# Patient Record
Sex: MALE | Race: WHITE | NOT HISPANIC OR LATINO | Employment: OTHER | URBAN - METROPOLITAN AREA
[De-identification: names, ages, dates, MRNs, and addresses within clinical notes are randomized per-mention and may not be internally consistent; named-entity substitution may affect disease eponyms.]

---

## 2023-10-11 ENCOUNTER — HOSPITAL ENCOUNTER (INPATIENT)
Facility: HOSPITAL | Age: 73
LOS: 4 days | Discharge: HOME | DRG: 640 | End: 2023-10-15
Attending: EMERGENCY MEDICINE | Admitting: STUDENT IN AN ORGANIZED HEALTH CARE EDUCATION/TRAINING PROGRAM
Payer: MEDICARE

## 2023-10-11 ENCOUNTER — APPOINTMENT (OUTPATIENT)
Dept: RADIOLOGY | Facility: HOSPITAL | Age: 73
DRG: 640 | End: 2023-10-11
Payer: MEDICARE

## 2023-10-11 DIAGNOSIS — Z59.9 FINANCIAL DIFFICULTIES: ICD-10-CM

## 2023-10-11 DIAGNOSIS — Z91.89 AT INCREASED RISK FOR SOCIAL ISOLATION: ICD-10-CM

## 2023-10-11 DIAGNOSIS — N18.6 ESRD (END STAGE RENAL DISEASE) (MULTI): ICD-10-CM

## 2023-10-11 DIAGNOSIS — E63.9 UNDERNUTRITION: ICD-10-CM

## 2023-10-11 DIAGNOSIS — F33.1 MODERATE EPISODE OF RECURRENT MAJOR DEPRESSIVE DISORDER (MULTI): ICD-10-CM

## 2023-10-11 DIAGNOSIS — R79.89 ELEVATED TROPONIN: ICD-10-CM

## 2023-10-11 DIAGNOSIS — R53.1 WEAKNESS: Primary | ICD-10-CM

## 2023-10-11 PROBLEM — Z99.2 ESRD ON DIALYSIS (MULTI): Chronic | Status: ACTIVE | Noted: 2023-10-11

## 2023-10-11 PROBLEM — D53.9 MACROCYTIC ANEMIA: Status: ACTIVE | Noted: 2023-10-11

## 2023-10-11 LAB
ALBUMIN SERPL BCP-MCNC: 4.2 G/DL (ref 3.4–5)
ALP SERPL-CCNC: 100 U/L (ref 33–136)
ALT SERPL W P-5'-P-CCNC: 21 U/L (ref 10–52)
ANION GAP SERPL CALC-SCNC: 16 MMOL/L (ref 10–20)
APPEARANCE UR: CLEAR
AST SERPL W P-5'-P-CCNC: 22 U/L (ref 9–39)
BASOPHILS # BLD AUTO: 0.02 X10*3/UL (ref 0–0.1)
BASOPHILS NFR BLD AUTO: 0.2 %
BILIRUB SERPL-MCNC: 1 MG/DL (ref 0–1.2)
BILIRUB UR STRIP.AUTO-MCNC: NEGATIVE MG/DL
BUN SERPL-MCNC: 29 MG/DL (ref 6–23)
CALCIUM SERPL-MCNC: 10.4 MG/DL (ref 8.6–10.3)
CARDIAC TROPONIN I PNL SERPL HS: 120 NG/L (ref 0–20)
CARDIAC TROPONIN I PNL SERPL HS: 123 NG/L (ref 0–20)
CHLORIDE SERPL-SCNC: 95 MMOL/L (ref 98–107)
CO2 SERPL-SCNC: 33 MMOL/L (ref 21–32)
COLOR UR: YELLOW
CREAT SERPL-MCNC: 2.8 MG/DL (ref 0.5–1.3)
EOSINOPHIL # BLD AUTO: 0.14 X10*3/UL (ref 0–0.4)
EOSINOPHIL NFR BLD AUTO: 1.6 %
ERYTHROCYTE [DISTWIDTH] IN BLOOD BY AUTOMATED COUNT: 14 % (ref 11.5–14.5)
FLUAV RNA RESP QL NAA+PROBE: NOT DETECTED
FLUBV RNA RESP QL NAA+PROBE: NOT DETECTED
GFR SERPL CREATININE-BSD FRML MDRD: 23 ML/MIN/1.73M*2
GLUCOSE SERPL-MCNC: 102 MG/DL (ref 74–99)
GLUCOSE UR STRIP.AUTO-MCNC: ABNORMAL MG/DL
HCT VFR BLD AUTO: 33.7 % (ref 41–52)
HGB BLD-MCNC: 11 G/DL (ref 13.5–17.5)
HOLD SPECIMEN: NORMAL
IMM GRANULOCYTES # BLD AUTO: 0.02 X10*3/UL (ref 0–0.5)
IMM GRANULOCYTES NFR BLD AUTO: 0.2 % (ref 0–0.9)
KETONES UR STRIP.AUTO-MCNC: NEGATIVE MG/DL
LEUKOCYTE ESTERASE UR QL STRIP.AUTO: NEGATIVE
LYMPHOCYTES # BLD AUTO: 3.95 X10*3/UL (ref 0.8–3)
LYMPHOCYTES NFR BLD AUTO: 46 %
MAGNESIUM SERPL-MCNC: 2.17 MG/DL (ref 1.6–2.4)
MCH RBC QN AUTO: 33.3 PG (ref 26–34)
MCHC RBC AUTO-ENTMCNC: 32.6 G/DL (ref 32–36)
MCV RBC AUTO: 102 FL (ref 80–100)
MONOCYTES # BLD AUTO: 0.81 X10*3/UL (ref 0.05–0.8)
MONOCYTES NFR BLD AUTO: 9.4 %
NEUTROPHILS # BLD AUTO: 3.65 X10*3/UL (ref 1.6–5.5)
NEUTROPHILS NFR BLD AUTO: 42.6 %
NITRITE UR QL STRIP.AUTO: NEGATIVE
NRBC BLD-RTO: 0 /100 WBCS (ref 0–0)
PH UR STRIP.AUTO: 9 [PH]
PLATELET # BLD AUTO: 113 X10*3/UL (ref 150–450)
PMV BLD AUTO: 10.5 FL (ref 7.5–11.5)
POTASSIUM SERPL-SCNC: 3.3 MMOL/L (ref 3.5–5.3)
PROT SERPL-MCNC: 6.6 G/DL (ref 6.4–8.2)
PROT UR STRIP.AUTO-MCNC: ABNORMAL MG/DL
RBC # BLD AUTO: 3.3 X10*6/UL (ref 4.5–5.9)
RBC # UR STRIP.AUTO: NEGATIVE /UL
RBC #/AREA URNS AUTO: NORMAL /HPF
SARS-COV-2 RNA RESP QL NAA+PROBE: NOT DETECTED
SODIUM SERPL-SCNC: 141 MMOL/L (ref 136–145)
SP GR UR STRIP.AUTO: 1
UROBILINOGEN UR STRIP.AUTO-MCNC: <2 MG/DL
WBC # BLD AUTO: 8.6 X10*3/UL (ref 4.4–11.3)
WBC #/AREA URNS AUTO: NORMAL /HPF

## 2023-10-11 PROCEDURE — 2500000001 HC RX 250 WO HCPCS SELF ADMINISTERED DRUGS (ALT 637 FOR MEDICARE OP): Performed by: EMERGENCY MEDICINE

## 2023-10-11 PROCEDURE — 71046 X-RAY EXAM CHEST 2 VIEWS: CPT | Performed by: RADIOLOGY

## 2023-10-11 PROCEDURE — 82746 ASSAY OF FOLIC ACID SERUM: CPT | Mod: CMCLAB

## 2023-10-11 PROCEDURE — 1100000001 HC PRIVATE ROOM DAILY

## 2023-10-11 PROCEDURE — 99285 EMERGENCY DEPT VISIT HI MDM: CPT | Performed by: EMERGENCY MEDICINE

## 2023-10-11 PROCEDURE — 2500000004 HC RX 250 GENERAL PHARMACY W/ HCPCS (ALT 636 FOR OP/ED)

## 2023-10-11 PROCEDURE — 71046 X-RAY EXAM CHEST 2 VIEWS: CPT

## 2023-10-11 PROCEDURE — 81001 URINALYSIS AUTO W/SCOPE: CPT

## 2023-10-11 PROCEDURE — 87636 SARSCOV2 & INF A&B AMP PRB: CPT

## 2023-10-11 PROCEDURE — 84484 ASSAY OF TROPONIN QUANT: CPT

## 2023-10-11 PROCEDURE — 2500000001 HC RX 250 WO HCPCS SELF ADMINISTERED DRUGS (ALT 637 FOR MEDICARE OP)

## 2023-10-11 PROCEDURE — 85025 COMPLETE CBC W/AUTO DIFF WBC: CPT

## 2023-10-11 PROCEDURE — 36415 COLL VENOUS BLD VENIPUNCTURE: CPT

## 2023-10-11 PROCEDURE — 84443 ASSAY THYROID STIM HORMONE: CPT

## 2023-10-11 PROCEDURE — 87340 HEPATITIS B SURFACE AG IA: CPT | Mod: CMCLAB | Performed by: INTERNAL MEDICINE

## 2023-10-11 PROCEDURE — 99285 EMERGENCY DEPT VISIT HI MDM: CPT | Mod: 25 | Performed by: EMERGENCY MEDICINE

## 2023-10-11 PROCEDURE — 99223 1ST HOSP IP/OBS HIGH 75: CPT

## 2023-10-11 PROCEDURE — 83735 ASSAY OF MAGNESIUM: CPT

## 2023-10-11 PROCEDURE — 80053 COMPREHEN METABOLIC PANEL: CPT

## 2023-10-11 RX ORDER — VENLAFAXINE 37.5 MG/1
37.5 TABLET ORAL DAILY
COMMUNITY
End: 2023-10-15 | Stop reason: HOSPADM

## 2023-10-11 RX ORDER — DIVALPROEX SODIUM 250 MG/1
250 TABLET, FILM COATED, EXTENDED RELEASE ORAL NIGHTLY
COMMUNITY

## 2023-10-11 RX ORDER — POTASSIUM CHLORIDE 1.5 G/1.58G
20 POWDER, FOR SOLUTION ORAL ONCE
Status: COMPLETED | OUTPATIENT
Start: 2023-10-11 | End: 2023-10-11

## 2023-10-11 RX ORDER — MIDODRINE HYDROCHLORIDE 10 MG/1
10 TABLET ORAL DAILY PRN
COMMUNITY

## 2023-10-11 RX ORDER — CALCIUM ACETATE 667 MG/1
667 CAPSULE ORAL
COMMUNITY

## 2023-10-11 RX ORDER — BUPROPION HYDROCHLORIDE 100 MG/1
200 TABLET, EXTENDED RELEASE ORAL 2 TIMES DAILY
Status: DISCONTINUED | OUTPATIENT
Start: 2023-10-11 | End: 2023-10-12

## 2023-10-11 RX ORDER — VENLAFAXINE HYDROCHLORIDE 75 MG/1
75 CAPSULE, EXTENDED RELEASE ORAL DAILY
COMMUNITY
End: 2023-10-15 | Stop reason: HOSPADM

## 2023-10-11 RX ORDER — SENNOSIDES 8.6 MG/1
2 TABLET ORAL 2 TIMES DAILY
Status: DISCONTINUED | OUTPATIENT
Start: 2023-10-11 | End: 2023-10-15 | Stop reason: HOSPADM

## 2023-10-11 RX ORDER — VENLAFAXINE HYDROCHLORIDE 75 MG/1
75 CAPSULE, EXTENDED RELEASE ORAL
Status: DISCONTINUED | OUTPATIENT
Start: 2023-10-12 | End: 2023-10-12

## 2023-10-11 RX ORDER — POTASSIUM CHLORIDE 20 MEQ/1
20 TABLET, EXTENDED RELEASE ORAL ONCE
Status: COMPLETED | OUTPATIENT
Start: 2023-10-11 | End: 2023-10-11

## 2023-10-11 RX ORDER — VENLAFAXINE HYDROCHLORIDE 37.5 MG/1
37.5 CAPSULE, EXTENDED RELEASE ORAL
Status: DISCONTINUED | OUTPATIENT
Start: 2023-10-12 | End: 2023-10-12

## 2023-10-11 RX ORDER — POLYETHYLENE GLYCOL 3350 17 G/17G
17 POWDER, FOR SOLUTION ORAL DAILY
Status: DISCONTINUED | OUTPATIENT
Start: 2023-10-12 | End: 2023-10-15 | Stop reason: HOSPADM

## 2023-10-11 RX ORDER — BUPROPION HYDROCHLORIDE 200 MG/1
200 TABLET, EXTENDED RELEASE ORAL DAILY
COMMUNITY

## 2023-10-11 RX ORDER — DIVALPROEX SODIUM 250 MG/1
250 TABLET, FILM COATED, EXTENDED RELEASE ORAL DAILY
Status: DISCONTINUED | OUTPATIENT
Start: 2023-10-11 | End: 2023-10-15 | Stop reason: HOSPADM

## 2023-10-11 RX ADMIN — DIVALPROEX SODIUM 250 MG: 250 TABLET, EXTENDED RELEASE ORAL at 22:22

## 2023-10-11 RX ADMIN — POTASSIUM CHLORIDE 20 MEQ: 1500 TABLET, EXTENDED RELEASE ORAL at 23:49

## 2023-10-11 RX ADMIN — BUPROPION HYDROCHLORIDE 200 MG: 100 TABLET, FILM COATED, EXTENDED RELEASE ORAL at 22:22

## 2023-10-11 RX ADMIN — SENNOSIDES 17.2 MG: 8.6 TABLET, FILM COATED ORAL at 23:49

## 2023-10-11 RX ADMIN — POTASSIUM CHLORIDE 20 MEQ: 1.5 POWDER, FOR SOLUTION ORAL at 23:49

## 2023-10-11 SDOH — SOCIAL STABILITY: SOCIAL INSECURITY: DO YOU FEEL ANYONE HAS EXPLOITED OR TAKEN ADVANTAGE OF YOU FINANCIALLY OR OF YOUR PERSONAL PROPERTY?: NO

## 2023-10-11 SDOH — SOCIAL STABILITY: SOCIAL INSECURITY: HAS ANYONE EVER THREATENED TO HURT YOUR FAMILY OR YOUR PETS?: NO

## 2023-10-11 SDOH — HEALTH STABILITY: MENTAL HEALTH: SUICIDE ASSESSMENT: ADULT (C-SSRS)

## 2023-10-11 SDOH — HEALTH STABILITY: MENTAL HEALTH

## 2023-10-11 SDOH — HEALTH STABILITY: MENTAL HEALTH: HAVE YOU EVER DONE ANYTHING, STARTED TO DO ANYTHING, OR PREPARED TO DO ANYTHING TO END YOUR LIFE?: NO

## 2023-10-11 SDOH — SOCIAL STABILITY: SOCIAL INSECURITY: WERE YOU ABLE TO COMPLETE ALL THE BEHAVIORAL HEALTH SCREENINGS?: YES

## 2023-10-11 SDOH — SOCIAL STABILITY: SOCIAL INSECURITY: HAVE YOU HAD THOUGHTS OF HARMING ANYONE ELSE?: NO

## 2023-10-11 SDOH — HEALTH STABILITY: MENTAL HEALTH: HAVE YOU WISHED YOU WERE DEAD OR WISHED YOU COULD GO TO SLEEP AND NOT WAKE UP?: NO

## 2023-10-11 SDOH — SOCIAL STABILITY: SOCIAL INSECURITY: ARE THERE ANY APPARENT SIGNS OF INJURIES/BEHAVIORS THAT COULD BE RELATED TO ABUSE/NEGLECT?: NO

## 2023-10-11 SDOH — SOCIAL STABILITY: SOCIAL INSECURITY: DO YOU FEEL UNSAFE GOING BACK TO THE PLACE WHERE YOU ARE LIVING?: NO

## 2023-10-11 SDOH — HEALTH STABILITY: MENTAL HEALTH: CONTENT: UNREMARKABLE

## 2023-10-11 SDOH — HEALTH STABILITY: MENTAL HEALTH: HAVE YOU ACTUALLY HAD ANY THOUGHTS OF KILLING YOURSELF?: NO

## 2023-10-11 SDOH — ECONOMIC STABILITY - INCOME SECURITY: PROBLEM RELATED TO HOUSING AND ECONOMIC CIRCUMSTANCES, UNSPECIFIED: Z59.9

## 2023-10-11 SDOH — HEALTH STABILITY: MENTAL HEALTH: BEHAVIORS/MOOD: TEARFUL

## 2023-10-11 SDOH — SOCIAL STABILITY: SOCIAL INSECURITY: ARE YOU OR HAVE YOU BEEN THREATENED OR ABUSED PHYSICALLY, EMOTIONALLY, OR SEXUALLY BY ANYONE?: NO

## 2023-10-11 SDOH — SOCIAL STABILITY: SOCIAL INSECURITY: ABUSE: ADULT

## 2023-10-11 SDOH — HEALTH STABILITY: MENTAL HEALTH: SLEEP PATTERN: DIFFICULTY FALLING ASLEEP

## 2023-10-11 SDOH — SOCIAL STABILITY: SOCIAL INSECURITY: DOES ANYONE TRY TO KEEP YOU FROM HAVING/CONTACTING OTHER FRIENDS OR DOING THINGS OUTSIDE YOUR HOME?: NO

## 2023-10-11 SDOH — SOCIAL STABILITY: SOCIAL NETWORK: PARENT/GUARDIAN/SIGNIFICANT OTHER INVOLVEMENT: NO INVOLVEMENT

## 2023-10-11 SDOH — SOCIAL STABILITY: SOCIAL NETWORK: EMOTIONAL SUPPORT GIVEN: REASSURE

## 2023-10-11 ASSESSMENT — COGNITIVE AND FUNCTIONAL STATUS - GENERAL
STANDING UP FROM CHAIR USING ARMS: A LITTLE
MOBILITY SCORE: 19
PERSONAL GROOMING: A LITTLE
WALKING IN HOSPITAL ROOM: A LITTLE
DRESSING REGULAR UPPER BODY CLOTHING: A LITTLE
DAILY ACTIVITIY SCORE: 19
PATIENT BASELINE BEDBOUND: NO
DRESSING REGULAR LOWER BODY CLOTHING: A LITTLE
HELP NEEDED FOR BATHING: A LITTLE
CLIMB 3 TO 5 STEPS WITH RAILING: A LITTLE
MOVING TO AND FROM BED TO CHAIR: A LITTLE
TOILETING: A LITTLE
TURNING FROM BACK TO SIDE WHILE IN FLAT BAD: A LITTLE

## 2023-10-11 ASSESSMENT — ACTIVITIES OF DAILY LIVING (ADL)
TOILETING: NEEDS ASSISTANCE
GROOMING: INDEPENDENT
PATIENT'S MEMORY ADEQUATE TO SAFELY COMPLETE DAILY ACTIVITIES?: YES
FEEDING YOURSELF: INDEPENDENT
HEARING - LEFT EAR: FUNCTIONAL
DRESSING YOURSELF: INDEPENDENT
JUDGMENT_ADEQUATE_SAFELY_COMPLETE_DAILY_ACTIVITIES: YES
HEARING - RIGHT EAR: FUNCTIONAL
BATHING: INDEPENDENT
LACK_OF_TRANSPORTATION: NO
ADEQUATE_TO_COMPLETE_ADL: YES
WALKS IN HOME: NEEDS ASSISTANCE

## 2023-10-11 ASSESSMENT — PATIENT HEALTH QUESTIONNAIRE - PHQ9
1. LITTLE INTEREST OR PLEASURE IN DOING THINGS: SEVERAL DAYS
SUM OF ALL RESPONSES TO PHQ9 QUESTIONS 1 & 2: 2
2. FEELING DOWN, DEPRESSED OR HOPELESS: SEVERAL DAYS

## 2023-10-11 ASSESSMENT — LIFESTYLE VARIABLES
EVER HAD A DRINK FIRST THING IN THE MORNING TO STEADY YOUR NERVES TO GET RID OF A HANGOVER: NO
HOW MANY STANDARD DRINKS CONTAINING ALCOHOL DO YOU HAVE ON A TYPICAL DAY: PATIENT DOES NOT DRINK
HOW OFTEN DO YOU HAVE A DRINK CONTAINING ALCOHOL: NEVER
HAVE YOU EVER FELT YOU SHOULD CUT DOWN ON YOUR DRINKING: NO
AUDIT-C TOTAL SCORE: 0
EVER FELT BAD OR GUILTY ABOUT YOUR DRINKING: NO
AUDIT-C TOTAL SCORE: 0
HAVE PEOPLE ANNOYED YOU BY CRITICIZING YOUR DRINKING: NO
SKIP TO QUESTIONS 9-10: 1
HOW OFTEN DO YOU HAVE 6 OR MORE DRINKS ON ONE OCCASION: NEVER

## 2023-10-11 ASSESSMENT — COLUMBIA-SUICIDE SEVERITY RATING SCALE - C-SSRS
6. HAVE YOU EVER DONE ANYTHING, STARTED TO DO ANYTHING, OR PREPARED TO DO ANYTHING TO END YOUR LIFE?: NO
1. IN THE PAST MONTH, HAVE YOU WISHED YOU WERE DEAD OR WISHED YOU COULD GO TO SLEEP AND NOT WAKE UP?: NO
2. HAVE YOU ACTUALLY HAD ANY THOUGHTS OF KILLING YOURSELF?: NO

## 2023-10-11 ASSESSMENT — PAIN SCALES - GENERAL
PAINLEVEL_OUTOF10: 0 - NO PAIN
PAINLEVEL_OUTOF10: 0 - NO PAIN

## 2023-10-11 ASSESSMENT — PAIN - FUNCTIONAL ASSESSMENT
PAIN_FUNCTIONAL_ASSESSMENT: 0-10
PAIN_FUNCTIONAL_ASSESSMENT: 0-10

## 2023-10-11 NOTE — ED TRIAGE NOTES
Pt to ED after his dialysis session today feeling down and depressed. Pt states he is unable to care for himself at home because he is weak. States he was in a facility for 5 months but did not care for it. Pt tearful during discussion, denies SI/HI/AH/VH.

## 2023-10-11 NOTE — ED PROVIDER NOTES
HPI   Chief Complaint   Patient presents with    Depression       HPI     73 year old male with past history of bipolar, constipation, esrd on hd  Arriving at [facility] er for weakness, undernutrition and poor mood sent by his nephrologist for possible placement for acute rehab. He was discharged from [facility] recently and prefers not to return. He states that he has been eating less than usual and cooking less although he made a turkey sandwich yesterday night but did not eat anything except a protein bar today. He denies recent weight loss. He enjoys his activities such as driving through a park but has not gone for several weeks due to fatigue. He has minimal family support has brothers in [place] and a sister in [place] but no siblings in [place], he has no children, lives by himself, He sleeps well. He has financial constraints and therefore does not use [employer] or other food delivery services. He has a psychiatrist and states that he has not seen him recently.              No data recorded                Patient History   Past Medical History:   Diagnosis Date    Bipolar disorder, unspecified (CMS/[facility])     Bipolar disorder (manic depression)    End stage renal disease (CMS/[facility]) 05/27/2015    ESRD (end stage renal disease) on dialysis     Past Surgical History:   Procedure Laterality Date    OTHER SURGICAL HISTORY  05/27/2015    Creation Of AV Fistula - Nonautogenous Graft    TONSILLECTOMY  05/27/2015    Tonsillectomy With Adenoidectomy     No family history on file.  Social History     Tobacco Use    Smoking status: Not on file    Smokeless tobacco: Not on file   Substance Use Topics    Alcohol use: Not on file    Drug use: Not on file       Physical Exam   ED Triage Vitals [10/11/23 1606]   Temp Heart Rate Resp BP   36.6 °C (97.9 °F) 87 18 136/90      SpO2 Temp src Heart Rate Source Patient Position   98 % -- -- Lying      BP Location FiO2 (%)     Left arm --       Physical Exam  General: Alert,  frail, mild acute distress, well developed, dehydrated  Head: NCAT  Eyes: Clear conjunctiva, EOMI  ENT: Moist mucosa, no lymphadenopathy  Respiratory: Normal respiratory effort. CTAB. Symmetric aeration. No wheezes, rales, or Rhonchi.  CV: Normal rhythm, Normal Rate, pulses present bilaterally  GI: Soft, NT, no guarding, BS normoactive, No distention, No hernia, No palpable mass.  : no flank tenderness, no cva tenderness  MSK: Atraumatic. Full ROM in extremities. Bilateral symmetric intact strength. No pedal edema.  Skin: Warm, c/d/i  Neuro: AOx3, facial symmetry,   sensorimotor intact in extremities,   CN intact,v Nonfocal neurological exam  Psych:  Visibly distressed, tearful, psychomotor slowing    ED Course & MDM   ED Course as of 10/11/23 2141   Wed Oct 11, 2023   2139 Troponin I, High Sensitivity(!!): 120 [SM]      ED Course User Index  [SM] Rosa Valdez MD         Diagnoses as of 10/11/23 2141   Weakness   Undernutrition   ESRD (end stage renal disease) (CMS/HCC)   Moderate episode of recurrent major depressive disorder (CMS/[facility])   At increased risk for social isolation   Financial difficulties   Elevated troponin       Medical Decision Making    Workup was done for possible causes of weakness including electrolyte imbalance, cardiac ischemia / ACS, Covid, Influenza and social work was contacted for resources for him to access meals consistently.  The patient said he would not be able to call 211 for this per Social work. Elevated Tn with no CP noted higher than his baseline in context of ESRD, so repeat TN were ordered. Placed call to to cardio for this. His cardiologist is Dr. Sapp who is aware of the Tn and advised supportive care.     Reached out to his nephrologist who stated he had to be wheeled out for weakness at HD, and would benefit from admission for his mood and placement at acute rehab. Keep in view that he has significant financial difficulties affecting his access to food. He may be  undermedicated due to HD in regards to his psychiatric meds and may benefit from seeing psych during the process of, or after, placement. Discussed with QB and admitting team.     External Records Reviewed: I reviewed recent and relevant outside records including: none  Independent Interpretation of Studies: I independently interpreted: As above  Social Determinants Affecting Care: documented above  Diagnostic testing considered: As above    I reviewed the case with the attending ER physician. Patient and/or patient´s representative was counseled regarding labs, imaging, likely diagnosis, and plan.     Rosa Valdez MD MS  PGY-1, Emergency Medicine    The above documentation was completed with the use of speech recognition software. It may contain dictation errors secondary to limitations of the software.        Rosa Valdez MD  Resident  10/11/23 5267    The patient was seen by the resident/fellow.  I have personally performed a substantive portion of the encounter.  I have seen and examined the patient; agree with the workup, evaluation, MDM, management and diagnosis.  The care plan has been discussed with the resident/fellow; I have reviewed the resident/fellow’s note and agree with the documented findings with the exception/addition of the following:    The patient does not believe he can care for himself any longer, and is extremely and he is very depressed by this.  He is not suicidal homicidal, but states that it takes all of the strength just to get a meal and is unable to call visiting [place], or Meals on Wheels, or set anything up for himself.  PT OT evaluation was called for the emergency room but could not be done, therefore the patient will be admitted for further PT OT evaluation, and possible social work consultation and placement and management.     Scott Marie,   10/21/23 1610

## 2023-10-12 ENCOUNTER — APPOINTMENT (OUTPATIENT)
Dept: RADIOLOGY | Facility: HOSPITAL | Age: 73
DRG: 640 | End: 2023-10-12
Payer: MEDICARE

## 2023-10-12 LAB
CARDIAC TROPONIN I PNL SERPL HS: 105 NG/L (ref 0–20)
FOLATE SERPL-MCNC: >24 NG/ML
TSH SERPL-ACNC: 2.63 MIU/L (ref 0.44–3.98)

## 2023-10-12 PROCEDURE — 96372 THER/PROPH/DIAG INJ SC/IM: CPT | Performed by: STUDENT IN AN ORGANIZED HEALTH CARE EDUCATION/TRAINING PROGRAM

## 2023-10-12 PROCEDURE — 74177 CT ABD & PELVIS W/CONTRAST: CPT

## 2023-10-12 PROCEDURE — 2500000001 HC RX 250 WO HCPCS SELF ADMINISTERED DRUGS (ALT 637 FOR MEDICARE OP): Performed by: EMERGENCY MEDICINE

## 2023-10-12 PROCEDURE — 2500000004 HC RX 250 GENERAL PHARMACY W/ HCPCS (ALT 636 FOR OP/ED)

## 2023-10-12 PROCEDURE — 71260 CT THORAX DX C+: CPT

## 2023-10-12 PROCEDURE — 2500000001 HC RX 250 WO HCPCS SELF ADMINISTERED DRUGS (ALT 637 FOR MEDICARE OP)

## 2023-10-12 PROCEDURE — 97165 OT EVAL LOW COMPLEX 30 MIN: CPT | Mod: GO

## 2023-10-12 PROCEDURE — 2500000004 HC RX 250 GENERAL PHARMACY W/ HCPCS (ALT 636 FOR OP/ED): Performed by: STUDENT IN AN ORGANIZED HEALTH CARE EDUCATION/TRAINING PROGRAM

## 2023-10-12 PROCEDURE — 97161 PT EVAL LOW COMPLEX 20 MIN: CPT | Mod: GP

## 2023-10-12 PROCEDURE — 2550000001 HC RX 255 CONTRASTS: Performed by: STUDENT IN AN ORGANIZED HEALTH CARE EDUCATION/TRAINING PROGRAM

## 2023-10-12 PROCEDURE — 2500000001 HC RX 250 WO HCPCS SELF ADMINISTERED DRUGS (ALT 637 FOR MEDICARE OP): Performed by: PSYCHIATRY & NEUROLOGY

## 2023-10-12 PROCEDURE — 99233 SBSQ HOSP IP/OBS HIGH 50: CPT | Performed by: STUDENT IN AN ORGANIZED HEALTH CARE EDUCATION/TRAINING PROGRAM

## 2023-10-12 PROCEDURE — 1100000001 HC PRIVATE ROOM DAILY

## 2023-10-12 PROCEDURE — 99222 1ST HOSP IP/OBS MODERATE 55: CPT | Performed by: STUDENT IN AN ORGANIZED HEALTH CARE EDUCATION/TRAINING PROGRAM

## 2023-10-12 PROCEDURE — G1004 CDSM NDSC: HCPCS

## 2023-10-12 PROCEDURE — 97535 SELF CARE MNGMENT TRAINING: CPT | Mod: GO

## 2023-10-12 PROCEDURE — 74170 CT ABD WO CNTRST FLWD CNTRST: CPT | Performed by: RADIOLOGY

## 2023-10-12 PROCEDURE — 99222 1ST HOSP IP/OBS MODERATE 55: CPT | Performed by: UROLOGY

## 2023-10-12 PROCEDURE — 2500000001 HC RX 250 WO HCPCS SELF ADMINISTERED DRUGS (ALT 637 FOR MEDICARE OP): Performed by: STUDENT IN AN ORGANIZED HEALTH CARE EDUCATION/TRAINING PROGRAM

## 2023-10-12 PROCEDURE — 2500000001 HC RX 250 WO HCPCS SELF ADMINISTERED DRUGS (ALT 637 FOR MEDICARE OP): Performed by: INTERNAL MEDICINE

## 2023-10-12 PROCEDURE — 97530 THERAPEUTIC ACTIVITIES: CPT | Mod: GP

## 2023-10-12 PROCEDURE — 90792 PSYCH DIAG EVAL W/MED SRVCS: CPT | Performed by: PSYCHIATRY & NEUROLOGY

## 2023-10-12 PROCEDURE — 2500000004 HC RX 250 GENERAL PHARMACY W/ HCPCS (ALT 636 FOR OP/ED): Performed by: EMERGENCY MEDICINE

## 2023-10-12 RX ORDER — BUPROPION HYDROCHLORIDE 100 MG/1
200 TABLET, EXTENDED RELEASE ORAL DAILY
Status: DISCONTINUED | OUTPATIENT
Start: 2023-10-13 | End: 2023-10-15 | Stop reason: HOSPADM

## 2023-10-12 RX ORDER — MIDODRINE HYDROCHLORIDE 10 MG/1
10 TABLET ORAL DAILY PRN
Status: DISCONTINUED | OUTPATIENT
Start: 2023-10-12 | End: 2023-10-15 | Stop reason: HOSPADM

## 2023-10-12 RX ORDER — CALCIUM ACETATE 667 MG/1
667 CAPSULE ORAL
Status: DISCONTINUED | OUTPATIENT
Start: 2023-10-12 | End: 2023-10-15 | Stop reason: HOSPADM

## 2023-10-12 RX ORDER — ACETAMINOPHEN 500 MG
5 TABLET ORAL NIGHTLY
Status: DISCONTINUED | OUTPATIENT
Start: 2023-10-12 | End: 2023-10-13

## 2023-10-12 RX ORDER — HEPARIN SODIUM 5000 [USP'U]/ML
5000 INJECTION, SOLUTION INTRAVENOUS; SUBCUTANEOUS EVERY 8 HOURS
Status: DISCONTINUED | OUTPATIENT
Start: 2023-10-12 | End: 2023-10-15 | Stop reason: HOSPADM

## 2023-10-12 RX ORDER — VENLAFAXINE HYDROCHLORIDE 75 MG/1
150 CAPSULE, EXTENDED RELEASE ORAL
Status: DISCONTINUED | OUTPATIENT
Start: 2023-10-13 | End: 2023-10-15 | Stop reason: HOSPADM

## 2023-10-12 RX ADMIN — HEPARIN SODIUM 5000 UNITS: 5000 INJECTION INTRAVENOUS; SUBCUTANEOUS at 11:04

## 2023-10-12 RX ADMIN — SENNOSIDES 17.2 MG: 8.6 TABLET, FILM COATED ORAL at 09:00

## 2023-10-12 RX ADMIN — POLYETHYLENE GLYCOL 3350 17 G: 17 POWDER, FOR SOLUTION ORAL at 09:00

## 2023-10-12 RX ADMIN — CALCIUM ACETATE 667 MG: 667 CAPSULE ORAL at 12:50

## 2023-10-12 RX ADMIN — VENLAFAXINE HYDROCHLORIDE 37.5 MG: 37.5 CAPSULE, EXTENDED RELEASE ORAL at 08:52

## 2023-10-12 RX ADMIN — IOHEXOL 75 ML: 350 INJECTION, SOLUTION INTRAVENOUS at 10:48

## 2023-10-12 RX ADMIN — HEPARIN SODIUM 5000 UNITS: 5000 INJECTION INTRAVENOUS; SUBCUTANEOUS at 17:54

## 2023-10-12 RX ADMIN — SENNOSIDES 17.2 MG: 8.6 TABLET, FILM COATED ORAL at 20:39

## 2023-10-12 RX ADMIN — DIVALPROEX SODIUM 250 MG: 250 TABLET, EXTENDED RELEASE ORAL at 21:00

## 2023-10-12 RX ADMIN — VENLAFAXINE HYDROCHLORIDE 75 MG: 75 CAPSULE, EXTENDED RELEASE ORAL at 08:50

## 2023-10-12 RX ADMIN — Medication 1 CAPSULE: at 14:42

## 2023-10-12 RX ADMIN — CALCIUM ACETATE 667 MG: 667 CAPSULE ORAL at 17:54

## 2023-10-12 RX ADMIN — Medication 5 MG: at 20:39

## 2023-10-12 RX ADMIN — BUPROPION HYDROCHLORIDE 200 MG: 100 TABLET, FILM COATED, EXTENDED RELEASE ORAL at 08:50

## 2023-10-12 SDOH — SOCIAL STABILITY: SOCIAL NETWORK: HOW OFTEN DO YOU GET TOGETHER WITH FRIENDS OR RELATIVES?: NEVER

## 2023-10-12 SDOH — HEALTH STABILITY: MENTAL HEALTH: HOW OFTEN DO YOU HAVE A DRINK CONTAINING ALCOHOL?: NEVER

## 2023-10-12 SDOH — ECONOMIC STABILITY: HOUSING INSECURITY
IN THE LAST 12 MONTHS, WAS THERE A TIME WHEN YOU DID NOT HAVE A STEADY PLACE TO SLEEP OR SLEPT IN A SHELTER (INCLUDING NOW)?: NO

## 2023-10-12 SDOH — SOCIAL STABILITY: SOCIAL INSECURITY: WITHIN THE LAST YEAR, HAVE YOU BEEN AFRAID OF YOUR PARTNER OR EX-PARTNER?: NO

## 2023-10-12 SDOH — HEALTH STABILITY: MENTAL HEALTH: HOW MANY STANDARD DRINKS CONTAINING ALCOHOL DO YOU HAVE ON A TYPICAL DAY?: PATIENT DOES NOT DRINK

## 2023-10-12 SDOH — SOCIAL STABILITY: SOCIAL NETWORK
DO YOU BELONG TO ANY CLUBS OR ORGANIZATIONS SUCH AS CHURCH GROUPS UNIONS, FRATERNAL OR ATHLETIC GROUPS, OR SCHOOL GROUPS?: NO

## 2023-10-12 SDOH — ECONOMIC STABILITY: FOOD INSECURITY: WITHIN THE PAST 12 MONTHS, YOU WORRIED THAT YOUR FOOD WOULD RUN OUT BEFORE YOU GOT MONEY TO BUY MORE.: NEVER TRUE

## 2023-10-12 SDOH — ECONOMIC STABILITY: HOUSING INSECURITY: IN THE LAST 12 MONTHS, HOW MANY PLACES HAVE YOU LIVED?: 2

## 2023-10-12 SDOH — SOCIAL STABILITY: SOCIAL INSECURITY: WITHIN THE LAST YEAR, HAVE YOU BEEN HUMILIATED OR EMOTIONALLY ABUSED IN OTHER WAYS BY YOUR PARTNER OR EX-PARTNER?: NO

## 2023-10-12 SDOH — ECONOMIC STABILITY: INCOME INSECURITY: IN THE PAST 12 MONTHS, HAS THE ELECTRIC, GAS, OIL, OR WATER COMPANY THREATENED TO SHUT OFF SERVICE IN YOUR HOME?: NO

## 2023-10-12 SDOH — SOCIAL STABILITY: SOCIAL INSECURITY
WITHIN THE LAST YEAR, HAVE YOU BEEN KICKED, HIT, SLAPPED, OR OTHERWISE PHYSICALLY HURT BY YOUR PARTNER OR EX-PARTNER?: NO

## 2023-10-12 SDOH — ECONOMIC STABILITY: TRANSPORTATION INSECURITY
IN THE PAST 12 MONTHS, HAS LACK OF TRANSPORTATION KEPT YOU FROM MEETINGS, WORK, OR FROM GETTING THINGS NEEDED FOR DAILY LIVING?: NO

## 2023-10-12 SDOH — SOCIAL STABILITY: SOCIAL NETWORK: IN A TYPICAL WEEK, HOW MANY TIMES DO YOU TALK ON THE PHONE WITH FAMILY, FRIENDS, OR NEIGHBORS?: ONCE A WEEK

## 2023-10-12 SDOH — HEALTH STABILITY: PHYSICAL HEALTH: ON AVERAGE, HOW MANY MINUTES DO YOU ENGAGE IN EXERCISE AT THIS LEVEL?: 0 MIN

## 2023-10-12 SDOH — HEALTH STABILITY: PHYSICAL HEALTH: ON AVERAGE, HOW MANY DAYS PER WEEK DO YOU ENGAGE IN MODERATE TO STRENUOUS EXERCISE (LIKE A BRISK WALK)?: 0 DAYS

## 2023-10-12 SDOH — SOCIAL STABILITY: SOCIAL INSECURITY
WITHIN THE LAST YEAR, HAVE TO BEEN RAPED OR FORCED TO HAVE ANY KIND OF SEXUAL ACTIVITY BY YOUR PARTNER OR EX-PARTNER?: NO

## 2023-10-12 SDOH — ECONOMIC STABILITY: FOOD INSECURITY: WITHIN THE PAST 12 MONTHS, THE FOOD YOU BOUGHT JUST DIDN'T LAST AND YOU DIDN'T HAVE MONEY TO GET MORE.: NEVER TRUE

## 2023-10-12 SDOH — SOCIAL STABILITY: SOCIAL NETWORK: HOW OFTEN DO YOU ATTENT MEETINGS OF THE CLUB OR ORGANIZATION YOU BELONG TO?: NEVER

## 2023-10-12 SDOH — HEALTH STABILITY: MENTAL HEALTH
STRESS IS WHEN SOMEONE FEELS TENSE, NERVOUS, ANXIOUS, OR CAN'T SLEEP AT NIGHT BECAUSE THEIR MIND IS TROUBLED. HOW STRESSED ARE YOU?: RATHER MUCH

## 2023-10-12 SDOH — SOCIAL STABILITY: SOCIAL NETWORK: ARE YOU MARRIED, WIDOWED, DIVORCED, SEPARATED, NEVER MARRIED, OR LIVING WITH A PARTNER?: NEVER MARRIED

## 2023-10-12 SDOH — HEALTH STABILITY: MENTAL HEALTH: HOW OFTEN DO YOU HAVE 6 OR MORE DRINKS ON ONE OCCASION?: NEVER

## 2023-10-12 SDOH — ECONOMIC STABILITY: INCOME INSECURITY: IN THE LAST 12 MONTHS, WAS THERE A TIME WHEN YOU WERE NOT ABLE TO PAY THE MORTGAGE OR RENT ON TIME?: NO

## 2023-10-12 SDOH — ECONOMIC STABILITY: INCOME INSECURITY: HOW HARD IS IT FOR YOU TO PAY FOR THE VERY BASICS LIKE FOOD, HOUSING, MEDICAL CARE, AND HEATING?: NOT HARD AT ALL

## 2023-10-12 SDOH — ECONOMIC STABILITY: TRANSPORTATION INSECURITY
IN THE PAST 12 MONTHS, HAS THE LACK OF TRANSPORTATION KEPT YOU FROM MEDICAL APPOINTMENTS OR FROM GETTING MEDICATIONS?: NO

## 2023-10-12 SDOH — SOCIAL STABILITY: SOCIAL NETWORK: HOW OFTEN DO YOU ATTEND CHURCH OR RELIGIOUS SERVICES?: NEVER

## 2023-10-12 ASSESSMENT — PAIN SCALES - GENERAL
PAINLEVEL_OUTOF10: 0 - NO PAIN

## 2023-10-12 ASSESSMENT — COGNITIVE AND FUNCTIONAL STATUS - GENERAL
MOBILITY SCORE: 19
HELP NEEDED FOR BATHING: A LITTLE
TOILETING: A LITTLE
DRESSING REGULAR LOWER BODY CLOTHING: A LITTLE
DRESSING REGULAR LOWER BODY CLOTHING: A LITTLE
STANDING UP FROM CHAIR USING ARMS: A LITTLE
PERSONAL GROOMING: A LITTLE
CLIMB 3 TO 5 STEPS WITH RAILING: A LITTLE
STANDING UP FROM CHAIR USING ARMS: A LITTLE
DAILY ACTIVITIY SCORE: 19
TURNING FROM BACK TO SIDE WHILE IN FLAT BAD: A LITTLE
MOBILITY SCORE: 21
CLIMB 3 TO 5 STEPS WITH RAILING: A LITTLE
MOVING TO AND FROM BED TO CHAIR: A LITTLE
DRESSING REGULAR UPPER BODY CLOTHING: A LITTLE
DAILY ACTIVITIY SCORE: 22
WALKING IN HOSPITAL ROOM: A LITTLE
HELP NEEDED FOR BATHING: A LITTLE
WALKING IN HOSPITAL ROOM: A LITTLE

## 2023-10-12 ASSESSMENT — ACTIVITIES OF DAILY LIVING (ADL)
BATHING_ASSISTANCE: STAND BY
LACK_OF_TRANSPORTATION: NO
HOME_MANAGEMENT_TIME_ENTRY: 10

## 2023-10-12 ASSESSMENT — LIFESTYLE VARIABLES
SKIP TO QUESTIONS 9-10: 1
AUDIT-C TOTAL SCORE: 0

## 2023-10-12 ASSESSMENT — PAIN - FUNCTIONAL ASSESSMENT
PAIN_FUNCTIONAL_ASSESSMENT: 0-10
PAIN_FUNCTIONAL_ASSESSMENT: 0-10

## 2023-10-12 NOTE — PROGRESS NOTES
"Received referral Meals on Wheels, HHA and financial constraints.  LSW had lengthy meeting to address his concerns.  This worker discussed and offered Meals On Wheels and pt states he has already had them and did not like them therefore refused at this time.  LSW discussed HHA services and pt states he would like someone to clean his house and make his meals not assist him with any adl's.  LSW discussed the Passport Program and patient declined stating he had a friend that had it and he was not interested.  LSW discussed Medicaid program, qualifications and application process.  Pt stated he tried to apply in the past but \"it is just too much, getting all those papers together and it just stressed me and my brother out\".  LSW offered to assist and pt became very upset stating he did not want to discuss it any longer.  LSW made the suggestion that the pt obtain the assistance of the SW at [facility] for Medicaid application as he is there three days per week.  Patient declined at this time.  LSW also discussed providing the pt a private duty HHA list and pt declined.  Pt informed that he resides in [place] and they have a  on staff at the [facility] that can also help him work through some of these concerns when he is at home in the community and less overwhelmed.  Pt agreeable to this plan and did not wish to continue this conversation anymore.     Lissy Martinez, LCSW      "

## 2023-10-12 NOTE — CONSULTS
ENDOVASCULAR CONSULT NOTE    History Of Present Illness:    John W Nyhan is a 73 y.o. male with history of end-stage renal disease on hemodialysis, hypertension, hyperlipidemia and coronary artery disease who is admitted with generalized weakness.  A CTA chest was obtained admission that showed findings consistent with right innominate vein occlusion.  Patient denies arm or facial swelling.  He does have chronic varicose veins in his chest.  On review of prior CTA there is occlusion of the right innominate vein stent was present on 2022.  Patient has no new symptoms.     Last Recorded Vitals:  Vitals:    10/12/23 0349 10/12/23 0700 10/12/23 1200 10/12/23 1600   BP: 133/77 143/81 132/76 134/77   BP Location: Right arm Right arm Right arm Right arm   Patient Position: Lying Lying Lying Sitting   Pulse: 99 84 93 88   Resp: 16 17 18 18   Temp: 36.4 °C (97.5 °F) 36.4 °C (97.5 °F) 36.7 °C (98.1 °F) 36.5 °C (97.7 °F)   TempSrc: Temporal Temporal Temporal Temporal   SpO2: 99% 99% 100% 98%   Weight:       Height:           Last Labs:  CBC - 10/11/2023:  5:50 PM  8.6 11.0 113    33.7      CMP - 10/11/2023:  5:50 PM  10.4 6.6 22 --- 1.0   3.7 4.2 21 100      PTT - 2/10/2023:  7:03 AM  1.0   11.1 28     Troponin I, High Sensitivity   Date/Time Value Ref Range Status   10/11/2023 11:11  (HH) 0 - 20 ng/L Final     Comment:     Previous result verified on 10/11/2023 1824 on specimen/case [id number] called with component TRPHS for procedure Troponin I, High Sensitivity, Initial with value 123 ng/L.   10/11/2023 08:57  (HH) 0 - 20 ng/L Final     Comment:     Previous result verified on 10/11/2023 1824 on specimen/case [id number] called with component TRPHS for procedure Troponin I, High Sensitivity, Initial with value 123 ng/L.   10/11/2023 05:50  (HH) 0 - 20 ng/L Final     Hemoglobin A1C   Date/Time Value Ref Range Status   11/11/2022 05:36 AM 4.3 % Final     Comment:          Diagnosis of  Diabetes-Adults   Non-Diabetic: < or = 5.6%   Increased risk for developing diabetes: 5.7-6.4%   Diagnostic of diabetes: > or = 6.5%  .       Monitoring of Diabetes                Age (y)     Therapeutic Goal (%)   Adults:          >18           <7.0   Pediatrics:    13-18           <7.5                   7-12           <8.0                   0- 6            7.5-8.5   American Diabetes Association. Diabetes Care 33(S1), Jan 2010.       VLDL   Date/Time Value Ref Range Status   11/11/2022 05:36 AM 8 0 - 40 mg/dL Final   10/27/2021 07:33 AM 13 0 - 40 mg/dL Final   05/05/2019 07:35 AM 13 0 - 40 mg/dL Final      Last I/O:  No intake/output data recorded.    Past Cardiology Tests (Last 3 Years):  CTA chest 10/12/23  IMPRESSION:  Right renal mass measuring up to 3.4 cm is suspicious for renal  carcinoma. No metastatic disease is noted.      Right innominate vein stent is occluded with collateral vessels  through the chest and abdominal wall, greater on the right than the  left with drainage through the azygous system.    CTA 11/10/22  IMPRESSION:  1. No pulmonary embolism.  2. Intravascular stent proximal right subclavian artery extending  into the superior vena cava. There is partial thrombosis of the  distal superior vena cava and exuberant venous collaterals along the  right chest wall.      Past Medical History:  He has a past medical history of Bipolar disorder, unspecified (CMS/[facility]) and End stage renal disease (CMS/[facility]) (05/27/2015).    Past Surgical History:  He has a past surgical history that includes Other surgical history (05/27/2015) and Tonsillectomy (05/27/2015).      Social History:  He reports that he has never smoked. He has never used smokeless tobacco. No history on file for alcohol use and drug use.    Family History:  No family history on file.     Allergies:  Patient has no known allergies.    Inpatient Medications:  Scheduled medications   Medication Dose Route Frequency    B complex-vitamin C-folic  acid  1 capsule oral Daily    [START ON 10/13/2023] buPROPion SR  200 mg oral Daily    calcium acetate  667 mg oral TID with meals    divalproex  250 mg oral Daily    heparin (porcine)  5,000 Units subcutaneous q8h    melatonin  5 mg oral Nightly    polyethylene glycol  17 g oral Daily    sennosides  2 tablet oral BID    [START ON 10/13/2023] venlafaxine XR  150 mg oral Daily with breakfast     PRN medications   Medication    midodrine     Continuous Medications   Medication Dose Last Rate     Outpatient Medications:  Current Outpatient Medications   Medication Instructions    B complex-vitamin C-folic acid (Nephro-Kobi) 0.8 mg tablet 0.8 mg, oral, Daily    buPROPion SR (WELLBUTRIN SR) 200 mg, oral, Daily, Do not crush, chew, or split.    calcium acetate (PHOSLO) 667 mg, oral, 3 times daily with meals    divalproex (DEPAKOTE ER) 250 mg, oral, Nightly, Do not crush, chew, or split.    midodrine (PROAMATINE) 10 mg, oral, Daily PRN    venlafaxine (EFFEXOR) 37.5 mg, oral, Daily, With 75mg capsule    venlafaxine XR (EFFEXOR-XR) 75 mg, oral, Daily, With 37.5mg tablet       Physical Exam:  General: NAD, well-appearing  HEENT: moist mucous membranes, no jaundice  Neck: No JVD, no carotid bruit  Lungs: CTA james, no wheezing or rales  Cardiac: RRR, no murmurs  Abdomen: soft, non-tender, non-distended  Extremities: 2+ radial pulses, no edema, no wounds  Skin: warm, dry  Neurologic: AAOx3,  no focal deficits       Assessment/Plan   Right innominate vein stent occlusion  -On review of prior images this is a chronic finding, not acute  -Patient has evidence of extensive collateral drainage via azygous vein  -Clinically patient has no arm or head swelling  -In the absence of symptoms no indication for intervention.  Continue clinical monitoring for now      Sisi Mendez MD

## 2023-10-12 NOTE — PROGRESS NOTES
John W Nyhan is a 73 y.o. male on day 1 of admission presenting with Weakness.      Subjective   Patient seen and examined at bedside this morning, he reports having not slept well and continues to feel weak. He is tearful discussing his dialysis, treatment and previous stays at rehab facilities. He does not want to return to rehab, but acknowledges he is too weak to care for himself on his own. He states his diet consists mostly of protein bars and turkey sandwiches. He does endorse a lack of enjoyment doing activities he previously enjoyed.        Objective     Last Recorded Vitals  /81 (BP Location: Right arm, Patient Position: Lying)   Pulse 84   Temp 36.4 °C (97.5 °F) (Temporal)   Resp 17   Wt 58.1 kg (128 lb)   SpO2 99%   Intake/Output last 3 Shifts:  No intake or output data in the 24 hours ending 10/12/23 0924    Admission Weight  Weight: 58.1 kg (128 lb) (10/11/23 1606)    Daily Weight  10/11/23 : 58.1 kg (128 lb)    Image Results  XR chest 2 views  Narrative: Interpreted By:  Shlomo Marinelli,   STUDY:  XR CHEST 2 VIEWS;  10/11/2023 6:43 pm      INDICATION:  Signs/Symptoms:weakness.      COMPARISON:  02/16/2023      ACCESSION NUMBER(S):  [id number]      ORDERING CLINICIAN:  JOSE ART      FINDINGS:  Heart size is enlarged. Vascular stent is similar. Tortuous thoracic  aorta. No pneumonia or pulmonary edema. No pneumothorax. Probable  changes of old healed granulomas disease      Impression: 1.  No evidence of acute cardiopulmonary process.  Stable exam              MACRO:  None      Signed by: Shlomo Marinelli 10/11/2023 7:23 PM  Dictation workstation:   [id number]    Results for orders placed or performed during the hospital encounter of 10/11/23 (from the past 24 hour(s))   Sars-CoV-2 PCR, Symptomatic   Result Value Ref Range    Coronavirus 2019, PCR Not Detected Not Detected   Influenza A, and B PCR   Result Value Ref Range    Flu A Result Not Detected Not Detected    Flu B Result Not  Detected Not Detected   CBC and Auto Differential   Result Value Ref Range    WBC 8.6 4.4 - 11.3 x10*3/uL    nRBC 0.0 0.0 - 0.0 /100 WBCs    RBC 3.30 (L) 4.50 - 5.90 x10*6/uL    Hemoglobin 11.0 (L) 13.5 - 17.5 g/dL    Hematocrit 33.7 (L) 41.0 - 52.0 %     (H) 80 - 100 fL    MCH 33.3 26.0 - 34.0 pg    MCHC 32.6 32.0 - 36.0 g/dL    RDW 14.0 11.5 - 14.5 %    Platelets 113 (L) 150 - 450 x10*3/uL    MPV 10.5 7.5 - 11.5 fL    Neutrophils % 42.6 40.0 - 80.0 %    Immature Granulocytes %, Automated 0.2 0.0 - 0.9 %    Lymphocytes % 46.0 13.0 - 44.0 %    Monocytes % 9.4 2.0 - 10.0 %    Eosinophils % 1.6 0.0 - 6.0 %    Basophils % 0.2 0.0 - 2.0 %    Neutrophils Absolute 3.65 1.60 - 5.50 x10*3/uL    Immature Granulocytes Absolute, Automated 0.02 0.00 - 0.50 x10*3/uL    Lymphocytes Absolute 3.95 (H) 0.80 - 3.00 x10*3/uL    Monocytes Absolute 0.81 (H) 0.05 - 0.80 x10*3/uL    Eosinophils Absolute 0.14 0.00 - 0.40 x10*3/uL    Basophils Absolute 0.02 0.00 - 0.10 x10*3/uL   Comprehensive Metabolic Panel   Result Value Ref Range    Glucose 102 (H) 74 - 99 mg/dL    Sodium 141 136 - 145 mmol/L    Potassium 3.3 (L) 3.5 - 5.3 mmol/L    Chloride 95 (L) 98 - 107 mmol/L    Bicarbonate 33 (H) 21 - 32 mmol/L    Anion Gap 16 10 - 20 mmol/L    Urea Nitrogen 29 (H) 6 - 23 mg/dL    Creatinine 2.80 (H) 0.50 - 1.30 mg/dL    eGFR 23 (L) >60 mL/min/1.73m*2    Calcium 10.4 (H) 8.6 - 10.3 mg/dL    Albumin 4.2 3.4 - 5.0 g/dL    Alkaline Phosphatase 100 33 - 136 U/L    Total Protein 6.6 6.4 - 8.2 g/dL    AST 22 9 - 39 U/L    Bilirubin, Total 1.0 0.0 - 1.2 mg/dL    ALT 21 10 - 52 U/L   Magnesium   Result Value Ref Range    Magnesium 2.17 1.60 - 2.40 mg/dL   Troponin I, High Sensitivity, Initial   Result Value Ref Range    Troponin I, High Sensitivity 123 (HH) 0 - 20 ng/L   Urinalysis with Reflex Microscopic   Result Value Ref Range    Color, Urine Yellow Straw, Yellow    Appearance, Urine Clear Clear    Specific Gravity, Urine 1.004 (N) 1.005 - 1.035     pH, Urine 9.0 (N) 5.0, 5.5, 6.0, 6.5, 7.0, 7.5, 8.0    Protein, Urine 100 (2+) (N) NEGATIVE mg/dL    Glucose, Urine 50 (1+) (A) NEGATIVE mg/dL    Blood, Urine NEGATIVE NEGATIVE    Ketones, Urine NEGATIVE NEGATIVE mg/dL    Bilirubin, Urine NEGATIVE NEGATIVE    Urobilinogen, Urine <2.0 <2.0 mg/dL    Nitrite, Urine NEGATIVE NEGATIVE    Leukocyte Esterase, Urine NEGATIVE NEGATIVE   Urine Gray Tube   Result Value Ref Range    Extra Tube Hold for add-ons.    Urinalysis Microscopic Only   Result Value Ref Range    WBC, Urine NONE 1-5, NONE /HPF    RBC, Urine NONE NONE, 1-2, 3-5 /HPF   Troponin I, High Sensitivity, Initial   Result Value Ref Range    Troponin I, High Sensitivity 120 (HH) 0 - 20 ng/L   Troponin, High Sensitivity, 1 Hour   Result Value Ref Range    Troponin I, High Sensitivity 105 (HH) 0 - 20 ng/L   TSH with reflex to Free T4 if abnormal   Result Value Ref Range    Thyroid Stimulating Hormone 2.63 0.44 - 3.98 mIU/L     CT chest abdomen pelvis w IV contrast    Result Date: 10/12/2023  Interpreted By:  Mony Garcia, STUDY: CT CHEST ABDOMEN PELVIS W IV CONTRAST; ;  10/12/2023 10:47 am   INDICATION: Signs/Symptoms:Unintentional weight loss, weakness.   COMPARISON: None.   ACCESSION NUMBER(S): [id number]   ORDERING CLINICIAN: HUSSEIN ROGERS   TECHNIQUE: Imaging was performed from thoracic apex through ischial tuberosities with axial, coronal and sagittal images reconstructed. Patient received 75 mL Omnipaque 350 intravenously.   FINDINGS: Trachea and mainstem bronchi are patent with no endoluminal mass. No pneumonia or pleural effusion is noted. There is no suspicious pulmonary nodule or mass.   Thyroid gland is nonenlarged. Densely calcified right paratracheal and right hilar lymph nodes are noted. There are no enlarged noncalcified mediastinal or hilar lymph nodes.   A right innominate vein stent is present which has no luminal opacification indicating occlusion. The right internal jugular vein is  occluded just proximal to the stent. Left innominate vein is patent and opacifies the superior vena cava at the terminus of the right-sided stent at the junction with the vena cava. Vena cava is opacify into the right atrium.   There are diffuse collateral opacify vessels throughout the chest wall, larger on the right than on the left. These collateral vessels extend into the abdomen. Collateral venous drainage extends through paravertebral veins into the azygous system and the azygous vein is dilated from collateral drainage into the vena cava.   Descending aorta is tortuous but nondilated. Ascending aorta has fusiform dilatation measuring up to 4.1 cm. There is no significant atherosclerotic calcification.   Diffuse coronary calcifications are present. The heart is nonenlarged but the left ventricle shows concentric hypertrophy. No pericardial effusion is present.   No mass is noted in the liver. There is no intra or extrahepatic biliary dilatation. Gallbladder is normal in appearance.   No mass or inflammation is noted involving the pancreas.   Spleen is borderline to mildly enlarged measuring 12.7 x 13.1 x 4.9 cm. A few subcapsular hypodensities are noted in the spleen measuring 1 cm or less, nonspecific. A few scattered splenic calcifications are present..   Adrenal glands appear normal.   Kidneys are atrophic bilaterally with multiple cysts and multiple subcentimeter hypodensities. At mid right kidney laterally there is a lobulated heterogeneously enhancing mass which measures 3.4 x 3.2 x 3.2 cm. There is no dilatation of the right renal vein identified. No hydronephrosis is noted in either kidney.   Bowel loops are nondilated. Appendix is not identified. No ascites, pneumoperitoneum or mesenteric inflammation is identified.   Aorta and iliac arteries are diffusely atherosclerotic with no focal aneurysm. Vena cava is normal in size with no gross evidence of thrombus. There are no pathologically enlarged  retroperitoneal lymph nodes.   Urinary bladder is physiologically full with diffuse bladder wall mild thickening consistent with hypertrophy.   Prostate gland measures 3.9 x 3.7 x 5.0 cm.   Small right indirect inguinal hernia contains fat and a portion of small bowel loop. At left inguinal ring there is bulging of mesenteric fat and small bowel, borderline hernia.   Bilateral L5 spondylolysis is associated with grade 1 spondylolisthesis. The L5-S1 disc is severely narrowed and there is moderate to severe adjacent endplate degenerative change. Scoliosis is present with thoracic dextroconvex curve and mild lumbar levocurvature. No osseous metastatic lesions are noted.       Right renal mass measuring up to 3.4 cm is suspicious for renal carcinoma. No metastatic disease is noted.   Right innominate vein stent is occluded with collateral vessels through the chest and abdominal wall, greater on the right than the left with drainage through the azygous system.   Borderline to mild splenomegaly   4.1 cm aneurysmal dilatation of the ascending aorta     MACRO: Mony aGrcia discussed the significance and urgency of this critical finding by epic secure chat with  HUSSEIN ROGERS on 10/12/2023 at 11:30am with confirmation reply received 11:55 am. (**-RCF-**) Findings:  See findings.   Signed by: Mony Garcia 10/12/2023 11:59 AM Dictation workstation:   [id number]    XR chest 2 views    Result Date: 10/11/2023  Interpreted By:  Shlomo Marinelli, STUDY: XR CHEST 2 VIEWS;  10/11/2023 6:43 pm   INDICATION: Signs/Symptoms:weakness.   COMPARISON: 02/16/2023   ACCESSION NUMBER(S): [id number]   ORDERING CLINICIAN: JOSE ART   FINDINGS: Heart size is enlarged. Vascular stent is similar. Tortuous thoracic aorta. No pneumonia or pulmonary edema. No pneumothorax. Probable changes of old healed granulomas disease       1.  No evidence of acute cardiopulmonary process.  Stable exam       MACRO: None   Signed by: Shlomo Marinelli  10/11/2023 7:23 PM Dictation workstation:   [id number]        Current Facility-Administered Medications:     B complex-vitamin C-folic acid (Nephrocaps) capsule 1 capsule, 1 capsule, oral, Daily, Tank Weems MD    [START ON 10/13/2023] buPROPion SR (Wellbutrin SR) 12 hr tablet 200 mg, 200 mg, oral, Daily, Farnaz Booker MD    calcium acetate (Phoslo) capsule 667 mg, 667 mg, oral, TID with meals, Farnaz Booker MD, 667 mg at 10/12/23 1250    divalproex (Depakote ER) 24 hr tablet 250 mg, 250 mg, oral, Daily, Scott Marie DO, 250 mg at 10/11/23 2222    heparin (porcine) injection 5,000 Units, 5,000 Units, subcutaneous, q8h, Farnaz Booker MD, 5,000 Units at 10/12/23 1104    midodrine (Proamatine) tablet 10 mg, 10 mg, oral, Daily PRN, Farnaz Booker MD    polyethylene glycol (Glycolax, Miralax) packet 17 g, 17 g, oral, Daily, Ciara Gibson DO, 17 g at 10/12/23 0900    sennosides (Senokot) tablet 17.2 mg, 2 tablet, oral, BID, Ciara Gibson DO, 17.2 mg at 10/12/23 0900    venlafaxine XR (Effexor-XR) 24 hr capsule 37.5 mg, 37.5 mg, oral, Daily with breakfast, Scott Marie DO, 37.5 mg at 10/12/23 0852    venlafaxine XR (Effexor-XR) 24 hr capsule 75 mg, 75 mg, oral, Daily with breakfast, Scott Marie DO, 75 mg at 10/12/23 0850     Physical Exam  Constitutional:       Appearance: He is underweight.   HENT:      Head: Normocephalic and atraumatic.   Eyes:      General: No scleral icterus.     Extraocular Movements: Extraocular movements intact.   Cardiovascular:      Rate and Rhythm: Normal rate.      Pulses: Normal pulses.      Heart sounds: Murmur heard.      Systolic murmur is present.   Pulmonary:      Effort: Pulmonary effort is normal.      Breath sounds: Normal breath sounds.   Abdominal:      General: Abdomen is flat. Bowel sounds are normal.      Palpations: Abdomen is soft.      Tenderness: There is no abdominal tenderness.   Musculoskeletal:      Cervical  back: Full passive range of motion without pain and neck supple.      Comments: AV fistula in place in left upper extremity, no signs of infection.   Skin:     General: Skin is warm and dry.      Capillary Refill: Capillary refill takes less than 2 seconds.   Neurological:      General: No focal deficit present.      Mental Status: He is alert and oriented to person, place, and time. Mental status is at baseline.      Motor: Weakness present.      Comments: Strength 4/5 bilaterally in upper and lower extremities. No focal deficit.    Psychiatric:         Mood and Affect: Mood is depressed. Affect is flat and tearful.         Speech: Speech normal. Speech is not rapid and pressured.         Behavior: Behavior is withdrawn. Behavior is cooperative.          Assessment/Plan    Principal Problem:    Weakness  Active Problems:    ESRD on dialysis (CMS/[facility])    Macrocytic anemia      John Nyhan is a 72-year-old male with a past medical history of ESRD on hemodialysis who presented to the [facility] ED from his dialysis facility due to the complaint of generalized weakness for approximately 3 weeks.  Reduced appetite however no other complaints at this time.  Per chart review, patient has had previous admissions for similar complaints requiring placement for rehab.     #Generalized Weakness, fatigue  #ESRD on HD (Monday, Wednesday, Friday)  #Macrocytic Anemia  #Malnutrition  #Anhedonia  - Will obtain CT chest/abdomen/pelvis with IV contrast to evaluate possible malignancy causing patient's current presentation.   - Psychiatry consult given his flat, tearful affect and anhedonia. During previous admission his Wellbutrin dose was increased. Appreciate recommendations.   - Suspect patient will likely require placement with potential need for assisted living after discharge. Social work consulted.   - Nephrology on consultation for hemodialysis - MWF.  - Continue to monitor electrolytes with AM RFP  - PT/OT in place  - Daily CBC,  BMP  - TSH wnl     #Elevated troponins  #Hx CAD  - Trop 123, 120 and then 105. Likely elevated in the setting of ESRD  - EKG without any evidence of acute ischemia  - Patient denies any chest pain shortness of breath.  No concern for ACS at this time  - Cardiology consulted from the ED, no recommendations at this time.  - We will continue to monitor.      Chronic  #Bipolar disorder  #Essential hypertension  -Continue home medications  -Continue home medications once medication reconciliation is complete     DVT prophylaxis: Patient is low risk for DVT, no chemical prophylaxis at this time  Diet: Renal  CODE STATUS: Full code    I saw this patient with the above medical student/intern physician and performed my own history and physical examination.   My subjective and objective findings are reflected in the above documentation.  The assessment and plan reflect my input. My edits are included in the above documentation.  Discussed with attending.  Arias Sultana DO, PGY-3    -CT CAP - Right renal mass measuring up to 3.4 cm is suspicious for renal carcinoma. No metastatic disease is noted. Right innominate vein stent is occluded with collateral vessels through the chest and abdominal wall, greater on the right than the left with drainage through the azygous system. Borderline to mild splenomegaly 4.1 cm aneurysmal dilatation of the ascending aorta  -Vascular/Urology consulted     Chris Valencia

## 2023-10-12 NOTE — PROGRESS NOTES
John W Nyhan is a 73 y.o. male on day 1 of admission presenting with Weakness.      Subjective   Patient is well known to renal service  Admitted with increased weakness, loss of appetite, depression       Objective     Last Recorded Vitals  /76 (BP Location: Right arm, Patient Position: Lying)   Pulse 93   Temp 36.7 °C (98.1 °F) (Temporal)   Resp 18   Wt 58.1 kg (128 lb)   SpO2 100%   Intake/Output last 3 Shifts:    Intake/Output Summary (Last 24 hours) at 10/12/2023 1342  Last data filed at 10/12/2023 0900  Gross per 24 hour   Intake 120 ml   Output --   Net 120 ml       Admission Weight  Weight: 58.1 kg (128 lb) (10/11/23 1606)    Daily Weight  10/11/23 : 58.1 kg (128 lb)    Image Results  CT chest abdomen pelvis w IV contrast  Narrative: Interpreted By:  oMny Garcia,   STUDY:  CT CHEST ABDOMEN PELVIS W IV CONTRAST; ;  10/12/2023 10:47 am      INDICATION:  Signs/Symptoms:Unintentional weight loss, weakness.      COMPARISON:  None.      ACCESSION NUMBER(S):  [id number]      ORDERING CLINICIAN:  HUSSEIN ROGERS      TECHNIQUE:  Imaging was performed from thoracic apex through ischial tuberosities  with axial, coronal and sagittal images reconstructed. Patient  received 75 mL Omnipaque 350 intravenously.      FINDINGS:  Trachea and mainstem bronchi are patent with no endoluminal mass. No  pneumonia or pleural effusion is noted. There is no suspicious  pulmonary nodule or mass.      Thyroid gland is nonenlarged. Densely calcified right paratracheal  and right hilar lymph nodes are noted. There are no enlarged  noncalcified mediastinal or hilar lymph nodes.      A right innominate vein stent is present which has no luminal  opacification indicating occlusion. The right internal jugular vein  is occluded just proximal to the stent. Left innominate vein is  patent and opacifies the superior vena cava at the terminus of the  right-sided stent at the junction with the vena cava. Vena cava is  opacify into  the right atrium.      There are diffuse collateral opacify vessels throughout the chest  wall, larger on the right than on the left. These collateral vessels  extend into the abdomen. Collateral venous drainage extends through  paravertebral veins into the azygous system and the azygous vein is  dilated from collateral drainage into the vena cava.      Descending aorta is tortuous but nondilated. Ascending aorta has  fusiform dilatation measuring up to 4.1 cm. There is no significant  atherosclerotic calcification.      Diffuse coronary calcifications are present. The heart is nonenlarged  but the left ventricle shows concentric hypertrophy. No pericardial  effusion is present.      No mass is noted in the liver. There is no intra or extrahepatic  biliary dilatation. Gallbladder is normal in appearance.      No mass or inflammation is noted involving the pancreas.      Spleen is borderline to mildly enlarged measuring 12.7 x 13.1 x 4.9  cm. A few subcapsular hypodensities are noted in the spleen measuring  1 cm or less, nonspecific. A few scattered splenic calcifications are  present..      Adrenal glands appear normal.      Kidneys are atrophic bilaterally with multiple cysts and multiple  subcentimeter hypodensities. At mid right kidney laterally there is a  lobulated heterogeneously enhancing mass which measures 3.4 x 3.2 x  3.2 cm. There is no dilatation of the right renal vein identified. No  hydronephrosis is noted in either kidney.      Bowel loops are nondilated. Appendix is not identified. No ascites,  pneumoperitoneum or mesenteric inflammation is identified.      Aorta and iliac arteries are diffusely atherosclerotic with no focal  aneurysm. Vena cava is normal in size with no gross evidence of  thrombus. There are no pathologically enlarged retroperitoneal lymph  nodes.      Urinary bladder is physiologically full with diffuse bladder wall  mild thickening consistent with hypertrophy.      Prostate  gland measures 3.9 x 3.7 x 5.0 cm.      Small right indirect inguinal hernia contains fat and a portion of  small bowel loop. At left inguinal ring there is bulging of  mesenteric fat and small bowel, borderline hernia.      Bilateral L5 spondylolysis is associated with grade 1  spondylolisthesis. The L5-S1 disc is severely narrowed and there is  moderate to severe adjacent endplate degenerative change. Scoliosis  is present with thoracic dextroconvex curve and mild lumbar  levocurvature. No osseous metastatic lesions are noted.      Impression: Right renal mass measuring up to 3.4 cm is suspicious for renal  carcinoma. No metastatic disease is noted.      Right innominate vein stent is occluded with collateral vessels  through the chest and abdominal wall, greater on the right than the  left with drainage through the azygous system.      Borderline to mild splenomegaly      4.1 cm aneurysmal dilatation of the ascending aorta          MACRO:  Mony Garcia discussed the significance and urgency of this  critical finding by epic secure chat with  HUSSEIN ROGERS on  10/12/2023 at 11:30am with confirmation reply received 11:55 am.  (**-RCF-**) Findings:  See findings.      Signed by: Mony Garcia 10/12/2023 11:59 AM  Dictation workstation:   [id number]      Physical Exam    Neck: supple  Lung: no wheeze  Cv: [id number]  exT: no /ml    Relevant Results             Results for orders placed or performed during the hospital encounter of 10/11/23 (from the past 24 hour(s))   Sars-CoV-2 PCR, Symptomatic   Result Value Ref Range    Coronavirus 2019, PCR Not Detected Not Detected   Influenza A, and B PCR   Result Value Ref Range    Flu A Result Not Detected Not Detected    Flu B Result Not Detected Not Detected   CBC and Auto Differential   Result Value Ref Range    WBC 8.6 4.4 - 11.3 x10*3/uL    nRBC 0.0 0.0 - 0.0 /100 WBCs    RBC 3.30 (L) 4.50 - 5.90 x10*6/uL    Hemoglobin 11.0 (L) 13.5 - 17.5 g/dL    Hematocrit 33.7 (L)  41.0 - 52.0 %     (H) 80 - 100 fL    MCH 33.3 26.0 - 34.0 pg    MCHC 32.6 32.0 - 36.0 g/dL    RDW 14.0 11.5 - 14.5 %    Platelets 113 (L) 150 - 450 x10*3/uL    MPV 10.5 7.5 - 11.5 fL    Neutrophils % 42.6 40.0 - 80.0 %    Immature Granulocytes %, Automated 0.2 0.0 - 0.9 %    Lymphocytes % 46.0 13.0 - 44.0 %    Monocytes % 9.4 2.0 - 10.0 %    Eosinophils % 1.6 0.0 - 6.0 %    Basophils % 0.2 0.0 - 2.0 %    Neutrophils Absolute 3.65 1.60 - 5.50 x10*3/uL    Immature Granulocytes Absolute, Automated 0.02 0.00 - 0.50 x10*3/uL    Lymphocytes Absolute 3.95 (H) 0.80 - 3.00 x10*3/uL    Monocytes Absolute 0.81 (H) 0.05 - 0.80 x10*3/uL    Eosinophils Absolute 0.14 0.00 - 0.40 x10*3/uL    Basophils Absolute 0.02 0.00 - 0.10 x10*3/uL   Comprehensive Metabolic Panel   Result Value Ref Range    Glucose 102 (H) 74 - 99 mg/dL    Sodium 141 136 - 145 mmol/L    Potassium 3.3 (L) 3.5 - 5.3 mmol/L    Chloride 95 (L) 98 - 107 mmol/L    Bicarbonate 33 (H) 21 - 32 mmol/L    Anion Gap 16 10 - 20 mmol/L    Urea Nitrogen 29 (H) 6 - 23 mg/dL    Creatinine 2.80 (H) 0.50 - 1.30 mg/dL    eGFR 23 (L) >60 mL/min/1.73m*2    Calcium 10.4 (H) 8.6 - 10.3 mg/dL    Albumin 4.2 3.4 - 5.0 g/dL    Alkaline Phosphatase 100 33 - 136 U/L    Total Protein 6.6 6.4 - 8.2 g/dL    AST 22 9 - 39 U/L    Bilirubin, Total 1.0 0.0 - 1.2 mg/dL    ALT 21 10 - 52 U/L   Magnesium   Result Value Ref Range    Magnesium 2.17 1.60 - 2.40 mg/dL   Troponin I, High Sensitivity, Initial   Result Value Ref Range    Troponin I, High Sensitivity 123 (HH) 0 - 20 ng/L   Urinalysis with Reflex Microscopic   Result Value Ref Range    Color, Urine Yellow Straw, Yellow    Appearance, Urine Clear Clear    Specific Gravity, Urine 1.004 (N) 1.005 - 1.035    pH, Urine 9.0 (N) 5.0, 5.5, 6.0, 6.5, 7.0, 7.5, 8.0    Protein, Urine 100 (2+) (N) NEGATIVE mg/dL    Glucose, Urine 50 (1+) (A) NEGATIVE mg/dL    Blood, Urine NEGATIVE NEGATIVE    Ketones, Urine NEGATIVE NEGATIVE mg/dL    Bilirubin,  Urine NEGATIVE NEGATIVE    Urobilinogen, Urine <2.0 <2.0 mg/dL    Nitrite, Urine NEGATIVE NEGATIVE    Leukocyte Esterase, Urine NEGATIVE NEGATIVE   Urine Gray Tube   Result Value Ref Range    Extra Tube Hold for add-ons.    Urinalysis Microscopic Only   Result Value Ref Range    WBC, Urine NONE 1-5, NONE /HPF    RBC, Urine NONE NONE, 1-2, 3-5 /HPF   Troponin I, High Sensitivity, Initial   Result Value Ref Range    Troponin I, High Sensitivity 120 (HH) 0 - 20 ng/L   Troponin, High Sensitivity, 1 Hour   Result Value Ref Range    Troponin I, High Sensitivity 105 (HH) 0 - 20 ng/L   TSH with reflex to Free T4 if abnormal   Result Value Ref Range    Thyroid Stimulating Hormone 2.63 0.44 - 3.98 mIU/L        Assessment/Plan        Assessment  ESRD on HD MWF  Failure to thrive/weight loss  Renal mass  Depression  Anemia of ckd    Plan   [place] on Hd Mwf schedule  Goal phos level 3.5-5.5  Hg is adequate renal MVI  Await Psych eval    Thank you           Tank Weems MD

## 2023-10-12 NOTE — CARE PLAN
Problem: PT Problem  Goal: Pt will be able to perform all bed mobility tasks with Mod I.   Outcome: Progressing  Goal: Pt will perform all transfers with Mod I and FWW with proper safety mechanics.    Outcome: Progressing  Goal: Pt will ambulate 300ft with Mod I using FWW for improved functional independence.   Outcome: Progressing  Goal: Pt will be able to negotiate 2 steps without HR with Mod I.   Outcome: Progressing  Goal: Pt will demonstrate at least 4+/5 BLE strength for ease with functional mobility.    Outcome: Progressing

## 2023-10-12 NOTE — H&P
History Of Present Illness  John W Nyhan is a 73 y.o. male presenting with past medical history of ESRD on hemodialysis (MWF), hypertension, hyperlipidemia, CAD, bipolar disorder who presented to [facility] emergency department from his dialysis office due to generalized weakness for several weeks duration.  Patient also reports of diminished appetite.  He denies any fever, chills, chest pain, shortness of breath, cough, nausea, vomiting, diarrhea, abdominal pain or any other complaints at this time.  Patient lives by himself in an apartment he lives with a cane/walker.  Patient states he has no relatives that live near him, closest relative is his brother who lives in [place].  Patient has had difficulty obtaining food, states he eats cold sandwiches and protein shakes at home.  Patient did receive hemodialysis this morning.  Of note, the patient was admitted in February 2023 for similar complaints and was discharged to [facility].  States he was previously in assisted living however recently started living back in his own apartment.    ED course:  Vital signs: Temperature 36.6, heart rate 87, respiratory rate 18, blood pressure 136/90, SPO2 98% on room air  Labs: CBC with hemoglobin 11.0 (baseline between 10 and 11) , platelets 113 (baseline 40s to 60s).  CMP remarkable for potassium 3.3, BUN 29, creatinine 2.8 (history of ESRD on dialysis).  Troponin 123-->120.  Urinalysis with 1+ glucose and 2+ protein  Imaging: Chest x-ray 2 view without any acute cardiopulmonary process.  EKG normal sinus rhythm at 66 bpm, left axis deviation seen on prior EKGs, SD interval 166, , QTc 442.  No signs of acute ST segment elevations or signs of ischemia  Intervention: Cardiology (Gustavo) was consulted from the emergency department due to troponin elevations who stated no need for intervention at this time.  Continue to monitor.  SW was also consulted in the ED. Patient was admitted to the  teaching service for further evaluation and management.    PMHx: ESRD on hemodialysis (MWF), HTN, HLD, CAD, bipolar disorder, gout, aortic stenosis  Past surgical history: Hemodialysis AV fistula  Social history: Denies tobacco use, EtOH use, or illicit drug use  Family history reviewed and noncontributory to today's complaint  CODE STATUS: Full code    Past Medical History  He has a past medical history of Bipolar disorder, unspecified (CMS/[facility]) and End stage renal disease (CMS/[facility]) (05/27/2015).    Surgical History  He has a past surgical history that includes Other surgical history (05/27/2015) and Tonsillectomy (05/27/2015).     Social History  He has no history on file for tobacco use, alcohol use, and drug use.    Family History  No family history on file.     Allergies  Patient has no known allergies.    Review of Systems    Constitutional: (+) Weakness, anorexia; denies  Fever, Chills    Eyes: Denies Blurry Vision, Vision Loss/ Change    ENMT: Denies Nasal Discharge, Nasal Congestion    Respiratory: Denies Dry Cough, Productive Cough, Wheezing, Shortness of Breath    Cardiac: Denies Chest Pain, Syncope, Palpitations    Gastrointestinal: Denies Nausea, Vomiting, Diarrhea, Constipation, Abdominal Pain    Genitourinary: Denies Discharge, Dysuria, Flank Pain    Musculoskeletal: Denies Pain, Swelling, Weakness    Neurological:  Denies Dizziness, Confusion, Headache, Syncope    Skin: Denies Pain, Rash, Ulcer    Endocrine: Denies Sweat, Polyuria    Hematologic/Lymph:  Denies Night Sweats, Petechiae    Allergic/Immunologic: Denies Anaphylaxis       Physical Exam    General: Awake, alert/oriented x4, well developed, no acute distress  Head: Atraumatic/Normocephalic  Eyes: Normal external exam, EOMI, PERRLA  ENT: Oropharynx normal. Uvula midline, no tonsillar edema, moist mucous membranes  Cardiovascular: RRR, S1/S2, systolic murmur noted, radial pulses +2, no edema of extremities  Pulmonary: CTAB, no respiratory  distress. No wheezes, rales, or ronchi  Abdomen: +BS, soft, non-tender, nondistended, no guarding or rebound, no masses noted  MSK: AV fistula to the left upper extremity, no joint swelling, normal movements of all extremities. Range of motion- normal.  Extremities: FROM, no edema, wounds, or contusions  Skin- No lesions, contusions, or erythema.  Neuro: Alert/oriented x4, no focal motor or sensory deficits  Psychiatric: Tearful, anxious appearing       Last Recorded Vitals  BP 91/58 (BP Location: Right arm, Patient Position: Lying)   Pulse 91   Temp 37.1 °C (98.8 °F) (Temporal)   Resp 18   Wt 58.1 kg (128 lb)   SpO2 97%     Relevant Results    Scheduled medications  buPROPion SR, 200 mg, oral, BID  divalproex, 250 mg, oral, Daily  [START ON 10/12/2023] polyethylene glycol, 17 g, oral, Daily  potassium chloride, 20 mEq, oral, Once  potassium chloride CR, 20 mEq, oral, Once  sennosides, 2 tablet, oral, BID  [START ON 10/12/2023] venlafaxine XR, 37.5 mg, oral, Daily with breakfast  [START ON 10/12/2023] venlafaxine XR, 75 mg, oral, Daily with breakfast      Results for orders placed or performed during the hospital encounter of 10/11/23 (from the past 24 hour(s))   Sars-CoV-2 PCR, Symptomatic   Result Value Ref Range    Coronavirus 2019, PCR Not Detected Not Detected   Influenza A, and B PCR   Result Value Ref Range    Flu A Result Not Detected Not Detected    Flu B Result Not Detected Not Detected   CBC and Auto Differential   Result Value Ref Range    WBC 8.6 4.4 - 11.3 x10*3/uL    nRBC 0.0 0.0 - 0.0 /100 WBCs    RBC 3.30 (L) 4.50 - 5.90 x10*6/uL    Hemoglobin 11.0 (L) 13.5 - 17.5 g/dL    Hematocrit 33.7 (L) 41.0 - 52.0 %     (H) 80 - 100 fL    MCH 33.3 26.0 - 34.0 pg    MCHC 32.6 32.0 - 36.0 g/dL    RDW 14.0 11.5 - 14.5 %    Platelets 113 (L) 150 - 450 x10*3/uL    MPV 10.5 7.5 - 11.5 fL    Neutrophils % 42.6 40.0 - 80.0 %    Immature Granulocytes %, Automated 0.2 0.0 - 0.9 %    Lymphocytes % 46.0 13.0 -  44.0 %    Monocytes % 9.4 2.0 - 10.0 %    Eosinophils % 1.6 0.0 - 6.0 %    Basophils % 0.2 0.0 - 2.0 %    Neutrophils Absolute 3.65 1.60 - 5.50 x10*3/uL    Immature Granulocytes Absolute, Automated 0.02 0.00 - 0.50 x10*3/uL    Lymphocytes Absolute 3.95 (H) 0.80 - 3.00 x10*3/uL    Monocytes Absolute 0.81 (H) 0.05 - 0.80 x10*3/uL    Eosinophils Absolute 0.14 0.00 - 0.40 x10*3/uL    Basophils Absolute 0.02 0.00 - 0.10 x10*3/uL   Comprehensive Metabolic Panel   Result Value Ref Range    Glucose 102 (H) 74 - 99 mg/dL    Sodium 141 136 - 145 mmol/L    Potassium 3.3 (L) 3.5 - 5.3 mmol/L    Chloride 95 (L) 98 - 107 mmol/L    Bicarbonate 33 (H) 21 - 32 mmol/L    Anion Gap 16 10 - 20 mmol/L    Urea Nitrogen 29 (H) 6 - 23 mg/dL    Creatinine 2.80 (H) 0.50 - 1.30 mg/dL    eGFR 23 (L) >60 mL/min/1.73m*2    Calcium 10.4 (H) 8.6 - 10.3 mg/dL    Albumin 4.2 3.4 - 5.0 g/dL    Alkaline Phosphatase 100 33 - 136 U/L    Total Protein 6.6 6.4 - 8.2 g/dL    AST 22 9 - 39 U/L    Bilirubin, Total 1.0 0.0 - 1.2 mg/dL    ALT 21 10 - 52 U/L   Magnesium   Result Value Ref Range    Magnesium 2.17 1.60 - 2.40 mg/dL   Troponin I, High Sensitivity, Initial   Result Value Ref Range    Troponin I, High Sensitivity 123 (HH) 0 - 20 ng/L   Urinalysis with Reflex Microscopic   Result Value Ref Range    Color, Urine Yellow Straw, Yellow    Appearance, Urine Clear Clear    Specific Gravity, Urine 1.004 (N) 1.005 - 1.035    pH, Urine 9.0 (N) 5.0, 5.5, 6.0, 6.5, 7.0, 7.5, 8.0    Protein, Urine 100 (2+) (N) NEGATIVE mg/dL    Glucose, Urine 50 (1+) (A) NEGATIVE mg/dL    Blood, Urine NEGATIVE NEGATIVE    Ketones, Urine NEGATIVE NEGATIVE mg/dL    Bilirubin, Urine NEGATIVE NEGATIVE    Urobilinogen, Urine <2.0 <2.0 mg/dL    Nitrite, Urine NEGATIVE NEGATIVE    Leukocyte Esterase, Urine NEGATIVE NEGATIVE   Urine Gray Tube   Result Value Ref Range    Extra Tube Hold for add-ons.    Urinalysis Microscopic Only   Result Value Ref Range    WBC, Urine NONE 1-5, NONE /HPF     RBC, Urine NONE NONE, 1-2, 3-5 /HPF   Troponin I, High Sensitivity, Initial   Result Value Ref Range    Troponin I, High Sensitivity 120 (HH) 0 - 20 ng/L        Assessment/Plan   Principal Problem:    Weakness  Active Problems:    ESRD on dialysis (CMS/[facility])      John Nyhan is a 72-year-old male with a past medical history of ESRD on hemodialysis who presented to the [facility] ED from his dialysis facility due to the complaint of generalized weakness for approximately 3 weeks.  Reduced appetite however no other complaints at this time.  Per chart review, patient has had previous admissions for similar complaints requiring placement for rehab.    #Generalized Weakness  #ESRD on HD MWF  #Macrocytic Anemia  -Suspect generalized weakness could be from progression of disease as well as diminished p.o. intake. Patient has had admissions in the patient for similar symptoms requiring inpatient rehab.  Suspect patient will likely require placement with potential need for assisted living after discharge  -Nephrology on consultation for hemodialysis  -Continue to monitor electrolytes with AM RFP  -PT/OT in place  -Folate, B12, TSH pending    #Elevated troponins  #Hx CAD  -Trop 123-->120. Likely elevated in the setting of ESRD  -EKG without any evidence of acute ischemia  -Patient denies any chest pain shortness of breath.  No concern for ACS at this time  -Cardiology consulted from the ED, no recommendations at this time.  -We will continue to monitor    #Bipolar disorder  -Continue home medications    #Essential hypertension  -Continue home medications once medication reconciliation is complete    DVT prophylaxis: Patient is low risk for DVT, no chemical prophylaxis at this time  Diet: Renal  CODE STATUS: Full code     Assessment and plan to be discussed with attending physician  Ciara Gibson, DO  Internal medicine, PGY 2

## 2023-10-12 NOTE — PROGRESS NOTES
Occupational Therapy    Occupational Therapy    Evaluation/Treatment    Patient Name: John W Nyhan  MRN: [id number]  : 1950  Today's Date: 10/12/23  Time Calculation  Start Time: 1217  Stop Time: 1235  Time Calculation (min): 18 min       Assessment:  OT Assessment:  (Pt with flat affect, yet cooperative with therapy)  Prognosis: Good  Evaluation/Treatment Tolerance: Patient tolerated treatment well  OT Assessment Results: Decreased ADL status, Decreased endurance, Decreased functional mobility  Prognosis: Good  Evaluation/Treatment Tolerance: Patient tolerated treatment well    Plan:  Treatment Interventions: ADL retraining, UE strengthening/ROM, Endurance training, Neuromuscular reeducation  OT Frequency: 3 times per week  OT Discharge Recommendations: Low intensity level of continued care; OT [facility]  Treatment Interventions: ADL retraining, UE strengthening/ROM, Endurance training, Neuromuscular reeducation    Subjective     Current Problem:  1. Weakness        2. Undernutrition        3. ESRD (end stage renal disease) (CMS/[facility])        4. Moderate episode of recurrent major depressive disorder (CMS/[facility])        5. At increased risk for social isolation        6. Financial difficulties        7. Elevated troponin          Past Medical History:   Diagnosis Date    Bipolar disorder, unspecified (CMS/HCC)     Bipolar disorder (manic depression)    End stage renal disease (CMS/HCC) 2015    ESRD (end stage renal disease) on dialysis     Past Surgical History:   Procedure Laterality Date    OTHER SURGICAL HISTORY  2015    Creation Of AV Fistula - Nonautogenous Graft    TONSILLECTOMY  2015    Tonsillectomy With Adenoidectomy       General:   OT Received On: 10/11/23  General  Reason for Referral: ADL's; Safety Assessment  Referred By: Farnaz Booker MD  Past Medical History Relevant to Rehab: bipolar; constipation; ESRD on dialysis    History Of Present Illness  John W Nyhan is a 73 y.o. male  presenting with past medical history of ESRD on hemodialysis (MWF), hypertension, hyperlipidemia, CAD, bipolar disorder who presented to [facility] emergency department from his dialysis office due to generalized weakness for several weeks duration.  Patient also reports of diminished appetite.  He denies any fever, chills, chest pain, shortness of breath, cough, nausea, vomiting, diarrhea, abdominal pain or any other complaints at this time.  Patient lives by himself in an apartment he lives with a cane/walker.  Patient states he has no relatives that live near him, closest relative is his brother who lives in [place].  Patient has had difficulty obtaining food, states he eats cold sandwiches and protein shakes at home.  Patient did receive hemodialysis this morning.  Of note, the patient was admitted in February 2023 for similar complaints and was discharged to [facility].  States he was previously in assisted living however recently started living back in his own apartment.     ED course:  Vital signs: Temperature 36.6, heart rate 87, respiratory rate 18, blood pressure 136/90, SPO2 98% on room air  Labs: CBC with hemoglobin 11.0 (baseline between 10 and 11) , platelets 113 (baseline 40s to 60s).  CMP remarkable for potassium 3.3, BUN 29, creatinine 2.8 (history of ESRD on dialysis).  Troponin 123-->120.  Urinalysis with 1+ glucose and 2+ protein  Imaging: Chest x-ray 2 view without any acute cardiopulmonary process.  EKG normal sinus rhythm at 66 bpm, left axis deviation seen on prior EKGs, FL interval 166, , QTc 442.  No signs of acute ST segment elevations or signs of ischemia  Intervention: Cardiology (Gustavo) was consulted from the emergency department due to troponin elevations who stated no need for intervention at this time.  Continue to monitor.  SW was also consulted in the ED. Patient was admitted to the teaching service for further evaluation and management.      PMHx: ESRD on hemodialysis (MWF), HTN, HLD, CAD, bipolar disorder, gout, aortic stenosis  Past surgical history: Hemodialysis AV fistula  Social history: Denies tobacco use, EtOH use, or illicit drug use  Family history reviewed and noncontributory to today's complaint  CODE STATUS: Full code       Precautions:  Medical Precautions: Fall precautions    Pain:  Pain Assessment  Pain Assessment: 0-10  Pain Score: 0 - No pain (Pt did nbot have any complaints of pain)    Objective   Cognition:  Overall Cognitive Status: Within Functional Limits (Pt flat wih depressed affect; pt labile at times.)    Home Living:  Pt lives home alone in a 1st floor apt. Pt was completing ADL's independently, and most IADL's. Pt with complaints of increased difficulty completing household tasks. Pt was driving, shopping and taking his laundry to a laundromat. Pt with complaints of recent decreased in endurance to complete tasks.   Pt was using his cane with ambulation, and believes he may have had about 3 falls in the past 6 months.     ADL History:  Eating Assistance: Independent  Grooming Assistance: Independent  Bathing Assistance: Stand by  UE Dressing Assistance: Stand by  LE Dressing Assistance: Minimal  Toileting Assistance with Device: Stand by    Extremities: RUE   RUE : Within Functional Limits and LUE   LUE: Within Functional Limits    Bed mobility/transfers:  supine-sit with supervision  Sit-stand: CGA  Stand-sit: CGA    Outcome Measures: [facility] Daily Activity  Putting on and taking off regular lower body clothing: A little  Bathing (including washing, rinsing, drying): A little  Putting on and taking off regular upper body clothing: None  Toileting, which includes using toilet, bedpan or urinal: None  Taking care of personal grooming such as brushing teeth: None  Eating Meals: None  Daily Activity - Total Score: 22      EDUCATION:  Education  Individual(s) Educated: Patient  Patient Response to Education: Patient/Caregiver  Verbalized Understanding of Information    Goals:  Encounter Problems       Encounter Problems (Active)       OT Goals       OT Goal 1 (Progressing)       Start:  10/12/23    Expected End:  10/26/23       Pt will complete all bed mobility with independence safely           OT Goal 2 (Progressing)       Start:  10/12/23    Expected End:  10/26/23       Pt with complete all transfers safely with modified independence           OT Goal 3 (Progressing)       Start:  10/12/23    Expected End:  10/26/23       Pt will complete ADL's and mobility with good sit balance and fair+ stand balance           OT Goal 4 (Progressing)       Start:  10/12/23    Expected End:  10/26/23       Pt with complete grooming ADL's with modified independence and fair+ stand balance           OT Goal 5 (Progressing)       Start:  10/12/23    Expected End:  10/26/23       Pt will complete UB dressing ADL's independently.                  Treatment:  Pt was able to complete bed mobility with supervision. Pt with good sit balance on EOB. Pt completed all transfers with CGA. Pt ambulated in room/kapadia with CGA using wheeled walker. Pt stood at sink with fair+ stand balance to complete grooming ADL's. Pt returned to and remained in bedside chair with chair alarm on and call light within reach.

## 2023-10-12 NOTE — PROGRESS NOTES
Patient lives home alone in an apartment. He is on dialysis with [facility] on [address], Mon, Wed, Frid. He provides his own transportation,. States he is able to purchase his food, but is not able to prepare it. He has no family in the area to assist. He does not like the Meals on Wheels. He does not have a microwave but does have an oven with a stove top. He is able to purchase his medications, received education, and takes as ordered. He is on social security. He does not want to go to a facility.  Social work consult and discussed with Lissy WARE.

## 2023-10-12 NOTE — CONSULTS
Referring Provider  Dr Booker    History Of Present Illness  John W Nyhan is a 73 y.o. male presenting with weakness. Pt was consulted for depression. Pt has a long H/O Bipolar depression on depakote, wellbutrin, and effexor.  Pt report that he has multiple medical issues, on dialysis for 14 years, getting weaker physically and has been worrying if he could ever get any better and if he would be able to stay at home. Pt has been to SNF recently and did not like the experience. Would like to get better    Pt noticed increase depression recently with helpless feeling, lack of interest, poor sleep, normal appetite. No suicidal thoughts. Has been anxious. No H or VH No paranoid thoughts    Patient has been seeing psychiatrist with [facility].   Has H/O Suicidal attempt 3 years ago     Past Medical History  He has a past medical history of Bipolar disorder, unspecified (CMS/[facility]) and End stage renal disease (CMS/Formerly [facility]) (05/27/2015).    Surgical History  He has a past surgical history that includes Other surgical history (05/27/2015) and Tonsillectomy (05/27/2015).     Social History  He reports that he has never smoked. He has never used smokeless tobacco. No history on file for alcohol use and drug use.  Lives alone, no children, brother live in [place]     Allergies  Patient has no known allergies.    Review of Systems    Psychiatric ROS - Adult  Anxiety: General Anxiety Disorder (VIANEY)VIANEY Behaviors: excessive anxiety/worry  Depression: anhedonia, concentration, energy, helpless, and sleep decreased   Delirium: negative  Psychosis: negative  Dora: negative  Safety Issues: none  Psychiatric ROS Comment:     Physical Exam        10/11/2023     7:11 PM 10/11/2023    10:39 PM 10/11/2023    11:00 PM 10/12/2023     3:49 AM 10/12/2023     7:00 AM 10/12/2023    12:00 PM 10/12/2023     4:00 PM   Vitals   Systolic 91 115 127 133 143 132 134   Diastolic 58 76 79 77 81 76 77   Heart Rate 91 70 99 99 84 93 88   Temp 37.1 °C (98.8 °F)  " 36.9 °C (98.4 °F) 36.4 °C (97.5 °F) 36.4 °C (97.5 °F) 36.7 °C (98.1 °F) 36.5 °C (97.7 °F)   Resp  20 18 16 17 18 18      Mental Status Exam  General: good eye contact  Appearance: normal built  Attitude: cooperative  Behavior: normal  Motor Activity: decreased  Speech: low volume  Mood: depressed 5/10  Affect: restricted  Thought Process: normal  Thought Content: helpless, not suicidal, no access to guns or weapons  Thought Perception: normal  Cognition: normal  Insight: normal  Judgement: normal    Psychiatric Risk Assessment  Violence Risk Assessment: none  Acute Risk of Harm to Others is Considered: low   Suicide Risk Assessment: age > 65 yrs old, chronic medical illness, male, and prior suicide attempt  Protective Factors against Suicide: adherence to  treatment, fear of suicide, and moral objections to suicide  Acute Risk of Harm to Self is Considered: low    Last Recorded Vitals  Blood pressure 134/77, pulse 88, temperature 36.5 °C (97.7 °F), temperature source Temporal, resp. rate 18, height 1.727 m (5' 8\"), weight 58.1 kg (128 lb), SpO2 98 %.    Relevant Results  Results for orders placed or performed during the hospital encounter of 10/11/23 (from the past 96 hour(s))   Sars-CoV-2 PCR, Symptomatic   Result Value Ref Range    Coronavirus 2019, PCR Not Detected Not Detected   Influenza A, and B PCR   Result Value Ref Range    Flu A Result Not Detected Not Detected    Flu B Result Not Detected Not Detected   CBC and Auto Differential   Result Value Ref Range    WBC 8.6 4.4 - 11.3 x10*3/uL    nRBC 0.0 0.0 - 0.0 /100 WBCs    RBC 3.30 (L) 4.50 - 5.90 x10*6/uL    Hemoglobin 11.0 (L) 13.5 - 17.5 g/dL    Hematocrit 33.7 (L) 41.0 - 52.0 %     (H) 80 - 100 fL    MCH 33.3 26.0 - 34.0 pg    MCHC 32.6 32.0 - 36.0 g/dL    RDW 14.0 11.5 - 14.5 %    Platelets 113 (L) 150 - 450 x10*3/uL    MPV 10.5 7.5 - 11.5 fL    Neutrophils % 42.6 40.0 - 80.0 %    Immature Granulocytes %, Automated 0.2 0.0 - 0.9 %    Lymphocytes % " 46.0 13.0 - 44.0 %    Monocytes % 9.4 2.0 - 10.0 %    Eosinophils % 1.6 0.0 - 6.0 %    Basophils % 0.2 0.0 - 2.0 %    Neutrophils Absolute 3.65 1.60 - 5.50 x10*3/uL    Immature Granulocytes Absolute, Automated 0.02 0.00 - 0.50 x10*3/uL    Lymphocytes Absolute 3.95 (H) 0.80 - 3.00 x10*3/uL    Monocytes Absolute 0.81 (H) 0.05 - 0.80 x10*3/uL    Eosinophils Absolute 0.14 0.00 - 0.40 x10*3/uL    Basophils Absolute 0.02 0.00 - 0.10 x10*3/uL   Comprehensive Metabolic Panel   Result Value Ref Range    Glucose 102 (H) 74 - 99 mg/dL    Sodium 141 136 - 145 mmol/L    Potassium 3.3 (L) 3.5 - 5.3 mmol/L    Chloride 95 (L) 98 - 107 mmol/L    Bicarbonate 33 (H) 21 - 32 mmol/L    Anion Gap 16 10 - 20 mmol/L    Urea Nitrogen 29 (H) 6 - 23 mg/dL    Creatinine 2.80 (H) 0.50 - 1.30 mg/dL    eGFR 23 (L) >60 mL/min/1.73m*2    Calcium 10.4 (H) 8.6 - 10.3 mg/dL    Albumin 4.2 3.4 - 5.0 g/dL    Alkaline Phosphatase 100 33 - 136 U/L    Total Protein 6.6 6.4 - 8.2 g/dL    AST 22 9 - 39 U/L    Bilirubin, Total 1.0 0.0 - 1.2 mg/dL    ALT 21 10 - 52 U/L   Magnesium   Result Value Ref Range    Magnesium 2.17 1.60 - 2.40 mg/dL   Troponin I, High Sensitivity, Initial   Result Value Ref Range    Troponin I, High Sensitivity 123 (HH) 0 - 20 ng/L   Urinalysis with Reflex Microscopic   Result Value Ref Range    Color, Urine Yellow Straw, Yellow    Appearance, Urine Clear Clear    Specific Gravity, Urine 1.004 (N) 1.005 - 1.035    pH, Urine 9.0 (N) 5.0, 5.5, 6.0, 6.5, 7.0, 7.5, 8.0    Protein, Urine 100 (2+) (N) NEGATIVE mg/dL    Glucose, Urine 50 (1+) (A) NEGATIVE mg/dL    Blood, Urine NEGATIVE NEGATIVE    Ketones, Urine NEGATIVE NEGATIVE mg/dL    Bilirubin, Urine NEGATIVE NEGATIVE    Urobilinogen, Urine <2.0 <2.0 mg/dL    Nitrite, Urine NEGATIVE NEGATIVE    Leukocyte Esterase, Urine NEGATIVE NEGATIVE   Urine Gray Tube   Result Value Ref Range    Extra Tube Hold for add-ons.    Urinalysis Microscopic Only   Result Value Ref Range    WBC, Urine NONE  1-5, NONE /HPF    RBC, Urine NONE NONE, 1-2, 3-5 /HPF   Troponin I, High Sensitivity, Initial   Result Value Ref Range    Troponin I, High Sensitivity 120 (HH) 0 - 20 ng/L   Folate   Result Value Ref Range    Folate, Serum >24.0 >5.0 ng/mL   Troponin, High Sensitivity, 1 Hour   Result Value Ref Range    Troponin I, High Sensitivity 105 (HH) 0 - 20 ng/L   TSH with reflex to Free T4 if abnormal   Result Value Ref Range    Thyroid Stimulating Hormone 2.63 0.44 - 3.98 mIU/L     CT chest abdomen pelvis w IV contrast    Result Date: 10/12/2023  Interpreted By:  Mony Garcia, STUDY: CT CHEST ABDOMEN PELVIS W IV CONTRAST; ;  10/12/2023 10:47 am   INDICATION: Signs/Symptoms:Unintentional weight loss, weakness.   COMPARISON: None.   ACCESSION NUMBER(S): [id number]   ORDERING CLINICIAN: HUSSEIN ROGERS   TECHNIQUE: Imaging was performed from thoracic apex through ischial tuberosities with axial, coronal and sagittal images reconstructed. Patient received 75 mL Omnipaque 350 intravenously.   FINDINGS: Trachea and mainstem bronchi are patent with no endoluminal mass. No pneumonia or pleural effusion is noted. There is no suspicious pulmonary nodule or mass.   Thyroid gland is nonenlarged. Densely calcified right paratracheal and right hilar lymph nodes are noted. There are no enlarged noncalcified mediastinal or hilar lymph nodes.   A right innominate vein stent is present which has no luminal opacification indicating occlusion. The right internal jugular vein is occluded just proximal to the stent. Left innominate vein is patent and opacifies the superior vena cava at the terminus of the right-sided stent at the junction with the vena cava. Vena cava is opacify into the right atrium.   There are diffuse collateral opacify vessels throughout the chest wall, larger on the right than on the left. These collateral vessels extend into the abdomen. Collateral venous drainage extends through paravertebral veins into the azygous  system and the azygous vein is dilated from collateral drainage into the vena cava.   Descending aorta is tortuous but nondilated. Ascending aorta has fusiform dilatation measuring up to 4.1 cm. There is no significant atherosclerotic calcification.   Diffuse coronary calcifications are present. The heart is nonenlarged but the left ventricle shows concentric hypertrophy. No pericardial effusion is present.   No mass is noted in the liver. There is no intra or extrahepatic biliary dilatation. Gallbladder is normal in appearance.   No mass or inflammation is noted involving the pancreas.   Spleen is borderline to mildly enlarged measuring 12.7 x 13.1 x 4.9 cm. A few subcapsular hypodensities are noted in the spleen measuring 1 cm or less, nonspecific. A few scattered splenic calcifications are present..   Adrenal glands appear normal.   Kidneys are atrophic bilaterally with multiple cysts and multiple subcentimeter hypodensities. At mid right kidney laterally there is a lobulated heterogeneously enhancing mass which measures 3.4 x 3.2 x 3.2 cm. There is no dilatation of the right renal vein identified. No hydronephrosis is noted in either kidney.   Bowel loops are nondilated. Appendix is not identified. No ascites, pneumoperitoneum or mesenteric inflammation is identified.   Aorta and iliac arteries are diffusely atherosclerotic with no focal aneurysm. Vena cava is normal in size with no gross evidence of thrombus. There are no pathologically enlarged retroperitoneal lymph nodes.   Urinary bladder is physiologically full with diffuse bladder wall mild thickening consistent with hypertrophy.   Prostate gland measures 3.9 x 3.7 x 5.0 cm.   Small right indirect inguinal hernia contains fat and a portion of small bowel loop. At left inguinal ring there is bulging of mesenteric fat and small bowel, borderline hernia.   Bilateral L5 spondylolysis is associated with grade 1 spondylolisthesis. The L5-S1 disc is severely  narrowed and there is moderate to severe adjacent endplate degenerative change. Scoliosis is present with thoracic dextroconvex curve and mild lumbar levocurvature. No osseous metastatic lesions are noted.       Right renal mass measuring up to 3.4 cm is suspicious for renal carcinoma. No metastatic disease is noted.   Right innominate vein stent is occluded with collateral vessels through the chest and abdominal wall, greater on the right than the left with drainage through the azygous system.   Borderline to mild splenomegaly   4.1 cm aneurysmal dilatation of the ascending aorta     MACRO: Mony Garcia discussed the significance and urgency of this critical finding by epic secure chat with  LAURENHIJAVID KEN on 10/12/2023 at 11:30am with confirmation reply received 11:55 am. (**-RCF-**) Findings:  See findings.   Signed by: Mony Garcia 10/12/2023 11:59 AM Dictation workstation:   [id number]    XR chest 2 views    Result Date: 10/11/2023  Interpreted By:  Shlomo Marinelli, STUDY: XR CHEST 2 VIEWS;  10/11/2023 6:43 pm   INDICATION: Signs/Symptoms:weakness.   COMPARISON: 02/16/2023   ACCESSION NUMBER(S): [id number]   ORDERING CLINICIAN: JOSE ART   FINDINGS: Heart size is enlarged. Vascular stent is similar. Tortuous thoracic aorta. No pneumonia or pulmonary edema. No pneumothorax. Probable changes of old healed granulomas disease       1.  No evidence of acute cardiopulmonary process.  Stable exam       MACRO: None   Signed by: Shlomo Marinelli 10/11/2023 7:23 PM Dictation workstation:   [id number]             Assessment/Plan   Bipolar depression  VIANEY    Plan:    Pt is not at imminent risk of suicide  However he is clinically depressed and anxious  Will benefit from adjusting his psych medication- Increasing Effexor to 150 mg, continue other medication  Add Melatonin for sleep  Pt can be discharged from psych standpoint when medically stable  F/U with his psychiatrist at [facility] on discharge          Medication Consent  Medication Consent: risks, benefits, side effects reviewed for all ordered meds    Burton Honeycutt MD

## 2023-10-12 NOTE — CONSULTS
Consults    Reason For Consult  Patient referred to inpatient stay by Dr. Joseph who was his nephrologist at [facility].     History Of Present Illness  John W Nyhan is a 73 y.o. male presenting with generalized weakness, undernutrition, and poor mood for the past month sent by his nephrologist at [facility] for possible placement for acute rehab and placement into a care facility with hemodialysis. He was discharged from [facility] nursing [facility] in [place] and would not like to return. He reports reduced appetite stating that he has been eating and cooking less. He denies recent weight loss. He reports loss of interest in activities he used to like such as driving through the park. He does not have family support as his closest family member is his brother in [place]. He lives by himself and stated that it has become harder to take care of himself.     Past Medical History  He has a past medical history of Bipolar disorder, unspecified (CMS/[facility]) and End stage renal disease (CMS/[facility]) (05/27/2015).    Surgical History  He has a past surgical history that includes Other surgical history (05/27/2015) and Tonsillectomy (05/27/2015).     Social History  He reports that he has never smoked. He has never used smokeless tobacco. No history on file for alcohol use and drug use.    Family History  No family history on file.     Allergies  Patient has no known allergies.    Review of Systems  Constitutional: Weakness, thin  Eyes: no vision changes  ENMT: No nasal discharge or congestion  Respiratory: No cough, wheezing, or SOB  Cardiac: No chest Pain, syncope, palpitations  Gastrointestinal: No nausea, vomiting, diarrhea, constipation, abdominal pain  Musculoskeletal: No pain but generalized weakness  Neurological:  No dizziness, confusion  Skin: No rash     Physical Exam  General: Alert and oriented x 3  Head: Normocephalic  ENT: MMM  Cardiovascular: rate and rhythm normal  Pulmonary: CTAB, no wheezes, rales, or rhonchi  Abdomen: No  guarding, no masses  MSK: AVF on LUE, thin  Neuro: Alert & oriented x3, is able to carry a conversation.   Psych: crying mid-conversation, appears sad, is not hopeful     Last Recorded Vitals  /76 (BP Location: Right arm, Patient Position: Lying)   Pulse 93   Temp 36.7 °C (98.1 °F) (Temporal)   Resp 18   Wt 58.1 kg (128 lb)   SpO2 100%     Relevant Results  Results for orders placed or performed during the hospital encounter of 10/11/23 (from the past 24 hour(s))   Sars-CoV-2 PCR, Symptomatic   Result Value Ref Range    Coronavirus 2019, PCR Not Detected Not Detected   Influenza A, and B PCR   Result Value Ref Range    Flu A Result Not Detected Not Detected    Flu B Result Not Detected Not Detected   CBC and Auto Differential   Result Value Ref Range    WBC 8.6 4.4 - 11.3 x10*3/uL    nRBC 0.0 0.0 - 0.0 /100 WBCs    RBC 3.30 (L) 4.50 - 5.90 x10*6/uL    Hemoglobin 11.0 (L) 13.5 - 17.5 g/dL    Hematocrit 33.7 (L) 41.0 - 52.0 %     (H) 80 - 100 fL    MCH 33.3 26.0 - 34.0 pg    MCHC 32.6 32.0 - 36.0 g/dL    RDW 14.0 11.5 - 14.5 %    Platelets 113 (L) 150 - 450 x10*3/uL    MPV 10.5 7.5 - 11.5 fL    Neutrophils % 42.6 40.0 - 80.0 %    Immature Granulocytes %, Automated 0.2 0.0 - 0.9 %    Lymphocytes % 46.0 13.0 - 44.0 %    Monocytes % 9.4 2.0 - 10.0 %    Eosinophils % 1.6 0.0 - 6.0 %    Basophils % 0.2 0.0 - 2.0 %    Neutrophils Absolute 3.65 1.60 - 5.50 x10*3/uL    Immature Granulocytes Absolute, Automated 0.02 0.00 - 0.50 x10*3/uL    Lymphocytes Absolute 3.95 (H) 0.80 - 3.00 x10*3/uL    Monocytes Absolute 0.81 (H) 0.05 - 0.80 x10*3/uL    Eosinophils Absolute 0.14 0.00 - 0.40 x10*3/uL    Basophils Absolute 0.02 0.00 - 0.10 x10*3/uL   Comprehensive Metabolic Panel   Result Value Ref Range    Glucose 102 (H) 74 - 99 mg/dL    Sodium 141 136 - 145 mmol/L    Potassium 3.3 (L) 3.5 - 5.3 mmol/L    Chloride 95 (L) 98 - 107 mmol/L    Bicarbonate 33 (H) 21 - 32 mmol/L    Anion Gap 16 10 - 20 mmol/L    Urea  Nitrogen 29 (H) 6 - 23 mg/dL    Creatinine 2.80 (H) 0.50 - 1.30 mg/dL    eGFR 23 (L) >60 mL/min/1.73m*2    Calcium 10.4 (H) 8.6 - 10.3 mg/dL    Albumin 4.2 3.4 - 5.0 g/dL    Alkaline Phosphatase 100 33 - 136 U/L    Total Protein 6.6 6.4 - 8.2 g/dL    AST 22 9 - 39 U/L    Bilirubin, Total 1.0 0.0 - 1.2 mg/dL    ALT 21 10 - 52 U/L   Magnesium   Result Value Ref Range    Magnesium 2.17 1.60 - 2.40 mg/dL   Troponin I, High Sensitivity, Initial   Result Value Ref Range    Troponin I, High Sensitivity 123 (HH) 0 - 20 ng/L   Urinalysis with Reflex Microscopic   Result Value Ref Range    Color, Urine Yellow Straw, Yellow    Appearance, Urine Clear Clear    Specific Gravity, Urine 1.004 (N) 1.005 - 1.035    pH, Urine 9.0 (N) 5.0, 5.5, 6.0, 6.5, 7.0, 7.5, 8.0    Protein, Urine 100 (2+) (N) NEGATIVE mg/dL    Glucose, Urine 50 (1+) (A) NEGATIVE mg/dL    Blood, Urine NEGATIVE NEGATIVE    Ketones, Urine NEGATIVE NEGATIVE mg/dL    Bilirubin, Urine NEGATIVE NEGATIVE    Urobilinogen, Urine <2.0 <2.0 mg/dL    Nitrite, Urine NEGATIVE NEGATIVE    Leukocyte Esterase, Urine NEGATIVE NEGATIVE   Urine Gray Tube   Result Value Ref Range    Extra Tube Hold for add-ons.    Urinalysis Microscopic Only   Result Value Ref Range    WBC, Urine NONE 1-5, NONE /HPF    RBC, Urine NONE NONE, 1-2, 3-5 /HPF   Troponin I, High Sensitivity, Initial   Result Value Ref Range    Troponin I, High Sensitivity 120 (HH) 0 - 20 ng/L   Folate   Result Value Ref Range    Folate, Serum >24.0 >5.0 ng/mL   Troponin, High Sensitivity, 1 Hour   Result Value Ref Range    Troponin I, High Sensitivity 105 (HH) 0 - 20 ng/L   TSH with reflex to Free T4 if abnormal   Result Value Ref Range    Thyroid Stimulating Hormone 2.63 0.44 - 3.98 mIU/L     CT chest abdomen pelvis w IV contrast    Result Date: 10/12/2023  Interpreted By:  Mony Garcia, STUDY: CT CHEST ABDOMEN PELVIS W IV CONTRAST; ;  10/12/2023 10:47 am   INDICATION: Signs/Symptoms:Unintentional weight loss,  weakness.   COMPARISON: None.   ACCESSION NUMBER(S): [id number]   ORDERING CLINICIAN: HUSSEIN ROGERS   TECHNIQUE: Imaging was performed from thoracic apex through ischial tuberosities with axial, coronal and sagittal images reconstructed. Patient received 75 mL Omnipaque 350 intravenously.   FINDINGS: Trachea and mainstem bronchi are patent with no endoluminal mass. No pneumonia or pleural effusion is noted. There is no suspicious pulmonary nodule or mass.   Thyroid gland is nonenlarged. Densely calcified right paratracheal and right hilar lymph nodes are noted. There are no enlarged noncalcified mediastinal or hilar lymph nodes.   A right innominate vein stent is present which has no luminal opacification indicating occlusion. The right internal jugular vein is occluded just proximal to the stent. Left innominate vein is patent and opacifies the superior vena cava at the terminus of the right-sided stent at the junction with the vena cava. Vena cava is opacify into the right atrium.   There are diffuse collateral opacify vessels throughout the chest wall, larger on the right than on the left. These collateral vessels extend into the abdomen. Collateral venous drainage extends through paravertebral veins into the azygous system and the azygous vein is dilated from collateral drainage into the vena cava.   Descending aorta is tortuous but nondilated. Ascending aorta has fusiform dilatation measuring up to 4.1 cm. There is no significant atherosclerotic calcification.   Diffuse coronary calcifications are present. The heart is nonenlarged but the left ventricle shows concentric hypertrophy. No pericardial effusion is present.   No mass is noted in the liver. There is no intra or extrahepatic biliary dilatation. Gallbladder is normal in appearance.   No mass or inflammation is noted involving the pancreas.   Spleen is borderline to mildly enlarged measuring 12.7 x 13.1 x 4.9 cm. A few subcapsular hypodensities are  noted in the spleen measuring 1 cm or less, nonspecific. A few scattered splenic calcifications are present..   Adrenal glands appear normal.   Kidneys are atrophic bilaterally with multiple cysts and multiple subcentimeter hypodensities. At mid right kidney laterally there is a lobulated heterogeneously enhancing mass which measures 3.4 x 3.2 x 3.2 cm. There is no dilatation of the right renal vein identified. No hydronephrosis is noted in either kidney.   Bowel loops are nondilated. Appendix is not identified. No ascites, pneumoperitoneum or mesenteric inflammation is identified.   Aorta and iliac arteries are diffusely atherosclerotic with no focal aneurysm. Vena cava is normal in size with no gross evidence of thrombus. There are no pathologically enlarged retroperitoneal lymph nodes.   Urinary bladder is physiologically full with diffuse bladder wall mild thickening consistent with hypertrophy.   Prostate gland measures 3.9 x 3.7 x 5.0 cm.   Small right indirect inguinal hernia contains fat and a portion of small bowel loop. At left inguinal ring there is bulging of mesenteric fat and small bowel, borderline hernia.   Bilateral L5 spondylolysis is associated with grade 1 spondylolisthesis. The L5-S1 disc is severely narrowed and there is moderate to severe adjacent endplate degenerative change. Scoliosis is present with thoracic dextroconvex curve and mild lumbar levocurvature. No osseous metastatic lesions are noted.       Right renal mass measuring up to 3.4 cm is suspicious for renal carcinoma. No metastatic disease is noted.   Right innominate vein stent is occluded with collateral vessels through the chest and abdominal wall, greater on the right than the left with drainage through the azygous system.   Borderline to mild splenomegaly   4.1 cm aneurysmal dilatation of the ascending aorta     MACRO: Mony Garcia discussed the significance and urgency of this critical finding by epic secure chat with   HUSSEIN ROGERS on 10/12/2023 at 11:30am with confirmation reply received 11:55 am. (**-RCF-**) Findings:  See findings.   Signed by: Mony Garcia 10/12/2023 11:59 AM Dictation workstation:   [id number]    XR chest 2 views    Result Date: 10/11/2023  Interpreted By:  Shlomo Marinelli, STUDY: XR CHEST 2 VIEWS;  10/11/2023 6:43 pm   INDICATION: Signs/Symptoms:weakness.   COMPARISON: 02/16/2023   ACCESSION NUMBER(S): [id number]   ORDERING CLINICIAN: JOSE ART   FINDINGS: Heart size is enlarged. Vascular stent is similar. Tortuous thoracic aorta. No pneumonia or pulmonary edema. No pneumothorax. Probable changes of old healed granulomas disease       1.  No evidence of acute cardiopulmonary process.  Stable exam       MACRO: None   Signed by: Shlomo Marinelli 10/11/2023 7:23 PM Dictation workstation:   [id number]       Assessment/Plan   Assessment.  ESRD on HD MWF  FTT  Anemia of CKD  Renal mass 3.4 cm  Aneurysmal dilatation of AA 4.1 cm, w  Elevated troponin likely d/t CKD on HD  Depression    Plan  HD 10/13 to maintain MWF HD  Consult urology regarding renal mass  Consult psych for evaluation  Place in rehab with HD with the goal of helping pt regain strength    Ubaldo Vilchis

## 2023-10-12 NOTE — CARE PLAN
Problem: Nutrition  Goal: Less than 5 days NPO/clear liquids  Outcome: Progressing  Goal: Oral intake greater than 50%  Outcome: Progressing  Goal: Oral intake greater 75%  Outcome: Progressing  Goal: Consume prescribed supplement  Outcome: Progressing  Goal: Adequate PO fluid intake  Outcome: Progressing  Goal: Nutrition support goals are met within 48 hrs  Outcome: Progressing  Goal: Nutrition support is meeting 75% of nutrient needs  Outcome: Progressing  Goal: Tube feed tolerance  Outcome: Progressing  Goal: BG  mg/dL  Outcome: Progressing  Goal: Lab values WNL  Outcome: Progressing  Goal: Electrolytes WNL  Outcome: Progressing  Goal: Promote healing  Outcome: Progressing  Goal: Maintain stable weight  Outcome: Progressing  Goal: Reduce weight from edema/fluid  Outcome: Progressing  Goal: Gradual weight gain  Outcome: Progressing  Goal: Improve ostomy output  Outcome: Progressing     Problem: Pain - Adult  Goal: Verbalizes/displays adequate comfort level or baseline comfort level  Outcome: Progressing     Problem: Safety - Adult  Goal: Free from fall injury  Outcome: Progressing     Problem: Discharge Planning  Goal: Discharge to home or other facility with appropriate resources  Outcome: Progressing     Problem: Chronic Conditions and Co-morbidities  Goal: Patient's chronic conditions and co-morbidity symptoms are monitored and maintained or improved  Outcome: Progressing     Problem: Skin  Goal: Decreased wound size/increased tissue granulation at next dressing change  Outcome: Progressing  Goal: Participates in plan/prevention/treatment measures  Outcome: Progressing  Goal: Prevent/manage excess moisture  Outcome: Progressing  Goal: Prevent/minimize sheer/friction injuries  Outcome: Progressing  Goal: Promote/optimize nutrition  Outcome: Progressing  Goal: Promote skin healing  Outcome: Progressing     Problem: Fall/Injury  Goal: Verbalize understanding of personal risk factors for fall in the  hospital  Outcome: Progressing  Goal: Verbalize understanding of risk factor reduction measures to prevent injury from fall in the home  Outcome: Progressing  Goal: Use assistive devices by end of the shift  Outcome: Progressing  Goal: Pace activities to prevent fatigue by end of the shift  Outcome: Progressing     Problem: Fall/Injury  Goal: Not fall by end of shift  Outcome: Met  Goal: Be free from injury by end of the shift  Outcome: Met   The patient's goals for the shift include      The clinical goals for the shift include Pt to call for help utilizing call light appropriately throughout shift

## 2023-10-12 NOTE — CARE PLAN
Problem: OT Goals  Goal: OT Goal 1  Description: Pt will complete all bed mobility with independence safely    Outcome: Progressing  Goal: OT Goal 2  Description: Pt with complete all transfers safely with modified independence    Outcome: Progressing  Goal: OT Goal 3  Description: Pt will complete ADL's and mobility with good sit balance and fair+ stand balance    Outcome: Progressing  Goal: OT Goal 4  Description: Pt with complete grooming ADL's with modified independence and fair+ stand balance    Outcome: Progressing  Goal: OT Goal 5  Description: Pt will complete UB dressing ADL's independently.     Outcome: Progressing

## 2023-10-12 NOTE — PROGRESS NOTES
"Pt. Referred to social work this evening due to possible lack of resources with regard to food in his home. I called an spoke with this patient who was tearful throughout our conversation. He indicated he recently came home from a snf stay and since that time is finding it very difficult to prepare food for him self. He does not like to use Meals on Wheels because he does not like the food. He stated on occasion \"I don't know what I am going to do\" and was crying. He indicated he does not have a support system in the area. He is on medicare and a supplemental insurance and makes more money than the criteria for medicaid. He stated he does not know how to complete the paperwork for medicaid and was to get assistance with somebody from the dialysis center.   I asked him if he would be willing to call 211 and get information on local food pantries and he indicated he did not think he could that and began crying again.   The pt. Believes that he will staying in the hospital. I also asked him if he would like to go back to a snf or the likes and he stated no that he wanted to stay in his home. He is hoping that somebody could come into his home and cook for him.   We discussed senior adult services and he indicated he did not think he could call them and was still crying.   Of interest in his chart is a diagnosis for bipolar disorder and I am concerned he may be experiencing symptoms of this health issue which may be impeding his ability to care for self.   This was discussed with medical resident.     This patient's medical history was reviewed and is noted to indicate he may have dementia.       "

## 2023-10-12 NOTE — CONSULTS
Reason For Consult  Renal mass    History Of Present Illness  John W Nyhan is a 73 y.o. male with a history of ESRD on IHD M/W/F, CAD, nursing home resident, bipolar, chronic constipation currently admitted at [facility] for weakness, malnutrition, depression, anorexia.  A CT chest/abdomen/pelvis was obtained in search for possible malignancy to explain presentation and was noted to have a 3.5 cm renal mass.  Patient has no recollection of being told he was renal mass in the past, this is the first he is hearing of it.  Reports no gross hematuria.    No prior abdominal surgery    BMI 19.5    Social: Minimal family support, no siblings in [facility], no children, lives by himself    The following images were personally viewed and interpreted, results were discussed with the patient  CT A/P with IV: 3.2 cm right-sided renal mass, solid appearing.  Single phase study.  No hydroureteronephrosis.  Innumerable renal cysts bilaterally.  Bladder moderately distended.     Past Medical History  He has a past medical history of Bipolar disorder, unspecified (CMS/[facility]) and End stage renal disease (CMS/[facility]) (05/27/2015).    Surgical History  He has a past surgical history that includes Other surgical history (05/27/2015) and Tonsillectomy (05/27/2015).     Social History  He reports that he has never smoked. He has never used smokeless tobacco. No history on file for alcohol use and drug use.    Family History  No family history on file.     Allergies  Patient has no known allergies.    Review of Systems  Review of systems positive for anorexia, weight loss, failure to thrive, poor appetite     Physical Exam  Constitutional: in NAD, appears stated age, non-obese habitus, cachectic  Head: normocephalic, atraumatic  Eyes: Anicteric, moist conjunctivae   Neck: Trachea midline, no thyromegaly  Respiratory: normal effort, no use of accessory muscles  Cardiovascular: no edema noted, rrr  Skin: normal turgor, no rashes on examined skin,  "AV fistula seen in the left arm  Neurologic: grossly intact, oriented to person/place/time  Psychiatric: Flat affect, depressed mood, teary  MSK/extremities: no apparent limitations to range-of-motion, ambulatory, extremities non-edematous    Abdominal: non-distended, non-tender, no palpable masses, bladder non-palpable     Last Recorded Vitals  Blood pressure 132/76, pulse 93, temperature 36.7 °C (98.1 °F), temperature source Temporal, resp. rate 18, height 1.727 m (5' 8\"), weight 58.1 kg (128 lb), SpO2 100 %.    Relevant Results         Assessment/Plan   73-year-old male with ESRD on IHD currently admitted with failure to thrive, anorexia, depression    #Renal mass, right  -Small renal mass, relatively low likelihood of systemic symptoms from a small renal mass without any evidence of metastatic disease  -Regardless, imaging features concerning for malignancy  -CT kidney without contrast, with contrast triple phase for further characterization of the tumor  -Appreciate internal medicine comment on risk stratification for surgery  -Patient had heart cath 11/2022 and echo, EF normal, moderate to severe aortic calcification and stenosis  - -Treatment of choice would ideally be laparoscopic right-sided radical nephrectomy      Geremias Mane MD    "

## 2023-10-12 NOTE — CONSULTS
"Assessment Subjective/Objective:   Note Type: Initial Assessment   Reason for Assessment: nursing referral from [facility]  Note Authored by: Registered Dietitian Nutritionist   Pager Number: [email or web address]     Nutrition Note:  John W Nyhan is a 73 y.o. male presenting to ED from his dialysis office due to generalized weakness.  Pt was admitted here back in February of 2023 for generalized weakness and was discharged from that stay [facility].  He notes that he had been doing well at home until a few months ago.  He lives alone in an apartment without much support.  He gets dialysis at [facility] and has been on dialysis for >10 years.     Past Medical History   has a past medical history of Bipolar disorder, unspecified (CMS/[facility]) and End stage renal disease (CMS/[facility]) (05/27/2015).  Surgical History   has a past surgical history that includes Other surgical history (05/27/2015) and Tonsillectomy (05/27/2015).    Objective Information:    Pain: Pain Assessment: 0-10  Pain Score: 0 - No pain    Vitals: Blood pressure 132/76, pulse 93, temperature 36.7 °C (98.1 °F), temperature source Temporal, resp. rate 18, height 1.727 m (5' 8\"), weight 58.1 kg (128 lb), SpO2 100 %.    Height/Weight:  Height: 172.7cm   Weight (kg): 58.1kg   BMI (kg/m2): 19.5   Weight history/ % weight change:   Pt states his weight has been stable for the past few months at his dialysis center and that he had gained roughly 7# towards the beginning of the year       Recent Lab Results:  Lab Results   Component Value Date    GLUCOSE 102 (H) 10/11/2023    CALCIUM 10.4 (H) 10/11/2023     10/11/2023    K 3.3 (L) 10/11/2023    CO2 33 (H) 10/11/2023    CL 95 (L) 10/11/2023    BUN 29 (H) 10/11/2023    CREATININE 2.80 (H) 10/11/2023       Medications:  Scheduled medications  [START ON 10/13/2023] buPROPion SR, 200 mg, oral, Daily  calcium acetate, 667 mg, oral, TID with meals  divalproex, 250 mg, oral, Daily  heparin (porcine), 5,000 " Units, subcutaneous, q8h  polyethylene glycol, 17 g, oral, Daily  sennosides, 2 tablet, oral, BID  venlafaxine XR, 37.5 mg, oral, Daily with breakfast  venlafaxine XR, 75 mg, oral, Daily with breakfast      Nutrition Orders:  Dietary Orders (From admission, onward)       Start     Ordered    10/11/23 2341  May Participate in Room Service  Once        Question:  .  Answer:  Yes    10/11/23 2342    10/11/23 2221  Adult diet Renal; 60 mEq; 2 - 3 grams Sodium; Phosphorus 1 gm  Diet effective now        Question Answer Comment   Diet type Renal    Potassium restriction: 60 mEq    Sodium restriction: 2 - 3 grams Sodium    Phosphorus: Phosphorus 1 gm        10/11/23 2223                     Food/Nutrition Related History:  Energy Intake: 100%  Food/Nutrition Related History: Pt consumed 100% of breakfast this morning however notes that appetite prior to admission had been poor.  Pt has difficulty preparing foods for himself and does not like Meals-On-Wheels.  He tends to select more convenience foods like lunch meat sandwiches and drinking Boost.  He notes he has tried Novasource Renal as well.  He does not recall drinking Nepro but did get this on a previous admission.  He does not mind which supplement he gets as long as it is not vanilla flavored.   GI Symptoms:   none                   Oral Problems:  none   Mobility: difficulty with normal activity     Food Allergies: none   Nutritional Supplements:      Nutrition Focused Physical Findings:  Subcutaneous Fat Loss  Orbital Fat Pads: Mild-Moderate (slight dark circles and slight hollowing)  Buccal Fat Pads: Mild-Moderate (flat cheeks, minimal bounce)    Muscle Wasting  Temporalis: Mild-Moderate (slight depression)  Pectoralis (Clavicular Region): Mild-Moderate (some protrusion of clavicle)  Deltoid/Trapezius: Mild-Moderate (slight protrusion of acromion process)    Edema  Edema: none    Physical Findings (Nutrition Deficiency/Toxicity)  Hair: Negative  Eyes:  "Negative  Mouth: Negative  Nails: Negative  Skin: Negative (pale in appearance)    Estimated Needs:     Energy Needs:  Calculated Energy Needs Using Equations  Height: 172.7 cm (5' 8\")  Temp: 36.7 °C (98.1 °F)    Estimated Energy Needs  Total Energy Estimated Needs (kCal):  (6817-3105)  Total Estimated Energy Need per Day (kCal/kg):  (30-35)    Estimated Protein Needs  Total Protein Estimated Needs (g):  (70-87)  Total Protein Estimated Needs (g/kg):  (1.2-1.5)    Estimated Fluid Needs  Total Fluid Estimated Needs (mL):  (1ml/kcal or per MD recs)    Nutrition Diagnosis:   Moderate protein-calorie malnutrition related to acute on chronic nutritional stress as evidenced by increased nutrient needs with intakes meeting <75% of estimated needs for > 1 month and NFPE showing signs of moderate fat loss/muscle wasting.    Nutrition Goals:  Less than 5 days NPO/clear liquids, oral intake >50%, oral intake >75%, consumed provided oral supplement, adequate PO fluid intakes, nutrition support goals met within 48 hours, nutrition support meeting 75% of nutrient needs, tube feeding tolerance, Blood Glucose  mg/dl, lab values within normal limits, electrolytes within normal limits, promote healing, maintain stable wt, reduce weight from edema/fluid, gradual weight gain, improve ostomy output    Nutrition Goal Outcomes: Will assess progress towards goals upon follow-up.       Nutrition Therapy Interventions/Recommendations:   Recommendations:   Diet: pt to continue renal diet and start oral supplement  Oral Nutrition Supplements: recommend addition of Strawberry Nepro (420kcal, 19g pro) once daily at 10am and Chocolate Ensure Plus High Protein 8oz with dinner meal trays (350kcal, 20g pro)  Coordination of care: nursing  Nutrition Education:  denies need at this time     Dietitian Monitoring and Evaluation Plan:  Monitoring and Evaluation Plan: PO intake/tolerance, fluid intake/adequacy, weight trend, stool output, urine " output, skin healing/integrity, overall appearance, meal behavior, vitamin/mineral intake, digestive function, Nutrition Support tolerance/adequacy, labs     Follow Up  Time Spent (min): 45 minutes  Last Date of Nutrition Visit: 10/12/23  Nutrition Follow-Up Needed?: 3-5 days

## 2023-10-12 NOTE — PROGRESS NOTES
Physical Therapy    Physical Therapy Evaluation    Patient Name: John W Nyhan  MRN: [id number]  Today's Date: 10/12/2023   Time Calculation  Start Time: 1218  Stop Time: 1236  Time Calculation (min): 18 min    Assessment/Plan   PT Assessment: Pt demonstrates decreased strength and impaired balance/mboiltiy.  Based on current level of function, pt would benefit from continued skilled therapy while in the hospital to ensure safety, decrease risk of falls, and regains strength/mobility back to baseline.  Once stable enough for discharge, pt would benefit from low intensity therapy.     PT Assessment Results: Decreased strength, Decreased range of motion, Decreased mobility, Decreased safety awareness  Rehab Prognosis: Good  Evaluation/Treatment Tolerance: Patient tolerated treatment well, Patient limited by fatigue  Medical Staff Made Aware: Yes  End of Session Communication: Bedside nurse  End of Session Patient Position: Up in chair, Alarm on  IP OR SWING BED PT PLAN  Inpatient or Swing Bed: Inpatient  PT Plan  Treatment/Interventions: Bed mobility, Transfer training, Gait training, Stair training, Balance training, Neuromuscular re-education, Endurance training, Therapeutic exercise, Therapeutic activity  PT Plan: Skilled PT  PT Frequency: 3 times per week  PT Discharge Recommendations: Low intensity level of continued care  Equipment Recommended upon Discharge: Wheeled walker  PT Recommended Transfer Status: Contact guard    Subjective     Current Problem:  1. Weakness        2. Undernutrition        3. ESRD (end stage renal disease) (CMS/HCC)        4. Moderate episode of recurrent major depressive disorder (CMS/HCC)        5. At increased risk for social isolation        6. Financial difficulties        7. Elevated troponin          Past Medical History:   Diagnosis Date    Bipolar disorder, unspecified (CMS/[facility])     Bipolar disorder (manic depression)    End stage renal disease (CMS/HCC) 05/27/2015    ESRD (end  stage renal disease) on dialysis     Past Surgical History:   Procedure Laterality Date    OTHER SURGICAL HISTORY  05/27/2015    Creation Of AV Fistula - Nonautogenous Graft    TONSILLECTOMY  05/27/2015    Tonsillectomy With Adenoidectomy       General Visit Information:  Per EMR: pt sent by his nephrologist for possible placement for acute rehab. He was discharged from [facility] recently and prefers not to return. He states that he has been eating less than usual and cooking less although he made a turkey sandwich yesterday night but did not eat anything except a protein bar today. He denies recent weight loss. He enjoys his activities such as driving through a park but has not gone for several weeks due to fatigue. He has minimal family support has brothers in [place] and a sister in [place] but no siblings in [place], he has no children, lives by himself, He sleeps well. He has financial constraints and therefore does not use ub[employer] or other food delivery services. He has a psychiatrist and states that he has not seen him recently.   General: On arrival, pt supine in bed.  Pt in no apparent distress and agreeable to therapy.  Reason for Referral: impaired mobility  Referred By: Farnaz Booker  Family/Caregiver Present: No  Co-Treatment: OT  Prior to Session Communication: Bedside nurse  Patient Position Received: Bed, 3 rail up, Alarm on    Home Living/PLOF:  Pt lives alone in apt with 2 MARGIE.  States he has been expericning weakness for ~2 weeks since starting a new nutrition regimen.  Pt also states he feels weaker after dialysis.  Pt reports using SPC for mobility at most times.  Owns FWW.  Indep with ADLs.  Pt drives, shops, cooks, cleans.  Pt reports 1 fall ~ 3 weeks ago.     Precautions:  Precautions  Medical Precautions: Fall precautions     Objective     Pain:  Pain Assessment  Pain Assessment: 0-10  Pain Score: 0 - No pain    Cognition:  Cognition  Overall Cognitive Status: Within  Functional Limits    General Assessments:  Static Sitting Balance  Static Sitting-Comment/Number of Minutes: Good  Dynamic Sitting Balance  Dynamic Sitting-Comments: fair plus  Static Standing Balance  Static Standing-Comment/Number of Minutes: fair plus  Dynamic Standing Balance  Dynamic Standing-Comments: fair    Functional Assessments:  Bed Mobility   Supine to sit: CGS  Transfers  Sit to stand: CGA  Stand to sit: CGA  Ambulation/Gait Training  Pt ambulated 250ft with CGA and FWW without any LOB    Extremity/Trunk Assessments:  RLE  ROM: WFL  Strength  R hip flex: 4/5  R knee ext: 4/5  R DF: 4/5  LLE  ROM: WFL  Strength  L hip flex: 4/5  L knee ext: 4/5  L DF: 4/5    Outcome Measures:  [facility] Basic Mobility  Turning from your back to your side while in a flat bed without using bedrails: None  Moving from lying on your back to sitting on the side of a flat bed without using bedrails: None  Moving to and from bed to chair (including a wheelchair): None  Standing up from a chair using your arms (e.g. wheelchair or bedside chair): A little  To walk in hospital room: A little  Climbing 3-5 steps with railing: A little  Basic Mobility - Total Score: 21    Goals:  Encounter Problems       Encounter Problems (Active)       PT Problem       Pt will be able to perform all bed mobility tasks with Mod I.  (Progressing)       Start:  10/12/23    Expected End:  10/26/23            Pt will perform all transfers with Mod I and FWW with proper safety mechanics.   (Progressing)       Start:  10/12/23    Expected End:  10/26/23            Pt will ambulate 300ft with Mod I using FWW for improved functional independence.  (Progressing)       Start:  10/12/23    Expected End:  10/26/23            Pt will be able to negotiate 2 steps without HR with Mod I.  (Progressing)       Start:  10/12/23    Expected End:  10/26/23            Pt will demonstrate at least 4+/5 BLE strength for ease with functional mobility.   (Progressing)        Start:  10/12/23    Expected End:  10/26/23                              Education Documentation  Mobility Training, taught by Pina Villareal PT at 10/12/2023  4:01 PM.  Learner: Patient  Readiness: Acceptance  Method: Explanation  Response: Verbalizes Understanding, Demonstrated Understanding  Comment: pt was educated on importance of safe mobility    Education Comments  No comments found.

## 2023-10-13 ENCOUNTER — APPOINTMENT (OUTPATIENT)
Dept: DIALYSIS | Facility: HOSPITAL | Age: 73
End: 2023-10-13
Payer: MEDICARE

## 2023-10-13 ENCOUNTER — APPOINTMENT (OUTPATIENT)
Dept: RADIOLOGY | Facility: HOSPITAL | Age: 73
DRG: 640 | End: 2023-10-13
Payer: MEDICARE

## 2023-10-13 LAB
ALBUMIN SERPL BCP-MCNC: 3.9 G/DL (ref 3.4–5)
ANION GAP SERPL CALC-SCNC: 16 MMOL/L (ref 10–20)
BASOPHILS # BLD AUTO: 0.01 X10*3/UL (ref 0–0.1)
BASOPHILS NFR BLD AUTO: 0.2 %
BUN SERPL-MCNC: 54 MG/DL (ref 6–23)
CALCIUM SERPL-MCNC: 10.2 MG/DL (ref 8.6–10.3)
CHLORIDE SERPL-SCNC: 99 MMOL/L (ref 98–107)
CO2 SERPL-SCNC: 25 MMOL/L (ref 21–32)
CREAT SERPL-MCNC: 4.97 MG/DL (ref 0.5–1.3)
EOSINOPHIL # BLD AUTO: 0.11 X10*3/UL (ref 0–0.4)
EOSINOPHIL NFR BLD AUTO: 2.4 %
ERYTHROCYTE [DISTWIDTH] IN BLOOD BY AUTOMATED COUNT: 14.2 % (ref 11.5–14.5)
GFR SERPL CREATININE-BSD FRML MDRD: 12 ML/MIN/1.73M*2
GLUCOSE SERPL-MCNC: 83 MG/DL (ref 74–99)
HBV SURFACE AG SERPL QL IA: NONREACTIVE
HCT VFR BLD AUTO: 32.6 % (ref 41–52)
HGB BLD-MCNC: 10.2 G/DL (ref 13.5–17.5)
IMM GRANULOCYTES # BLD AUTO: 0.02 X10*3/UL (ref 0–0.5)
IMM GRANULOCYTES NFR BLD AUTO: 0.4 % (ref 0–0.9)
LYMPHOCYTES # BLD AUTO: 1.85 X10*3/UL (ref 0.8–3)
LYMPHOCYTES NFR BLD AUTO: 40.4 %
MCH RBC QN AUTO: 33.7 PG (ref 26–34)
MCHC RBC AUTO-ENTMCNC: 31.3 G/DL (ref 32–36)
MCV RBC AUTO: 108 FL (ref 80–100)
MONOCYTES # BLD AUTO: 0.46 X10*3/UL (ref 0.05–0.8)
MONOCYTES NFR BLD AUTO: 10 %
NEUTROPHILS # BLD AUTO: 2.13 X10*3/UL (ref 1.6–5.5)
NEUTROPHILS NFR BLD AUTO: 46.6 %
NRBC BLD-RTO: 0 /100 WBCS (ref 0–0)
PHOSPHATE SERPL-MCNC: 3.2 MG/DL (ref 2.5–4.9)
PLATELET # BLD AUTO: 72 X10*3/UL (ref 150–450)
PMV BLD AUTO: 11.6 FL (ref 7.5–11.5)
POTASSIUM SERPL-SCNC: 4.9 MMOL/L (ref 3.5–5.3)
RBC # BLD AUTO: 3.03 X10*6/UL (ref 4.5–5.9)
SODIUM SERPL-SCNC: 135 MMOL/L (ref 136–145)
WBC # BLD AUTO: 4.6 X10*3/UL (ref 4.4–11.3)

## 2023-10-13 PROCEDURE — 2500000001 HC RX 250 WO HCPCS SELF ADMINISTERED DRUGS (ALT 637 FOR MEDICARE OP): Performed by: INTERNAL MEDICINE

## 2023-10-13 PROCEDURE — 36415 COLL VENOUS BLD VENIPUNCTURE: CPT | Mod: STJLAB | Performed by: INTERNAL MEDICINE

## 2023-10-13 PROCEDURE — 2500000001 HC RX 250 WO HCPCS SELF ADMINISTERED DRUGS (ALT 637 FOR MEDICARE OP)

## 2023-10-13 PROCEDURE — 2500000001 HC RX 250 WO HCPCS SELF ADMINISTERED DRUGS (ALT 637 FOR MEDICARE OP): Performed by: STUDENT IN AN ORGANIZED HEALTH CARE EDUCATION/TRAINING PROGRAM

## 2023-10-13 PROCEDURE — 74170 CT ABD WO CNTRST FLWD CNTRST: CPT | Mod: MG

## 2023-10-13 PROCEDURE — 8010000001 HC DIALYSIS - HEMODIALYSIS PER DAY

## 2023-10-13 PROCEDURE — 2500000004 HC RX 250 GENERAL PHARMACY W/ HCPCS (ALT 636 FOR OP/ED): Performed by: STUDENT IN AN ORGANIZED HEALTH CARE EDUCATION/TRAINING PROGRAM

## 2023-10-13 PROCEDURE — 85025 COMPLETE CBC W/AUTO DIFF WBC: CPT

## 2023-10-13 PROCEDURE — 2500000004 HC RX 250 GENERAL PHARMACY W/ HCPCS (ALT 636 FOR OP/ED): Performed by: PSYCHIATRY & NEUROLOGY

## 2023-10-13 PROCEDURE — 96372 THER/PROPH/DIAG INJ SC/IM: CPT | Performed by: STUDENT IN AN ORGANIZED HEALTH CARE EDUCATION/TRAINING PROGRAM

## 2023-10-13 PROCEDURE — 5A1D70Z PERFORMANCE OF URINARY FILTRATION, INTERMITTENT, LESS THAN 6 HOURS PER DAY: ICD-10-PCS | Performed by: STUDENT IN AN ORGANIZED HEALTH CARE EDUCATION/TRAINING PROGRAM

## 2023-10-13 PROCEDURE — 99232 SBSQ HOSP IP/OBS MODERATE 35: CPT | Performed by: PSYCHIATRY & NEUROLOGY

## 2023-10-13 PROCEDURE — 36415 COLL VENOUS BLD VENIPUNCTURE: CPT | Performed by: INTERNAL MEDICINE

## 2023-10-13 PROCEDURE — 2500000001 HC RX 250 WO HCPCS SELF ADMINISTERED DRUGS (ALT 637 FOR MEDICARE OP): Performed by: EMERGENCY MEDICINE

## 2023-10-13 PROCEDURE — 2500000004 HC RX 250 GENERAL PHARMACY W/ HCPCS (ALT 636 FOR OP/ED)

## 2023-10-13 PROCEDURE — 86706 HEP B SURFACE ANTIBODY: CPT | Mod: STJLAB | Performed by: INTERNAL MEDICINE

## 2023-10-13 PROCEDURE — 84132 ASSAY OF SERUM POTASSIUM: CPT

## 2023-10-13 PROCEDURE — 99233 SBSQ HOSP IP/OBS HIGH 50: CPT | Performed by: STUDENT IN AN ORGANIZED HEALTH CARE EDUCATION/TRAINING PROGRAM

## 2023-10-13 PROCEDURE — 1100000001 HC PRIVATE ROOM DAILY

## 2023-10-13 PROCEDURE — 36415 COLL VENOUS BLD VENIPUNCTURE: CPT

## 2023-10-13 PROCEDURE — 2550000001 HC RX 255 CONTRASTS: Performed by: STUDENT IN AN ORGANIZED HEALTH CARE EDUCATION/TRAINING PROGRAM

## 2023-10-13 PROCEDURE — 86706 HEP B SURFACE ANTIBODY: CPT | Mod: CMCLAB | Performed by: INTERNAL MEDICINE

## 2023-10-13 RX ORDER — ACETAMINOPHEN 500 MG
5 TABLET ORAL NIGHTLY
Status: DISCONTINUED | OUTPATIENT
Start: 2023-10-13 | End: 2023-10-15 | Stop reason: HOSPADM

## 2023-10-13 RX ORDER — TRAZODONE HYDROCHLORIDE 50 MG/1
50 TABLET ORAL NIGHTLY
Status: DISCONTINUED | OUTPATIENT
Start: 2023-10-13 | End: 2023-10-15 | Stop reason: HOSPADM

## 2023-10-13 RX ORDER — ALPRAZOLAM 0.25 MG/1
0.25 TABLET ORAL 3 TIMES DAILY PRN
Status: DISCONTINUED | OUTPATIENT
Start: 2023-10-13 | End: 2023-10-15 | Stop reason: HOSPADM

## 2023-10-13 RX ADMIN — IOHEXOL 75 ML: 350 INJECTION, SOLUTION INTRAVENOUS at 13:07

## 2023-10-13 RX ADMIN — HEPARIN SODIUM 5000 UNITS: 5000 INJECTION INTRAVENOUS; SUBCUTANEOUS at 18:47

## 2023-10-13 RX ADMIN — HEPARIN SODIUM 5000 UNITS: 5000 INJECTION INTRAVENOUS; SUBCUTANEOUS at 10:30

## 2023-10-13 RX ADMIN — POLYETHYLENE GLYCOL 3350 17 G: 17 POWDER, FOR SOLUTION ORAL at 09:50

## 2023-10-13 RX ADMIN — SENNOSIDES 17.2 MG: 8.6 TABLET, FILM COATED ORAL at 09:51

## 2023-10-13 RX ADMIN — Medication 1 CAPSULE: at 09:50

## 2023-10-13 RX ADMIN — HEPARIN SODIUM 5000 UNITS: 5000 INJECTION INTRAVENOUS; SUBCUTANEOUS at 01:46

## 2023-10-13 RX ADMIN — CALCIUM ACETATE 667 MG: 667 CAPSULE ORAL at 19:56

## 2023-10-13 RX ADMIN — BUPROPION HYDROCHLORIDE 200 MG: 100 TABLET, FILM COATED, EXTENDED RELEASE ORAL at 09:55

## 2023-10-13 RX ADMIN — CALCIUM ACETATE 667 MG: 667 CAPSULE ORAL at 12:37

## 2023-10-13 RX ADMIN — SENNOSIDES 17.2 MG: 8.6 TABLET, FILM COATED ORAL at 21:14

## 2023-10-13 RX ADMIN — DIVALPROEX SODIUM 250 MG: 250 TABLET, EXTENDED RELEASE ORAL at 21:14

## 2023-10-13 RX ADMIN — VENLAFAXINE HYDROCHLORIDE 150 MG: 75 CAPSULE, EXTENDED RELEASE ORAL at 09:50

## 2023-10-13 ASSESSMENT — COGNITIVE AND FUNCTIONAL STATUS - GENERAL
TOILETING: A LITTLE
MOVING TO AND FROM BED TO CHAIR: A LITTLE
PERSONAL GROOMING: A LITTLE
HELP NEEDED FOR BATHING: A LITTLE
WALKING IN HOSPITAL ROOM: A LITTLE
STANDING UP FROM CHAIR USING ARMS: A LITTLE
DRESSING REGULAR LOWER BODY CLOTHING: A LITTLE
WALKING IN HOSPITAL ROOM: A LITTLE
STANDING UP FROM CHAIR USING ARMS: A LITTLE
MOBILITY SCORE: 20
CLIMB 3 TO 5 STEPS WITH RAILING: TOTAL
CLIMB 3 TO 5 STEPS WITH RAILING: A LITTLE
MOVING TO AND FROM BED TO CHAIR: A LITTLE
MOBILITY SCORE: 18
DAILY ACTIVITIY SCORE: 20

## 2023-10-13 ASSESSMENT — PAIN SCALES - GENERAL
PAINLEVEL_OUTOF10: 0 - NO PAIN
PAINLEVEL_OUTOF10: 0 - NO PAIN

## 2023-10-13 ASSESSMENT — PAIN - FUNCTIONAL ASSESSMENT: PAIN_FUNCTIONAL_ASSESSMENT: NO/DENIES PAIN

## 2023-10-13 NOTE — PROGRESS NOTES
John W Nyhan is a 73 y.o. male on day 2 of admission presenting with Weakness.      Subjective   Patient appeared tearful during examination. States he is worried about how he should move forward clinically and what his plan should be. He has been in distress throughout the night and was noted to have a hypertensive episode during one of his more stressful crying spells. He responds positively to verbal support and reassurance.        Objective     Last Recorded Vitals  BP (!) 168/98 (BP Location: Right arm)   Pulse 86   Temp 36.1 °C (97 °F)   Resp 18   Wt 58.1 kg (128 lb)   SpO2 97%   Intake/Output last 3 Shifts:  No intake or output data in the 24 hours ending 10/13/23 1510    Admission Weight  Weight: 58.1 kg (128 lb) (10/11/23 1606)    Daily Weight  10/11/23 : 58.1 kg (128 lb)    Image Results  CT chest abdomen pelvis w IV contrast  Narrative: Interpreted By:  Mony Garcia,   STUDY:  CT CHEST ABDOMEN PELVIS W IV CONTRAST; ;  10/12/2023 10:47 am      INDICATION:  Signs/Symptoms:Unintentional weight loss, weakness.      COMPARISON:  None.      ACCESSION NUMBER(S):  [id number]      ORDERING CLINICIAN:  HUSSEIN ROGERS      TECHNIQUE:  Imaging was performed from thoracic apex through ischial tuberosities  with axial, coronal and sagittal images reconstructed. Patient  received 75 mL Omnipaque 350 intravenously.      FINDINGS:  Trachea and mainstem bronchi are patent with no endoluminal mass. No  pneumonia or pleural effusion is noted. There is no suspicious  pulmonary nodule or mass.      Thyroid gland is nonenlarged. Densely calcified right paratracheal  and right hilar lymph nodes are noted. There are no enlarged  noncalcified mediastinal or hilar lymph nodes.      A right innominate vein stent is present which has no luminal  opacification indicating occlusion. The right internal jugular vein  is occluded just proximal to the stent. Left innominate vein is  patent and opacifies the superior vena cava at  the terminus of the  right-sided stent at the junction with the vena cava. Vena cava is  opacify into the right atrium.      There are diffuse collateral opacify vessels throughout the chest  wall, larger on the right than on the left. These collateral vessels  extend into the abdomen. Collateral venous drainage extends through  paravertebral veins into the azygous system and the azygous vein is  dilated from collateral drainage into the vena cava.      Descending aorta is tortuous but nondilated. Ascending aorta has  fusiform dilatation measuring up to 4.1 cm. There is no significant  atherosclerotic calcification.      Diffuse coronary calcifications are present. The heart is nonenlarged  but the left ventricle shows concentric hypertrophy. No pericardial  effusion is present.      No mass is noted in the liver. There is no intra or extrahepatic  biliary dilatation. Gallbladder is normal in appearance.      No mass or inflammation is noted involving the pancreas.      Spleen is borderline to mildly enlarged measuring 12.7 x 13.1 x 4.9  cm. A few subcapsular hypodensities are noted in the spleen measuring  1 cm or less, nonspecific. A few scattered splenic calcifications are  present..      Adrenal glands appear normal.      Kidneys are atrophic bilaterally with multiple cysts and multiple  subcentimeter hypodensities. At mid right kidney laterally there is a  lobulated heterogeneously enhancing mass which measures 3.4 x 3.2 x  3.2 cm. There is no dilatation of the right renal vein identified. No  hydronephrosis is noted in either kidney.      Bowel loops are nondilated. Appendix is not identified. No ascites,  pneumoperitoneum or mesenteric inflammation is identified.      Aorta and iliac arteries are diffusely atherosclerotic with no focal  aneurysm. Vena cava is normal in size with no gross evidence of  thrombus. There are no pathologically enlarged retroperitoneal lymph  nodes.      Urinary bladder is  physiologically full with diffuse bladder wall  mild thickening consistent with hypertrophy.      Prostate gland measures 3.9 x 3.7 x 5.0 cm.      Small right indirect inguinal hernia contains fat and a portion of  small bowel loop. At left inguinal ring there is bulging of  mesenteric fat and small bowel, borderline hernia.      Bilateral L5 spondylolysis is associated with grade 1  spondylolisthesis. The L5-S1 disc is severely narrowed and there is  moderate to severe adjacent endplate degenerative change. Scoliosis  is present with thoracic dextroconvex curve and mild lumbar  levocurvature. No osseous metastatic lesions are noted.      Impression: Right renal mass measuring up to 3.4 cm is suspicious for renal  carcinoma. No metastatic disease is noted.      Right innominate vein stent is occluded with collateral vessels  through the chest and abdominal wall, greater on the right than the  left with drainage through the azygous system.      Borderline to mild splenomegaly      4.1 cm aneurysmal dilatation of the ascending aorta          MACRO:  Mony Garcia discussed the significance and urgency of this  critical finding by epic secure chat with  HUSSEIN ROGERS on  10/12/2023 at 11:30am with confirmation reply received 11:55 am.  (**-RCF-**) Findings:  See findings.      Signed by: Mony Garcia 10/12/2023 11:59 AM  Dictation workstation:   [id number]      Physical Exam  General: Alert and oriented x 3  Head: Normocephalic  ENT: MMM  Cardiovascular: rate and rhythm normal  Pulmonary: CTAB, no wheezes, rales, or rhonchi  Abdomen: No guarding, no masses  MSK: AVF on LUE, thin  Neuro: Alert & oriented x3, is able to carry a conversation.   Psych: crying mid-conversation, appears sad, is not hopeful   Relevant Results      No current facility-administered medications on file prior to encounter.     Current Outpatient Medications on File Prior to Encounter   Medication Sig Dispense Refill    B complex-vitamin  C-folic acid (Nephro-Kobi) 0.8 mg tablet Take 1 tablet by mouth once daily.      buPROPion SR (Wellbutrin SR) 200 mg 12 hr tablet Take 1 tablet (200 mg) by mouth once daily. Do not crush, chew, or split.      calcium acetate (Phoslo) 667 mg capsule Take 1 capsule (667 mg) by mouth 3 times a day with meals.      divalproex (Depakote ER) 250 mg 24 hr tablet Take 1 tablet (250 mg) by mouth once daily at bedtime. Do not crush, chew, or split.      midodrine (Proamatine) 10 mg tablet Take 1 tablet (10 mg) by mouth once daily as needed (for low BP).      venlafaxine (Effexor) 37.5 mg tablet Take 1 tablet (37.5 mg) by mouth once daily. With 75mg capsule      venlafaxine XR (Effexor-XR) 75 mg 24 hr capsule Take 1 capsule (75 mg) by mouth once daily. With 37.5mg tablet        Results for orders placed or performed during the hospital encounter of 10/11/23 (from the past 24 hour(s))   CBC and Auto Differential   Result Value Ref Range    WBC 4.6 4.4 - 11.3 x10*3/uL    nRBC 0.0 0.0 - 0.0 /100 WBCs    RBC 3.03 (L) 4.50 - 5.90 x10*6/uL    Hemoglobin 10.2 (L) 13.5 - 17.5 g/dL    Hematocrit 32.6 (L) 41.0 - 52.0 %     (H) 80 - 100 fL    MCH 33.7 26.0 - 34.0 pg    MCHC 31.3 (L) 32.0 - 36.0 g/dL    RDW 14.2 11.5 - 14.5 %    Platelets 72 (L) 150 - 450 x10*3/uL    MPV 11.6 (H) 7.5 - 11.5 fL    Neutrophils % 46.6 40.0 - 80.0 %    Immature Granulocytes %, Automated 0.4 0.0 - 0.9 %    Lymphocytes % 40.4 13.0 - 44.0 %    Monocytes % 10.0 2.0 - 10.0 %    Eosinophils % 2.4 0.0 - 6.0 %    Basophils % 0.2 0.0 - 2.0 %    Neutrophils Absolute 2.13 1.60 - 5.50 x10*3/uL    Immature Granulocytes Absolute, Automated 0.02 0.00 - 0.50 x10*3/uL    Lymphocytes Absolute 1.85 0.80 - 3.00 x10*3/uL    Monocytes Absolute 0.46 0.05 - 0.80 x10*3/uL    Eosinophils Absolute 0.11 0.00 - 0.40 x10*3/uL    Basophils Absolute 0.01 0.00 - 0.10 x10*3/uL   Renal function panel   Result Value Ref Range    Glucose 83 74 - 99 mg/dL    Sodium 135 (L) 136 - 145 mmol/L     Potassium 4.9 3.5 - 5.3 mmol/L    Chloride 99 98 - 107 mmol/L    Bicarbonate 25 21 - 32 mmol/L    Anion Gap 16 10 - 20 mmol/L    Urea Nitrogen 54 (H) 6 - 23 mg/dL    Creatinine 4.97 (H) 0.50 - 1.30 mg/dL    eGFR 12 (L) >60 mL/min/1.73m*2    Calcium 10.2 8.6 - 10.3 mg/dL    Phosphorus 3.2 2.5 - 4.9 mg/dL    Albumin 3.9 3.4 - 5.0 g/dL    CT chest abdomen pelvis w IV contrast    Result Date: 10/12/2023  Interpreted By:  Mony Garcia, STUDY: CT CHEST ABDOMEN PELVIS W IV CONTRAST; ;  10/12/2023 10:47 am   INDICATION: Signs/Symptoms:Unintentional weight loss, weakness.   COMPARISON: None.   ACCESSION NUMBER(S): [id number]   ORDERING CLINICIAN: HUSSEIN ROGERS   TECHNIQUE: Imaging was performed from thoracic apex through ischial tuberosities with axial, coronal and sagittal images reconstructed. Patient received 75 mL Omnipaque 350 intravenously.   FINDINGS: Trachea and mainstem bronchi are patent with no endoluminal mass. No pneumonia or pleural effusion is noted. There is no suspicious pulmonary nodule or mass.   Thyroid gland is nonenlarged. Densely calcified right paratracheal and right hilar lymph nodes are noted. There are no enlarged noncalcified mediastinal or hilar lymph nodes.   A right innominate vein stent is present which has no luminal opacification indicating occlusion. The right internal jugular vein is occluded just proximal to the stent. Left innominate vein is patent and opacifies the superior vena cava at the terminus of the right-sided stent at the junction with the vena cava. Vena cava is opacify into the right atrium.   There are diffuse collateral opacify vessels throughout the chest wall, larger on the right than on the left. These collateral vessels extend into the abdomen. Collateral venous drainage extends through paravertebral veins into the azygous system and the azygous vein is dilated from collateral drainage into the vena cava.   Descending aorta is tortuous but nondilated. Ascending  aorta has fusiform dilatation measuring up to 4.1 cm. There is no significant atherosclerotic calcification.   Diffuse coronary calcifications are present. The heart is nonenlarged but the left ventricle shows concentric hypertrophy. No pericardial effusion is present.   No mass is noted in the liver. There is no intra or extrahepatic biliary dilatation. Gallbladder is normal in appearance.   No mass or inflammation is noted involving the pancreas.   Spleen is borderline to mildly enlarged measuring 12.7 x 13.1 x 4.9 cm. A few subcapsular hypodensities are noted in the spleen measuring 1 cm or less, nonspecific. A few scattered splenic calcifications are present..   Adrenal glands appear normal.   Kidneys are atrophic bilaterally with multiple cysts and multiple subcentimeter hypodensities. At mid right kidney laterally there is a lobulated heterogeneously enhancing mass which measures 3.4 x 3.2 x 3.2 cm. There is no dilatation of the right renal vein identified. No hydronephrosis is noted in either kidney.   Bowel loops are nondilated. Appendix is not identified. No ascites, pneumoperitoneum or mesenteric inflammation is identified.   Aorta and iliac arteries are diffusely atherosclerotic with no focal aneurysm. Vena cava is normal in size with no gross evidence of thrombus. There are no pathologically enlarged retroperitoneal lymph nodes.   Urinary bladder is physiologically full with diffuse bladder wall mild thickening consistent with hypertrophy.   Prostate gland measures 3.9 x 3.7 x 5.0 cm.   Small right indirect inguinal hernia contains fat and a portion of small bowel loop. At left inguinal ring there is bulging of mesenteric fat and small bowel, borderline hernia.   Bilateral L5 spondylolysis is associated with grade 1 spondylolisthesis. The L5-S1 disc is severely narrowed and there is moderate to severe adjacent endplate degenerative change. Scoliosis is present with thoracic dextroconvex curve and mild  lumbar levocurvature. No osseous metastatic lesions are noted.       Right renal mass measuring up to 3.4 cm is suspicious for renal carcinoma. No metastatic disease is noted.   Right innominate vein stent is occluded with collateral vessels through the chest and abdominal wall, greater on the right than the left with drainage through the azygous system.   Borderline to mild splenomegaly   4.1 cm aneurysmal dilatation of the ascending aorta     MACRO: Mony Garcia discussed the significance and urgency of this critical finding by epic secure chat with  HUSSEIN ROGERS on 10/12/2023 at 11:30am with confirmation reply received 11:55 am. (**-RCF-**) Findings:  See findings.   Signed by: Mony Garcia 10/12/2023 11:59 AM Dictation workstation:   [id number]    XR chest 2 views    Result Date: 10/11/2023  Interpreted By:  Shlomo Marinelli, STUDY: XR CHEST 2 VIEWS;  10/11/2023 6:43 pm   INDICATION: Signs/Symptoms:weakness.   COMPARISON: 02/16/2023   ACCESSION NUMBER(S): [id number]   ORDERING CLINICIAN: JOSE ART   FINDINGS: Heart size is enlarged. Vascular stent is similar. Tortuous thoracic aorta. No pneumonia or pulmonary edema. No pneumothorax. Probable changes of old healed granulomas disease       1.  No evidence of acute cardiopulmonary process.  Stable exam       MACRO: None   Signed by: Shlomo Marinelli 10/11/2023 7:23 PM Dictation workstation:   [id number]             Assessment/Plan      Assessment.  ESRD on HD MWF  FTT  Anemia of CKD  Renal mass 3.4 cm  Aneurysmal dilatation of AA 4.1 cm  Elevated troponin likely d/t CKD on HD  Depression     Plan  HD 10/13 to maintain MWF HD  Urology indicated ideal treatment is laparoscopic right-sided radical nephrectomy   Internal Medicine commented on risk stratification for surgery  Psych evaluated patient and started Bupropion   Hospice meeting 10/14  Place in rehab with HD with the goal of helping pt regain strength           Ubaldo Vilchis

## 2023-10-13 NOTE — CARE PLAN
Problem: Nutrition  Goal: Less than 5 days NPO/clear liquids  Outcome: Progressing  Goal: Oral intake greater than 50%  Outcome: Progressing  Goal: Oral intake greater 75%  Outcome: Progressing  Goal: Consume prescribed supplement  Outcome: Progressing  Goal: Adequate PO fluid intake  Outcome: Progressing  Goal: Nutrition support goals are met within 48 hrs  Outcome: Progressing  Goal: Nutrition support is meeting 75% of nutrient needs  Outcome: Progressing  Goal: Tube feed tolerance  Outcome: Progressing  Goal: BG  mg/dL  Outcome: Progressing  Goal: Lab values WNL  Outcome: Progressing  Goal: Electrolytes WNL  Outcome: Progressing  Goal: Promote healing  Outcome: Progressing  Goal: Maintain stable weight  Outcome: Progressing  Goal: Reduce weight from edema/fluid  Outcome: Progressing  Goal: Gradual weight gain  Outcome: Progressing  Goal: Improve ostomy output  Outcome: Progressing     Problem: Pain - Adult  Goal: Verbalizes/displays adequate comfort level or baseline comfort level  Outcome: Progressing     Problem: Safety - Adult  Goal: Free from fall injury  Outcome: Progressing     Problem: Discharge Planning  Goal: Discharge to home or other facility with appropriate resources  Outcome: Progressing     Problem: Chronic Conditions and Co-morbidities  Goal: Patient's chronic conditions and co-morbidity symptoms are monitored and maintained or improved  Outcome: Progressing     Problem: Skin  Goal: Decreased wound size/increased tissue granulation at next dressing change  Outcome: Progressing  Goal: Participates in plan/prevention/treatment measures  Outcome: Progressing  Goal: Prevent/manage excess moisture  Outcome: Progressing  Goal: Prevent/minimize sheer/friction injuries  Outcome: Progressing  Goal: Promote/optimize nutrition  Outcome: Progressing  Goal: Promote skin healing  Outcome: Progressing     Problem: Fall/Injury  Goal: Verbalize understanding of personal risk factors for fall in the  hospital  Outcome: Progressing  Goal: Verbalize understanding of risk factor reduction measures to prevent injury from fall in the home  Outcome: Progressing  Goal: Use assistive devices by end of the shift  Outcome: Progressing  Goal: Pace activities to prevent fatigue by end of the shift  Outcome: Progressing   The patient's goals for the shift include      The clinical goals for the shift include Patient w2ill demonstrate appropriate use of call light for assistance through end of shift.    .

## 2023-10-13 NOTE — PROGRESS NOTES
John W Nyhan is a 73 y.o. male on day 2 of admission presenting with Weakness.      Subjective   Interval events reviewed       Objective     Last Recorded Vitals  BP (!) 168/98 (BP Location: Right arm)   Pulse 86   Temp 36.1 °C (97 °F)   Resp 18   Wt 58.1 kg (128 lb)   SpO2 97%   Intake/Output last 3 Shifts:    Intake/Output Summary (Last 24 hours) at 10/13/2023 1345  Last data filed at 10/12/2023 1355  Gross per 24 hour   Intake 240 ml   Output --   Net 240 ml       Admission Weight  Weight: 58.1 kg (128 lb) (10/11/23 1606)    Daily Weight  10/11/23 : 58.1 kg (128 lb)    Image Results  CT chest abdomen pelvis w IV contrast  Narrative: Interpreted By:  Mony Garcia,   STUDY:  CT CHEST ABDOMEN PELVIS W IV CONTRAST; ;  10/12/2023 10:47 am      INDICATION:  Signs/Symptoms:Unintentional weight loss, weakness.      COMPARISON:  None.      ACCESSION NUMBER(S):  [id number]      ORDERING CLINICIAN:  HUSSEIN ROGERS      TECHNIQUE:  Imaging was performed from thoracic apex through ischial tuberosities  with axial, coronal and sagittal images reconstructed. Patient  received 75 mL Omnipaque 350 intravenously.      FINDINGS:  Trachea and mainstem bronchi are patent with no endoluminal mass. No  pneumonia or pleural effusion is noted. There is no suspicious  pulmonary nodule or mass.      Thyroid gland is nonenlarged. Densely calcified right paratracheal  and right hilar lymph nodes are noted. There are no enlarged  noncalcified mediastinal or hilar lymph nodes.      A right innominate vein stent is present which has no luminal  opacification indicating occlusion. The right internal jugular vein  is occluded just proximal to the stent. Left innominate vein is  patent and opacifies the superior vena cava at the terminus of the  right-sided stent at the junction with the vena cava. Vena cava is  opacify into the right atrium.      There are diffuse collateral opacify vessels throughout the chest  wall, larger on the  right than on the left. These collateral vessels  extend into the abdomen. Collateral venous drainage extends through  paravertebral veins into the azygous system and the azygous vein is  dilated from collateral drainage into the vena cava.      Descending aorta is tortuous but nondilated. Ascending aorta has  fusiform dilatation measuring up to 4.1 cm. There is no significant  atherosclerotic calcification.      Diffuse coronary calcifications are present. The heart is nonenlarged  but the left ventricle shows concentric hypertrophy. No pericardial  effusion is present.      No mass is noted in the liver. There is no intra or extrahepatic  biliary dilatation. Gallbladder is normal in appearance.      No mass or inflammation is noted involving the pancreas.      Spleen is borderline to mildly enlarged measuring 12.7 x 13.1 x 4.9  cm. A few subcapsular hypodensities are noted in the spleen measuring  1 cm or less, nonspecific. A few scattered splenic calcifications are  present..      Adrenal glands appear normal.      Kidneys are atrophic bilaterally with multiple cysts and multiple  subcentimeter hypodensities. At mid right kidney laterally there is a  lobulated heterogeneously enhancing mass which measures 3.4 x 3.2 x  3.2 cm. There is no dilatation of the right renal vein identified. No  hydronephrosis is noted in either kidney.      Bowel loops are nondilated. Appendix is not identified. No ascites,  pneumoperitoneum or mesenteric inflammation is identified.      Aorta and iliac arteries are diffusely atherosclerotic with no focal  aneurysm. Vena cava is normal in size with no gross evidence of  thrombus. There are no pathologically enlarged retroperitoneal lymph  nodes.      Urinary bladder is physiologically full with diffuse bladder wall  mild thickening consistent with hypertrophy.      Prostate gland measures 3.9 x 3.7 x 5.0 cm.      Small right indirect inguinal hernia contains fat and a portion of  small  bowel loop. At left inguinal ring there is bulging of  mesenteric fat and small bowel, borderline hernia.      Bilateral L5 spondylolysis is associated with grade 1  spondylolisthesis. The L5-S1 disc is severely narrowed and there is  moderate to severe adjacent endplate degenerative change. Scoliosis  is present with thoracic dextroconvex curve and mild lumbar  levocurvature. No osseous metastatic lesions are noted.      Impression: Right renal mass measuring up to 3.4 cm is suspicious for renal  carcinoma. No metastatic disease is noted.      Right innominate vein stent is occluded with collateral vessels  through the chest and abdominal wall, greater on the right than the  left with drainage through the azygous system.      Borderline to mild splenomegaly      4.1 cm aneurysmal dilatation of the ascending aorta          MACRO:  Mony Garcia discussed the significance and urgency of this  critical finding by epic secure chat with  HUSSEIN ROGERS on  10/12/2023 at 11:30am with confirmation reply received 11:55 am.  (**-RCF-**) Findings:  See findings.      Signed by: Mony Garcia 10/12/2023 11:59 AM  Dictation workstation:   [id number]      Physical Exam    Neck: supple  Lung: no wheez  Ecv: [id number]  exT: no c/c    Relevant Results      Results for orders placed or performed during the hospital encounter of 10/11/23 (from the past 24 hour(s))   CBC and Auto Differential   Result Value Ref Range    WBC 4.6 4.4 - 11.3 x10*3/uL    nRBC 0.0 0.0 - 0.0 /100 WBCs    RBC 3.03 (L) 4.50 - 5.90 x10*6/uL    Hemoglobin 10.2 (L) 13.5 - 17.5 g/dL    Hematocrit 32.6 (L) 41.0 - 52.0 %     (H) 80 - 100 fL    MCH 33.7 26.0 - 34.0 pg    MCHC 31.3 (L) 32.0 - 36.0 g/dL    RDW 14.2 11.5 - 14.5 %    Platelets 72 (L) 150 - 450 x10*3/uL    MPV 11.6 (H) 7.5 - 11.5 fL    Neutrophils % 46.6 40.0 - 80.0 %    Immature Granulocytes %, Automated 0.4 0.0 - 0.9 %    Lymphocytes % 40.4 13.0 - 44.0 %    Monocytes % 10.0 2.0 - 10.0 %     Eosinophils % 2.4 0.0 - 6.0 %    Basophils % 0.2 0.0 - 2.0 %    Neutrophils Absolute 2.13 1.60 - 5.50 x10*3/uL    Immature Granulocytes Absolute, Automated 0.02 0.00 - 0.50 x10*3/uL    Lymphocytes Absolute 1.85 0.80 - 3.00 x10*3/uL    Monocytes Absolute 0.46 0.05 - 0.80 x10*3/uL    Eosinophils Absolute 0.11 0.00 - 0.40 x10*3/uL    Basophils Absolute 0.01 0.00 - 0.10 x10*3/uL   Renal function panel   Result Value Ref Range    Glucose 83 74 - 99 mg/dL    Sodium 135 (L) 136 - 145 mmol/L    Potassium 4.9 3.5 - 5.3 mmol/L    Chloride 99 98 - 107 mmol/L    Bicarbonate 25 21 - 32 mmol/L    Anion Gap 16 10 - 20 mmol/L    Urea Nitrogen 54 (H) 6 - 23 mg/dL    Creatinine 4.97 (H) 0.50 - 1.30 mg/dL    eGFR 12 (L) >60 mL/min/1.73m*2    Calcium 10.2 8.6 - 10.3 mg/dL    Phosphorus 3.2 2.5 - 4.9 mg/dL    Albumin 3.9 3.4 - 5.0 g/dL                Assessment/Plan          Assessment  ESRD on HD MWF  Failure to thrive/weight loss  Renal mass   Depression  Anemia of ckd  HTN     Plan   Maintain on Hd MWF  Appreciate psych input  Appreciate urology input regarding renal mass  F/u hospice meeting outcome       Tank Weems MD

## 2023-10-13 NOTE — PROGRESS NOTES
"John W Nyhan is a 73 y.o. male on day 2 of admission presenting with Weakness.    SUBJECTIVE:  Pt seen talking to pastor richard. Pt report that he was informed that the kidney mass could be cancer and he does not know how to move forward. Pt feel overwhelmed and anxious  Pt feel sad and anxious but not suicidal  Helpless feeling  No AH or VH  Poor sleep last night    Exam:  Vital Signs: /86 (BP Location: Right arm, Patient Position: Lying)   Pulse 84   Temp 36.1 °C (97 °F) (Temporal)   Resp 19   Ht 1.727 m (5' 8\")   Wt 58.1 kg (128 lb)   SpO2 97%   BMI 19.46 kg/m²   Musculoskeletal:  normal  Gait/Station:  normal    Mental Status Exam  General: good eye contact  Appearance: normal built  Attitude: cooperative  Behavior: normal  Motor Activity: decreased  Speech: low volume  Mood: depressed 5/10  Affect: restricted  Thought Process: normal  Thought Content: helpless, not suicidal, no access to guns or weapons  Thought Perception: normal  Cognition: normal  Insight: normal  Judgement: normal       Results for orders placed or performed during the hospital encounter of 10/11/23 (from the past 96 hour(s))   Sars-CoV-2 PCR, Symptomatic   Result Value Ref Range    Coronavirus 2019, PCR Not Detected Not Detected   Influenza A, and B PCR   Result Value Ref Range    Flu A Result Not Detected Not Detected    Flu B Result Not Detected Not Detected   CBC and Auto Differential   Result Value Ref Range    WBC 8.6 4.4 - 11.3 x10*3/uL    nRBC 0.0 0.0 - 0.0 /100 WBCs    RBC 3.30 (L) 4.50 - 5.90 x10*6/uL    Hemoglobin 11.0 (L) 13.5 - 17.5 g/dL    Hematocrit 33.7 (L) 41.0 - 52.0 %     (H) 80 - 100 fL    MCH 33.3 26.0 - 34.0 pg    MCHC 32.6 32.0 - 36.0 g/dL    RDW 14.0 11.5 - 14.5 %    Platelets 113 (L) 150 - 450 x10*3/uL    MPV 10.5 7.5 - 11.5 fL    Neutrophils % 42.6 40.0 - 80.0 %    Immature Granulocytes %, Automated 0.2 0.0 - 0.9 %    Lymphocytes % 46.0 13.0 - 44.0 %    Monocytes % 9.4 2.0 - 10.0 %    " Eosinophils % 1.6 0.0 - 6.0 %    Basophils % 0.2 0.0 - 2.0 %    Neutrophils Absolute 3.65 1.60 - 5.50 x10*3/uL    Immature Granulocytes Absolute, Automated 0.02 0.00 - 0.50 x10*3/uL    Lymphocytes Absolute 3.95 (H) 0.80 - 3.00 x10*3/uL    Monocytes Absolute 0.81 (H) 0.05 - 0.80 x10*3/uL    Eosinophils Absolute 0.14 0.00 - 0.40 x10*3/uL    Basophils Absolute 0.02 0.00 - 0.10 x10*3/uL   Comprehensive Metabolic Panel   Result Value Ref Range    Glucose 102 (H) 74 - 99 mg/dL    Sodium 141 136 - 145 mmol/L    Potassium 3.3 (L) 3.5 - 5.3 mmol/L    Chloride 95 (L) 98 - 107 mmol/L    Bicarbonate 33 (H) 21 - 32 mmol/L    Anion Gap 16 10 - 20 mmol/L    Urea Nitrogen 29 (H) 6 - 23 mg/dL    Creatinine 2.80 (H) 0.50 - 1.30 mg/dL    eGFR 23 (L) >60 mL/min/1.73m*2    Calcium 10.4 (H) 8.6 - 10.3 mg/dL    Albumin 4.2 3.4 - 5.0 g/dL    Alkaline Phosphatase 100 33 - 136 U/L    Total Protein 6.6 6.4 - 8.2 g/dL    AST 22 9 - 39 U/L    Bilirubin, Total 1.0 0.0 - 1.2 mg/dL    ALT 21 10 - 52 U/L   Magnesium   Result Value Ref Range    Magnesium 2.17 1.60 - 2.40 mg/dL   Troponin I, High Sensitivity, Initial   Result Value Ref Range    Troponin I, High Sensitivity 123 (HH) 0 - 20 ng/L   Urinalysis with Reflex Microscopic   Result Value Ref Range    Color, Urine Yellow Straw, Yellow    Appearance, Urine Clear Clear    Specific Gravity, Urine 1.004 (N) 1.005 - 1.035    pH, Urine 9.0 (N) 5.0, 5.5, 6.0, 6.5, 7.0, 7.5, 8.0    Protein, Urine 100 (2+) (N) NEGATIVE mg/dL    Glucose, Urine 50 (1+) (A) NEGATIVE mg/dL    Blood, Urine NEGATIVE NEGATIVE    Ketones, Urine NEGATIVE NEGATIVE mg/dL    Bilirubin, Urine NEGATIVE NEGATIVE    Urobilinogen, Urine <2.0 <2.0 mg/dL    Nitrite, Urine NEGATIVE NEGATIVE    Leukocyte Esterase, Urine NEGATIVE NEGATIVE   Urine Gray Tube   Result Value Ref Range    Extra Tube Hold for add-ons.    Urinalysis Microscopic Only   Result Value Ref Range    WBC, Urine NONE 1-5, NONE /HPF    RBC, Urine NONE NONE, 1-2, 3-5 /HPF    Troponin I, High Sensitivity, Initial   Result Value Ref Range    Troponin I, High Sensitivity 120 (HH) 0 - 20 ng/L   Folate   Result Value Ref Range    Folate, Serum >24.0 >5.0 ng/mL   Hepatitis B surface antigen   Result Value Ref Range    Hepatitis B Surface AG Nonreactive Nonreactive   Troponin, High Sensitivity, 1 Hour   Result Value Ref Range    Troponin I, High Sensitivity 105 (HH) 0 - 20 ng/L   TSH with reflex to Free T4 if abnormal   Result Value Ref Range    Thyroid Stimulating Hormone 2.63 0.44 - 3.98 mIU/L   CBC and Auto Differential   Result Value Ref Range    WBC 4.6 4.4 - 11.3 x10*3/uL    nRBC 0.0 0.0 - 0.0 /100 WBCs    RBC 3.03 (L) 4.50 - 5.90 x10*6/uL    Hemoglobin 10.2 (L) 13.5 - 17.5 g/dL    Hematocrit 32.6 (L) 41.0 - 52.0 %     (H) 80 - 100 fL    MCH 33.7 26.0 - 34.0 pg    MCHC 31.3 (L) 32.0 - 36.0 g/dL    RDW 14.2 11.5 - 14.5 %    Platelets 72 (L) 150 - 450 x10*3/uL    MPV 11.6 (H) 7.5 - 11.5 fL    Neutrophils % 46.6 40.0 - 80.0 %    Immature Granulocytes %, Automated 0.4 0.0 - 0.9 %    Lymphocytes % 40.4 13.0 - 44.0 %    Monocytes % 10.0 2.0 - 10.0 %    Eosinophils % 2.4 0.0 - 6.0 %    Basophils % 0.2 0.0 - 2.0 %    Neutrophils Absolute 2.13 1.60 - 5.50 x10*3/uL    Immature Granulocytes Absolute, Automated 0.02 0.00 - 0.50 x10*3/uL    Lymphocytes Absolute 1.85 0.80 - 3.00 x10*3/uL    Monocytes Absolute 0.46 0.05 - 0.80 x10*3/uL    Eosinophils Absolute 0.11 0.00 - 0.40 x10*3/uL    Basophils Absolute 0.01 0.00 - 0.10 x10*3/uL   Renal function panel   Result Value Ref Range    Glucose 83 74 - 99 mg/dL    Sodium 135 (L) 136 - 145 mmol/L    Potassium 4.9 3.5 - 5.3 mmol/L    Chloride 99 98 - 107 mmol/L    Bicarbonate 25 21 - 32 mmol/L    Anion Gap 16 10 - 20 mmol/L    Urea Nitrogen 54 (H) 6 - 23 mg/dL    Creatinine 4.97 (H) 0.50 - 1.30 mg/dL    eGFR 12 (L) >60 mL/min/1.73m*2    Calcium 10.2 8.6 - 10.3 mg/dL    Phosphorus 3.2 2.5 - 4.9 mg/dL    Albumin 3.9 3.4 - 5.0 g/dL         Impression:    Bipolar depression  VIANEY    Recommendations:    1.  Continue current medication as ordered  2. PRN xanax started to decrease anxiety and overwhelmed feeling  3. Trazodone for sleep started  Will continue to follow      Thank you for allowing us to participate in the care of this patient.       Burton Honeycutt MD  10/13/2023  4:52 PM

## 2023-10-13 NOTE — PROGRESS NOTES
John W Nyhan is a 73 y.o. male on day 2 of admission presenting with Weakness.      Subjective   Patient seen and examined at bedside this morning, he states he slept better through the night but is still anxious and tearful. He is unsure if he wants to continue with his dialysis at this time, and is not receptive to any social work suggestions regarding food security upon discharge. He is scheduled for HD today.        Objective     Last Recorded Vitals  BP (!) 168/98 (BP Location: Right arm)   Pulse 86   Temp 36.1 °C (97 °F)   Resp 18   Wt 58.1 kg (128 lb)   SpO2 97%   Intake/Output last 3 Shifts:    Intake/Output Summary (Last 24 hours) at 10/13/2023 1043  Last data filed at 10/12/2023 1355  Gross per 24 hour   Intake 240 ml   Output --   Net 240 ml       Admission Weight  Weight: 58.1 kg (128 lb) (10/11/23 1606)    Daily Weight  10/11/23 : 58.1 kg (128 lb)    Image Results  CT chest abdomen pelvis w IV contrast  Narrative: Interpreted By:  Mony Garcia,   STUDY:  CT CHEST ABDOMEN PELVIS W IV CONTRAST; ;  10/12/2023 10:47 am      INDICATION:  Signs/Symptoms:Unintentional weight loss, weakness.      COMPARISON:  None.      ACCESSION NUMBER(S):  [id number]      ORDERING CLINICIAN:  HUSSEIN ROGERS      TECHNIQUE:  Imaging was performed from thoracic apex through ischial tuberosities  with axial, coronal and sagittal images reconstructed. Patient  received 75 mL Omnipaque 350 intravenously.      FINDINGS:  Trachea and mainstem bronchi are patent with no endoluminal mass. No  pneumonia or pleural effusion is noted. There is no suspicious  pulmonary nodule or mass.      Thyroid gland is nonenlarged. Densely calcified right paratracheal  and right hilar lymph nodes are noted. There are no enlarged  noncalcified mediastinal or hilar lymph nodes.      A right innominate vein stent is present which has no luminal  opacification indicating occlusion. The right internal jugular vein  is occluded just proximal to  the stent. Left innominate vein is  patent and opacifies the superior vena cava at the terminus of the  right-sided stent at the junction with the vena cava. Vena cava is  opacify into the right atrium.      There are diffuse collateral opacify vessels throughout the chest  wall, larger on the right than on the left. These collateral vessels  extend into the abdomen. Collateral venous drainage extends through  paravertebral veins into the azygous system and the azygous vein is  dilated from collateral drainage into the vena cava.      Descending aorta is tortuous but nondilated. Ascending aorta has  fusiform dilatation measuring up to 4.1 cm. There is no significant  atherosclerotic calcification.      Diffuse coronary calcifications are present. The heart is nonenlarged  but the left ventricle shows concentric hypertrophy. No pericardial  effusion is present.      No mass is noted in the liver. There is no intra or extrahepatic  biliary dilatation. Gallbladder is normal in appearance.      No mass or inflammation is noted involving the pancreas.      Spleen is borderline to mildly enlarged measuring 12.7 x 13.1 x 4.9  cm. A few subcapsular hypodensities are noted in the spleen measuring  1 cm or less, nonspecific. A few scattered splenic calcifications are  present..      Adrenal glands appear normal.      Kidneys are atrophic bilaterally with multiple cysts and multiple  subcentimeter hypodensities. At mid right kidney laterally there is a  lobulated heterogeneously enhancing mass which measures 3.4 x 3.2 x  3.2 cm. There is no dilatation of the right renal vein identified. No  hydronephrosis is noted in either kidney.      Bowel loops are nondilated. Appendix is not identified. No ascites,  pneumoperitoneum or mesenteric inflammation is identified.      Aorta and iliac arteries are diffusely atherosclerotic with no focal  aneurysm. Vena cava is normal in size with no gross evidence of  thrombus. There are no  pathologically enlarged retroperitoneal lymph  nodes.      Urinary bladder is physiologically full with diffuse bladder wall  mild thickening consistent with hypertrophy.      Prostate gland measures 3.9 x 3.7 x 5.0 cm.      Small right indirect inguinal hernia contains fat and a portion of  small bowel loop. At left inguinal ring there is bulging of  mesenteric fat and small bowel, borderline hernia.      Bilateral L5 spondylolysis is associated with grade 1  spondylolisthesis. The L5-S1 disc is severely narrowed and there is  moderate to severe adjacent endplate degenerative change. Scoliosis  is present with thoracic dextroconvex curve and mild lumbar  levocurvature. No osseous metastatic lesions are noted.      Impression: Right renal mass measuring up to 3.4 cm is suspicious for renal  carcinoma. No metastatic disease is noted.      Right innominate vein stent is occluded with collateral vessels  through the chest and abdominal wall, greater on the right than the  left with drainage through the azygous system.      Borderline to mild splenomegaly      4.1 cm aneurysmal dilatation of the ascending aorta          MACRO:  Mony Garcia discussed the significance and urgency of this  critical finding by epic secure chat with  HUSSEIN ROGERS on  10/12/2023 at 11:30am with confirmation reply received 11:55 am.  (**-RCF-**) Findings:  See findings.      Signed by: Mony Garcia 10/12/2023 11:59 AM  Dictation workstation:   [id number]    Results for orders placed or performed during the hospital encounter of 10/11/23 (from the past 24 hour(s))   CBC and Auto Differential   Result Value Ref Range    WBC 4.6 4.4 - 11.3 x10*3/uL    nRBC 0.0 0.0 - 0.0 /100 WBCs    RBC 3.03 (L) 4.50 - 5.90 x10*6/uL    Hemoglobin 10.2 (L) 13.5 - 17.5 g/dL    Hematocrit 32.6 (L) 41.0 - 52.0 %     (H) 80 - 100 fL    MCH 33.7 26.0 - 34.0 pg    MCHC 31.3 (L) 32.0 - 36.0 g/dL    RDW 14.2 11.5 - 14.5 %    Platelets 72 (L) 150 - 450  x10*3/uL    MPV 11.6 (H) 7.5 - 11.5 fL    Neutrophils % 46.6 40.0 - 80.0 %    Immature Granulocytes %, Automated 0.4 0.0 - 0.9 %    Lymphocytes % 40.4 13.0 - 44.0 %    Monocytes % 10.0 2.0 - 10.0 %    Eosinophils % 2.4 0.0 - 6.0 %    Basophils % 0.2 0.0 - 2.0 %    Neutrophils Absolute 2.13 1.60 - 5.50 x10*3/uL    Immature Granulocytes Absolute, Automated 0.02 0.00 - 0.50 x10*3/uL    Lymphocytes Absolute 1.85 0.80 - 3.00 x10*3/uL    Monocytes Absolute 0.46 0.05 - 0.80 x10*3/uL    Eosinophils Absolute 0.11 0.00 - 0.40 x10*3/uL    Basophils Absolute 0.01 0.00 - 0.10 x10*3/uL   Renal function panel   Result Value Ref Range    Glucose 83 74 - 99 mg/dL    Sodium 135 (L) 136 - 145 mmol/L    Potassium 4.9 3.5 - 5.3 mmol/L    Chloride 99 98 - 107 mmol/L    Bicarbonate 25 21 - 32 mmol/L    Anion Gap 16 10 - 20 mmol/L    Urea Nitrogen 54 (H) 6 - 23 mg/dL    Creatinine 4.97 (H) 0.50 - 1.30 mg/dL    eGFR 12 (L) >60 mL/min/1.73m*2    Calcium 10.2 8.6 - 10.3 mg/dL    Phosphorus 3.2 2.5 - 4.9 mg/dL    Albumin 3.9 3.4 - 5.0 g/dL     CT chest abdomen pelvis w IV contrast    Result Date: 10/12/2023  Interpreted By:  Mony Garcia, STUDY: CT CHEST ABDOMEN PELVIS W IV CONTRAST; ;  10/12/2023 10:47 am   INDICATION: Signs/Symptoms:Unintentional weight loss, weakness.   COMPARISON: None.   ACCESSION NUMBER(S): [id number]   ORDERING CLINICIAN: HUSSEIN ROGERS   TECHNIQUE: Imaging was performed from thoracic apex through ischial tuberosities with axial, coronal and sagittal images reconstructed. Patient received 75 mL Omnipaque 350 intravenously.   FINDINGS: Trachea and mainstem bronchi are patent with no endoluminal mass. No pneumonia or pleural effusion is noted. There is no suspicious pulmonary nodule or mass.   Thyroid gland is nonenlarged. Densely calcified right paratracheal and right hilar lymph nodes are noted. There are no enlarged noncalcified mediastinal or hilar lymph nodes.   A right innominate vein stent is present which has  no luminal opacification indicating occlusion. The right internal jugular vein is occluded just proximal to the stent. Left innominate vein is patent and opacifies the superior vena cava at the terminus of the right-sided stent at the junction with the vena cava. Vena cava is opacify into the right atrium.   There are diffuse collateral opacify vessels throughout the chest wall, larger on the right than on the left. These collateral vessels extend into the abdomen. Collateral venous drainage extends through paravertebral veins into the azygous system and the azygous vein is dilated from collateral drainage into the vena cava.   Descending aorta is tortuous but nondilated. Ascending aorta has fusiform dilatation measuring up to 4.1 cm. There is no significant atherosclerotic calcification.   Diffuse coronary calcifications are present. The heart is nonenlarged but the left ventricle shows concentric hypertrophy. No pericardial effusion is present.   No mass is noted in the liver. There is no intra or extrahepatic biliary dilatation. Gallbladder is normal in appearance.   No mass or inflammation is noted involving the pancreas.   Spleen is borderline to mildly enlarged measuring 12.7 x 13.1 x 4.9 cm. A few subcapsular hypodensities are noted in the spleen measuring 1 cm or less, nonspecific. A few scattered splenic calcifications are present..   Adrenal glands appear normal.   Kidneys are atrophic bilaterally with multiple cysts and multiple subcentimeter hypodensities. At mid right kidney laterally there is a lobulated heterogeneously enhancing mass which measures 3.4 x 3.2 x 3.2 cm. There is no dilatation of the right renal vein identified. No hydronephrosis is noted in either kidney.   Bowel loops are nondilated. Appendix is not identified. No ascites, pneumoperitoneum or mesenteric inflammation is identified.   Aorta and iliac arteries are diffusely atherosclerotic with no focal aneurysm. Vena cava is normal in  size with no gross evidence of thrombus. There are no pathologically enlarged retroperitoneal lymph nodes.   Urinary bladder is physiologically full with diffuse bladder wall mild thickening consistent with hypertrophy.   Prostate gland measures 3.9 x 3.7 x 5.0 cm.   Small right indirect inguinal hernia contains fat and a portion of small bowel loop. At left inguinal ring there is bulging of mesenteric fat and small bowel, borderline hernia.   Bilateral L5 spondylolysis is associated with grade 1 spondylolisthesis. The L5-S1 disc is severely narrowed and there is moderate to severe adjacent endplate degenerative change. Scoliosis is present with thoracic dextroconvex curve and mild lumbar levocurvature. No osseous metastatic lesions are noted.       Right renal mass measuring up to 3.4 cm is suspicious for renal carcinoma. No metastatic disease is noted.   Right innominate vein stent is occluded with collateral vessels through the chest and abdominal wall, greater on the right than the left with drainage through the azygous system.   Borderline to mild splenomegaly   4.1 cm aneurysmal dilatation of the ascending aorta     MACRO: Mony Garcia discussed the significance and urgency of this critical finding by epic secure chat with  HUSSEIN ROGERS on 10/12/2023 at 11:30am with confirmation reply received 11:55 am. (**-RCF-**) Findings:  See findings.   Signed by: Mony Garcia 10/12/2023 11:59 AM Dictation workstation:   [id number]    XR chest 2 views    Result Date: 10/11/2023  Interpreted By:  Shlomo Marinelli, STUDY: XR CHEST 2 VIEWS;  10/11/2023 6:43 pm   INDICATION: Signs/Symptoms:weakness.   COMPARISON: 02/16/2023   ACCESSION NUMBER(S): [id number]   ORDERING CLINICIAN: JOSE ART   FINDINGS: Heart size is enlarged. Vascular stent is similar. Tortuous thoracic aorta. No pneumonia or pulmonary edema. No pneumothorax. Probable changes of old healed granulomas disease       1.  No evidence of acute  cardiopulmonary process.  Stable exam       MACRO: None   Signed by: Shlomo Marinelli 10/11/2023 7:23 PM Dictation workstation:   [id number]        Current Facility-Administered Medications:     B complex-vitamin C-folic acid (Nephrocaps) capsule 1 capsule, 1 capsule, oral, Daily, Tank Weems MD, 1 capsule at 10/13/23 0950    buPROPion SR (Wellbutrin SR) 12 hr tablet 200 mg, 200 mg, oral, Daily, Farnaz Booker MD, 200 mg at 10/13/23 0955    calcium acetate (Phoslo) capsule 667 mg, 667 mg, oral, TID with meals, Farnaz Booker MD, 667 mg at 10/13/23 0950    divalproex (Depakote ER) 24 hr tablet 250 mg, 250 mg, oral, Daily, Scotthubert Marie, DO, 250 mg at 10/12/23 2100    heparin (porcine) injection 5,000 Units, 5,000 Units, subcutaneous, q8h, Farnaz Booker MD, 5,000 Units at 10/13/23 1030    melatonin tablet 5 mg, 5 mg, oral, Nightly, Burton Honeycutt MD, 5 mg at 10/12/23 2039    midodrine (Proamatine) tablet 10 mg, 10 mg, oral, Daily PRN, Farnaz Booker MD    polyethylene glycol (Glycolax, Miralax) packet 17 g, 17 g, oral, Daily, Ciraa Gibson DO, 17 g at 10/13/23 0950    sennosides (Senokot) tablet 17.2 mg, 2 tablet, oral, BID, Ciara Gibson DO, 17.2 mg at 10/13/23 0951    venlafaxine XR (Effexor-XR) 24 hr capsule 150 mg, 150 mg, oral, Daily with breakfast, Burton Honeycutt MD, 150 mg at 10/13/23 0950     Physical Exam  Constitutional:       Appearance: He is underweight.   HENT:      Head: Normocephalic and atraumatic.   Eyes:      General: No scleral icterus.     Extraocular Movements: Extraocular movements intact.   Cardiovascular:      Rate and Rhythm: Normal rate.      Pulses: Normal pulses.      Heart sounds: Murmur heard.      Systolic murmur is present.   Pulmonary:      Effort: Pulmonary effort is normal.      Breath sounds: Normal breath sounds.   Abdominal:      General: Abdomen is flat. Bowel sounds are normal.      Palpations: Abdomen is soft.      Tenderness: There  is no abdominal tenderness.   Musculoskeletal:      Cervical back: Full passive range of motion without pain and neck supple.      Comments: AV fistula in place in left upper extremity, no signs of infection.   Skin:     General: Skin is warm and dry.      Capillary Refill: Capillary refill takes less than 2 seconds.   Neurological:      General: No focal deficit present.      Mental Status: He is alert and oriented to person, place, and time. Mental status is at baseline.      Motor: Weakness present.      Comments: Strength 4/5 bilaterally in upper and lower extremities. No focal deficit.    Psychiatric:         Mood and Affect: Mood is depressed. Affect is flat and tearful.         Speech: Speech normal. Speech is not rapid and pressured.         Behavior: Behavior is withdrawn. Behavior is cooperative.          Assessment/Plan    Principal Problem:    Weakness  Active Problems:    ESRD on dialysis (CMS/HCC)    Macrocytic anemia    John Nyhan is a 72-year-old male with a past medical history of ESRD on hemodialysis who presented to the [facility] ED from his dialysis facility due to the complaint of generalized weakness for approximately 3 weeks.  Reduced appetite however no other complaints at this time.  Per chart review, patient has had previous admissions for similar complaints requiring placement for rehab.     #Generalized Weakness, fatigue  #ESRD on HD (Monday, Wednesday, Friday)  #Macrocytic Anemia  #Malnutrition  - Right renal mass measuring up to 3.4 cm is suspicious for renal carcinoma. - No metastatic disease is noted. Urology onboard recommending nephrectomy.   - Vascular consulted for right innominate vein stent being occluded, no intervention at this time as there is adequate collateral drainage.  - HD today, on schedule. Nephrology on consultation for HD - MWF.  - Suspect patient will likely require placement with potential need for assisted living after discharge. Social work consulted. He is not  willing to receive much assistance, declining Meals on Wheels and other food resources. He would just like to have someone cook for him.   - Palliative consulted to discuss his goals of care given his ESRD and newly diagnosed renal mass. Patient has expressed that he may not wish to continue dialysis.   - Continue to monitor electrolytes with AM RFP.  - PT/OT in place.  - Daily CBC, BMP.  - TSH wnl    #Depression  #Anxiety  - Psychiatry increasing his Effexor to 150 mg given he is clinically depressed.   - No SI or HI at this time. No imminent risk of suicide per psychiatry.   - Melatonin for sleep.  - He will follow up out patient with psych.     #Elevated troponins  #Hx CAD  - Trop 123, 120 and then 105. Likely elevated in the setting of ESRD  - EKG without any evidence of acute ischemia  - Patient denies any chest pain shortness of breath.  No concern for ACS at this time  - Cardiology consulted from the ED, no recommendations at this time.  - We will continue to monitor.      Chronic  #Bipolar disorder  #Essential hypertension  - Continue home medications  - Continue home medications once medication reconciliation is complete  - Effexor increased to 125 mg per psych     DVT prophylaxis: Patient is low risk for DVT, no chemical prophylaxis at this time  Diet: Renal  CODE STATUS: Full code      This is a preliminary note, please await final signature from attending physician.     Chris Valencia, MS4  Internal Medicine Team

## 2023-10-13 NOTE — CONSULTS
"Reason For Consult  ESRD on HD, depressed, recent dignosis of renal mass; concerns for cancer; more family support     History Of Present Illness  John W Nyhan is a 73 y.o. male presenting with weakness.     Past Medical History  He has a past medical history of Bipolar disorder, unspecified (CMS/[facility]) and End stage renal disease (CMS/[facility]) (05/27/2015).     Social History  He reports that he has never smoked. He has never used smokeless tobacco. No history on file for alcohol use and drug use. He lives alone with no local family support. PIEDADK is brother, [place] in [place].      Assessment/Plan     Palliative care consulted due to new diagnosis of kidney mass with nephrectomy as recommended treatment plan. Also, patient has stated that he is unsure that he wants to continue doing dialysis. He has been doing outpatient dialysis for 14 years.     Palliative care met with patient and MD, Dr. Matthews at bedside to discuss prognosis and goals of care. Patient very tearful throughout conversation and slow to respond. Feels overwhelmed with the news of the kidney cancer. Medical team communicated that it has not spread and they feel that the nephrectomy will take care of it. However, at this point, patient is still repeating that he does not want to go to SNF or rehab. He has been doing this cycle for years and \"hates rehab\". At this point, he feels that continuing HD and going to rehab are possibly not in line with his goals for quality of life. We discussed hospice and what that would mean for him and his options through hospice. He states he would like to talk with hospice about his options. He states he needs a day to think and to talk to his brother and prefers for hospice meeting to occur tomorrow. He is interested in [facility] as he lives alone in an apartment with no support and the [place] facility may be his best option. He recognizes that stopping dialysis will mean that he will die within a few weeks. This does create " more tearfulness and some anxiety, but ultimately, it seems he feels this decision lines up with his goals. Per patient's request, Dr. Matthews to discuss the medical findings with patient's brother via phone this afternoon so that he can be a source of support.    In addition to the above, patient is struggling with a lack of Zoroastrian peace, stating that he hasn't been to mass in a long time and also he's not sure if there will be Zoroastrian implications for stopping HD. We processed this information together minimally, but ultimately, palliative care referred this patient to [facility] to follow up with him for a more in depth discussion.    Referral placed to [facility] with request for hospice informational tomorrow.     Vanda Wilson, KARLIEW

## 2023-10-13 NOTE — PROGRESS NOTES
Palliative met with patient today.  Referral made for informational meeting with [facility] tomorrow.     CHAZ Jo

## 2023-10-14 LAB — HBV SURFACE AB SER-ACNC: 11.3 MIU/ML

## 2023-10-14 PROCEDURE — 2500000001 HC RX 250 WO HCPCS SELF ADMINISTERED DRUGS (ALT 637 FOR MEDICARE OP): Performed by: EMERGENCY MEDICINE

## 2023-10-14 PROCEDURE — 2500000004 HC RX 250 GENERAL PHARMACY W/ HCPCS (ALT 636 FOR OP/ED): Performed by: STUDENT IN AN ORGANIZED HEALTH CARE EDUCATION/TRAINING PROGRAM

## 2023-10-14 PROCEDURE — 2500000004 HC RX 250 GENERAL PHARMACY W/ HCPCS (ALT 636 FOR OP/ED): Performed by: PSYCHIATRY & NEUROLOGY

## 2023-10-14 PROCEDURE — 2500000001 HC RX 250 WO HCPCS SELF ADMINISTERED DRUGS (ALT 637 FOR MEDICARE OP): Performed by: INTERNAL MEDICINE

## 2023-10-14 PROCEDURE — 2500000004 HC RX 250 GENERAL PHARMACY W/ HCPCS (ALT 636 FOR OP/ED)

## 2023-10-14 PROCEDURE — 99232 SBSQ HOSP IP/OBS MODERATE 35: CPT

## 2023-10-14 PROCEDURE — 96372 THER/PROPH/DIAG INJ SC/IM: CPT | Performed by: STUDENT IN AN ORGANIZED HEALTH CARE EDUCATION/TRAINING PROGRAM

## 2023-10-14 PROCEDURE — 2500000001 HC RX 250 WO HCPCS SELF ADMINISTERED DRUGS (ALT 637 FOR MEDICARE OP)

## 2023-10-14 PROCEDURE — 1100000001 HC PRIVATE ROOM DAILY

## 2023-10-14 PROCEDURE — 2500000001 HC RX 250 WO HCPCS SELF ADMINISTERED DRUGS (ALT 637 FOR MEDICARE OP): Performed by: PSYCHIATRY & NEUROLOGY

## 2023-10-14 PROCEDURE — 2500000001 HC RX 250 WO HCPCS SELF ADMINISTERED DRUGS (ALT 637 FOR MEDICARE OP): Performed by: STUDENT IN AN ORGANIZED HEALTH CARE EDUCATION/TRAINING PROGRAM

## 2023-10-14 RX ADMIN — Medication 5 MG: at 00:05

## 2023-10-14 RX ADMIN — CALCIUM ACETATE 667 MG: 667 CAPSULE ORAL at 18:48

## 2023-10-14 RX ADMIN — HEPARIN SODIUM 5000 UNITS: 5000 INJECTION INTRAVENOUS; SUBCUTANEOUS at 02:41

## 2023-10-14 RX ADMIN — SENNOSIDES 17.2 MG: 8.6 TABLET, FILM COATED ORAL at 21:54

## 2023-10-14 RX ADMIN — DIVALPROEX SODIUM 250 MG: 250 TABLET, EXTENDED RELEASE ORAL at 21:54

## 2023-10-14 RX ADMIN — SENNOSIDES 17.2 MG: 8.6 TABLET, FILM COATED ORAL at 10:02

## 2023-10-14 RX ADMIN — BUPROPION HYDROCHLORIDE 200 MG: 100 TABLET, FILM COATED, EXTENDED RELEASE ORAL at 10:04

## 2023-10-14 RX ADMIN — Medication 5 MG: at 21:54

## 2023-10-14 RX ADMIN — POLYETHYLENE GLYCOL 3350 17 G: 17 POWDER, FOR SOLUTION ORAL at 10:05

## 2023-10-14 RX ADMIN — Medication 1 CAPSULE: at 10:03

## 2023-10-14 RX ADMIN — CALCIUM ACETATE 667 MG: 667 CAPSULE ORAL at 12:00

## 2023-10-14 RX ADMIN — VENLAFAXINE HYDROCHLORIDE 150 MG: 75 CAPSULE, EXTENDED RELEASE ORAL at 10:03

## 2023-10-14 RX ADMIN — TRAZODONE HYDROCHLORIDE 50 MG: 50 TABLET ORAL at 21:54

## 2023-10-14 RX ADMIN — CALCIUM ACETATE 667 MG: 667 CAPSULE ORAL at 10:02

## 2023-10-14 RX ADMIN — HEPARIN SODIUM 5000 UNITS: 5000 INJECTION INTRAVENOUS; SUBCUTANEOUS at 10:05

## 2023-10-14 ASSESSMENT — COGNITIVE AND FUNCTIONAL STATUS - GENERAL
WALKING IN HOSPITAL ROOM: A LITTLE
CLIMB 3 TO 5 STEPS WITH RAILING: A LOT
DRESSING REGULAR UPPER BODY CLOTHING: A LITTLE
TOILETING: A LITTLE
PERSONAL GROOMING: A LITTLE
DRESSING REGULAR LOWER BODY CLOTHING: A LITTLE
MOVING TO AND FROM BED TO CHAIR: A LITTLE
STANDING UP FROM CHAIR USING ARMS: A LITTLE
MOBILITY SCORE: 18
DAILY ACTIVITIY SCORE: 19
TURNING FROM BACK TO SIDE WHILE IN FLAT BAD: A LITTLE
HELP NEEDED FOR BATHING: A LITTLE

## 2023-10-14 ASSESSMENT — PAIN - FUNCTIONAL ASSESSMENT: PAIN_FUNCTIONAL_ASSESSMENT: 0-10

## 2023-10-14 ASSESSMENT — PAIN SCALES - GENERAL
PAINLEVEL_OUTOF10: 0 - NO PAIN
PAINLEVEL_OUTOF10: 0 - NO PAIN

## 2023-10-14 NOTE — CARE PLAN
Problem: Nutrition  Goal: Oral intake greater 75%  Outcome: Progressing  Goal: Consume prescribed supplement  Outcome: Progressing  Goal: Adequate PO fluid intake  Outcome: Progressing  Goal: Nutrition support goals are met within 48 hrs  Outcome: Progressing  Goal: BG  mg/dL  Outcome: Progressing  Goal: Lab values WNL  Outcome: Progressing  Goal: Electrolytes WNL  Outcome: Progressing  Goal: Promote healing  Outcome: Progressing  Goal: Maintain stable weight  Outcome: Progressing     Problem: Pain - Adult  Goal: Verbalizes/displays adequate comfort level or baseline comfort level  Outcome: Progressing     Problem: Safety - Adult  Goal: Free from fall injury  Outcome: Progressing     Problem: Discharge Planning  Goal: Discharge to home or other facility with appropriate resources  Outcome: Progressing     Problem: Chronic Conditions and Co-morbidities  Goal: Patient's chronic conditions and co-morbidity symptoms are monitored and maintained or improved  Outcome: Progressing     Problem: Skin  Goal: Decreased wound size/increased tissue granulation at next dressing change  Outcome: Progressing  Goal: Participates in plan/prevention/treatment measures  Outcome: Progressing  Goal: Prevent/manage excess moisture  Outcome: Progressing  Goal: Prevent/minimize sheer/friction injuries  Outcome: Progressing  Goal: Promote/optimize nutrition  Outcome: Progressing  Goal: Promote skin healing  Outcome: Progressing     Problem: Fall/Injury  Goal: Verbalize understanding of personal risk factors for fall in the hospital  Outcome: Progressing  Goal: Verbalize understanding of risk factor reduction measures to prevent injury from fall in the home  Outcome: Progressing  Goal: Use assistive devices by end of the shift  Outcome: Progressing  Goal: Pace activities to prevent fatigue by end of the shift  Outcome: Progressing   The patient's goals for the shift include      The clinical goals for the shift include Patient will  demonstrate appropriate use of call light for assistance through end of shift.

## 2023-10-14 NOTE — CARE PLAN
Problem: Nutrition  Goal: Less than 5 days NPO/clear liquids  Outcome: Progressing  Goal: Oral intake greater than 50%  Outcome: Progressing  Goal: Oral intake greater 75%  Outcome: Progressing  Goal: Consume prescribed supplement  Outcome: Progressing  Goal: Adequate PO fluid intake  Outcome: Progressing  Goal: Nutrition support goals are met within 48 hrs  Outcome: Progressing  Goal: Nutrition support is meeting 75% of nutrient needs  Outcome: Progressing  Goal: Tube feed tolerance  Outcome: Progressing  Goal: BG  mg/dL  Outcome: Progressing  Goal: Lab values WNL  Outcome: Progressing  Goal: Electrolytes WNL  Outcome: Progressing  Goal: Promote healing  Outcome: Progressing  Goal: Maintain stable weight  Outcome: Progressing  Goal: Reduce weight from edema/fluid  Outcome: Progressing  Goal: Gradual weight gain  Outcome: Progressing  Goal: Improve ostomy output  Outcome: Progressing

## 2023-10-14 NOTE — PROGRESS NOTES
John W Nyhan is a 73 y.o. male on day 3 of admission presenting with Weakness.      Subjective   Tolerated HD yesterday       Objective     Last Recorded Vitals  /90 (BP Location: Right arm, Patient Position: Lying)   Pulse 70   Temp 36.1 °C (97 °F) (Temporal)   Resp 19   Wt 58.1 kg (128 lb)   SpO2 100%   Intake/Output last 3 Shifts:    Intake/Output Summary (Last 24 hours) at 10/14/2023 1017  Last data filed at 10/13/2023 1915  Gross per 24 hour   Intake 1200 ml   Output 500 ml   Net 700 ml       Admission Weight  Weight: 58.1 kg (128 lb) (10/11/23 1606)    Daily Weight  10/11/23 : 58.1 kg (128 lb)    Image Results  CT kidney w and wo IV contrast  Narrative: Interpreted By:  Amber Jose,   STUDY:  CT KIDNEY W AND WO IV CONTRAST;  10/13/2023 1:07 pm      INDICATION:  Signs/Symptoms:renal mass, further characterization.      COMPARISON:  None.      ACCESSION NUMBER(S):  [id number]      ORDERING CLINICIAN:  DIANA IBRAHIM      TECHNIQUE:  CT of the abdomen was performed. Examination was performed without  intravenous contrast. This was followed by images in arterial and  portal venous phases. Without, with 75 mL Omnipaque 350      FINDINGS:  LOWER CHEST:  Images of the lung bases show no infiltrate or pleural fluid.  There is bibasilar scarring.  There is a 3 mm smooth noncalcified nodule abutting the pleura in the  right lower lobe laterally on series 202/axial image 56/111,  unchanged compared to 11/10/2022 There is a 4 mm smooth noncalcified  nodule in the lateral segment of the right middle lobe on axial image  69/111, unchanged compared to oldest chest CT from 11/10/2022 There  is atherosclerotic calcification of the coronary arteries.      ABDOMEN:      LIVER:  There is no hepatic mass. There are benign calcified hepatic  granulomas.      BILE DUCTS:  There is no intrahepatic, common hepatic or common bile ductal  dilatation.      GALLBLADDER:  There is vicarious excretion of contrast       PANCREAS:  The pancreas is unremarkable.      SPLEEN:  The spleen is enlarged measuring 12.6 cm in craniocaudad dimension.  There are benign calcified splenic granulomas.      ADRENAL GLANDS:  The adrenal glands are unremarkable.      KIDNEYS AND URETERS:  The kidneys function symmetrically.  The kidneys are small in size.  There is a 3.5 x 2.7 cm heterogeneous solid mass projecting off the  lateral cortex of the right kidney suspicious for renal cell  carcinoma. This has central low density which may represent necrosis.  On the noncontrast study, the wall has a Hounsfield unit measurement  of 66. On the images during arterial phase of injection, the wall  measures 68 Hounsfield units. On the delayed images, the wall  measures 78 Hounsfield units. There are numerous bilateral simple  renal cysts. There are punctate bilateral nonobstructing intrarenal  calculi.      BOWEL:  There is no bowel wall thickening, dilatation or obstruction.      VESSELS:  There is atherosclerotic calcification of the abdominal aorta.  There are exuberant lower chest and abdominal wall varices. The  inferior vena cava enhances.      PERITONEUM/RETROPERITONEUM/LYMPH NODES:  There is no retroperitoneal adenopathy.  There is no ascites.      ABDOMINAL WALL:  The abdominal wall is unremarkable.      BONE AND SOFT TISSUE:  There is no acute osseous finding.      There is no soft tissue abnormality.      Impression: 1. 3.5 x 2.7 cm heterogeneous necrotic solid mass off the lateral  cortex of the right kidney consistent renal neoplasm and possible  renal cell carcinoma.  2. Bilateral simple renal cysts.  3. Small kidneys.  4. Stable subcentimeter right middle and lower lobe pulmonary nodules  compared to oldest chest CT dated 11/10/2022 Fleischner guidelines:  Incidental Finding: Multiple non-calcified pulmonary nodules  measuring less than 6 mm, likely benign.  (**-YCF-**)      Instructions:  No further follow-up is required, however, if  the  patient has high risk factors for primary lung malignancy, follow-up  noncontrast CT scan chest in 12 months may be obtained. (Sadiq Baker et al., Guidelines for management of incidental pulmonary  nodules detected on CT images: From the Fleischner Society 2017,  Radiology. 2017 Jul;284 (1):228-243.) [email or web address]      MACRO:      None      Signed by: Amber Jose 10/14/2023 10:15 AM  Dictation workstation:   [id number]      Physical Exam    Neck: supple  Lung: no wheeze  cV: [id number]  exT: no c/c    Relevant Results      Results for orders placed or performed during the hospital encounter of 10/11/23 (from the past 24 hour(s))   Hepatitis B surface antibody   Result Value Ref Range    Hepatitis B Surface AB 11.3 (H) <10.0 mIU/mL                Assessment/Plan          Assessment  ESRD on HD MWF  Failure to thrive/weight loss  Renal mass   Depression  Anemia of ckd  HTN     Plan   Maintain on Hd MWF  Next HD 10/16             Tank Weems MD

## 2023-10-14 NOTE — NURSING NOTE
"RN Hospice Note    Hospice terminal diagnosis: ESRD  Physician: Dr. Concepcion Matthews  Visit type: Informational Visit    Comments/recommendations: Met with pt.  Dr. Concepcion Matthews also present.  Pt declined to have brother called to participate in meeting.  Medicare hospice benefit/philosophy of care explained.  Hospice care at home, NF, and hospice inpatient unit explained. Pt tearful.  Pt states \"I don't think I can do it\".  Pt states he believes having hospice care and stopping dialysis goes against his [employer]. Pt states he is Congregation, however he practices \"more traditional\" aspect of Orthodox Pent[employer].  This RN offered to have hospital  meet with him.  Pt declined at this time.  Support, presence, and active listening provided.  Offered to leave hospice literature and contact number, pt declined.  Pt requests no further follow up at this time.      Plan of care reviewed with patient/family members:  Pt only, no family present   Plan of care reviewed with hospital staff members: Elmer Carlton, PIOTR Hubbard RN, TCC                                                                                          Dr. Concepcion Matthews    Please notify [facility] of any changes in condition. Thank you.  Office: [phone] (8 am-6:30 pm M-F and 8 am-4:30 pm weekends and holidays)   [phone] (6:30 pm-8 am M-F and 4:30 pm-8 am weekends and holidays)    Svitlana Messer RN      "

## 2023-10-14 NOTE — PROGRESS NOTES
"John W Nyhan is a 73 y.o. male on day 3 of admission presenting with Weakness.      Subjective   Patient seen and examined at bedside this morning, he is sleeping. Patient did woke up and state HD was \"okay\". Patient did not have any other complaints this morning        Objective     Last Recorded Vitals  /75 (BP Location: Right arm)   Pulse 66   Temp 36.2 °C (97.2 °F) (Temporal)   Resp 19   Wt 58.1 kg (128 lb)   SpO2 100%   Intake/Output last 3 Shifts:    Intake/Output Summary (Last 24 hours) at 10/14/2023 1310  Last data filed at 10/13/2023 1915  Gross per 24 hour   Intake 1200 ml   Output 500 ml   Net 700 ml         Admission Weight  Weight: 58.1 kg (128 lb) (10/11/23 1606)    Daily Weight  10/11/23 : 58.1 kg (128 lb)    Image Results  CT kidney w and wo IV contrast  Narrative: Interpreted By:  Amber Jose,   STUDY:  CT KIDNEY W AND WO IV CONTRAST;  10/13/2023 1:07 pm      INDICATION:  Signs/Symptoms:renal mass, further characterization.      COMPARISON:  None.      ACCESSION NUMBER(S):  [id number]      ORDERING CLINICIAN:  DIANA IBRAHIM      TECHNIQUE:  CT of the abdomen was performed. Examination was performed without  intravenous contrast. This was followed by images in arterial and  portal venous phases. Without, with 75 mL Omnipaque 350      FINDINGS:  LOWER CHEST:  Images of the lung bases show no infiltrate or pleural fluid.  There is bibasilar scarring.  There is a 3 mm smooth noncalcified nodule abutting the pleura in the  right lower lobe laterally on series 202/axial image 56/111,  unchanged compared to 11/10/2022 There is a 4 mm smooth noncalcified  nodule in the lateral segment of the right middle lobe on axial image  69/111, unchanged compared to oldest chest CT from 11/10/2022 There  is atherosclerotic calcification of the coronary arteries.      ABDOMEN:      LIVER:  There is no hepatic mass. There are benign calcified hepatic  granulomas.      BILE DUCTS:  There is no " intrahepatic, common hepatic or common bile ductal  dilatation.      GALLBLADDER:  There is vicarious excretion of contrast      PANCREAS:  The pancreas is unremarkable.      SPLEEN:  The spleen is enlarged measuring 12.6 cm in craniocaudad dimension.  There are benign calcified splenic granulomas.      ADRENAL GLANDS:  The adrenal glands are unremarkable.      KIDNEYS AND URETERS:  The kidneys function symmetrically.  The kidneys are small in size.  There is a 3.5 x 2.7 cm heterogeneous solid mass projecting off the  lateral cortex of the right kidney suspicious for renal cell  carcinoma. This has central low density which may represent necrosis.  On the noncontrast study, the wall has a Hounsfield unit measurement  of 66. On the images during arterial phase of injection, the wall  measures 68 Hounsfield units. On the delayed images, the wall  measures 78 Hounsfield units. There are numerous bilateral simple  renal cysts. There are punctate bilateral nonobstructing intrarenal  calculi.      BOWEL:  There is no bowel wall thickening, dilatation or obstruction.      VESSELS:  There is atherosclerotic calcification of the abdominal aorta.  There are exuberant lower chest and abdominal wall varices. The  inferior vena cava enhances.      PERITONEUM/RETROPERITONEUM/LYMPH NODES:  There is no retroperitoneal adenopathy.  There is no ascites.      ABDOMINAL WALL:  The abdominal wall is unremarkable.      BONE AND SOFT TISSUE:  There is no acute osseous finding.      There is no soft tissue abnormality.      Impression: 1. 3.5 x 2.7 cm heterogeneous necrotic solid mass off the lateral  cortex of the right kidney consistent renal neoplasm and possible  renal cell carcinoma.  2. Bilateral simple renal cysts.  3. Small kidneys.  4. Stable subcentimeter right middle and lower lobe pulmonary nodules  compared to oldest chest CT dated 11/10/2022 Fleischner guidelines:  Incidental Finding: Multiple non-calcified pulmonary  nodules  measuring less than 6 mm, likely benign.  (**-YCF-**)      Instructions:  No further follow-up is required, however, if the  patient has high risk factors for primary lung malignancy, follow-up  noncontrast CT scan chest in 12 months may be obtained. (Sadiq Bateman al., Guidelines for management of incidental pulmonary  nodules detected on CT images: From the Fleischner Society 2017,  Radiology. 2017 Jul;284 (1):228-243.) [email or web address]      MACRO:      None      Signed by: Amber Jose 10/14/2023 10:15 AM  Dictation workstation:   [id number]    Results for orders placed or performed during the hospital encounter of 10/11/23 (from the past 24 hour(s))   Hepatitis B surface antibody   Result Value Ref Range    Hepatitis B Surface AB 11.3 (H) <10.0 mIU/mL     CT chest abdomen pelvis w IV contrast    Result Date: 10/12/2023  Interpreted By:  Mony Garcia, STUDY: CT CHEST ABDOMEN PELVIS W IV CONTRAST; ;  10/12/2023 10:47 am   INDICATION: Signs/Symptoms:Unintentional weight loss, weakness.   COMPARISON: None.   ACCESSION NUMBER(S): [id number]   ORDERING CLINICIAN: HUSSEIN ROGERS   TECHNIQUE: Imaging was performed from thoracic apex through ischial tuberosities with axial, coronal and sagittal images reconstructed. Patient received 75 mL Omnipaque 350 intravenously.   FINDINGS: Trachea and mainstem bronchi are patent with no endoluminal mass. No pneumonia or pleural effusion is noted. There is no suspicious pulmonary nodule or mass.   Thyroid gland is nonenlarged. Densely calcified right paratracheal and right hilar lymph nodes are noted. There are no enlarged noncalcified mediastinal or hilar lymph nodes.   A right innominate vein stent is present which has no luminal opacification indicating occlusion. The right internal jugular vein is occluded just proximal to the stent. Left innominate vein is patent and opacifies the superior vena cava at the terminus of the right-sided stent at the  junction with the vena cava. Vena cava is opacify into the right atrium.   There are diffuse collateral opacify vessels throughout the chest wall, larger on the right than on the left. These collateral vessels extend into the abdomen. Collateral venous drainage extends through paravertebral veins into the azygous system and the azygous vein is dilated from collateral drainage into the vena cava.   Descending aorta is tortuous but nondilated. Ascending aorta has fusiform dilatation measuring up to 4.1 cm. There is no significant atherosclerotic calcification.   Diffuse coronary calcifications are present. The heart is nonenlarged but the left ventricle shows concentric hypertrophy. No pericardial effusion is present.   No mass is noted in the liver. There is no intra or extrahepatic biliary dilatation. Gallbladder is normal in appearance.   No mass or inflammation is noted involving the pancreas.   Spleen is borderline to mildly enlarged measuring 12.7 x 13.1 x 4.9 cm. A few subcapsular hypodensities are noted in the spleen measuring 1 cm or less, nonspecific. A few scattered splenic calcifications are present..   Adrenal glands appear normal.   Kidneys are atrophic bilaterally with multiple cysts and multiple subcentimeter hypodensities. At mid right kidney laterally there is a lobulated heterogeneously enhancing mass which measures 3.4 x 3.2 x 3.2 cm. There is no dilatation of the right renal vein identified. No hydronephrosis is noted in either kidney.   Bowel loops are nondilated. Appendix is not identified. No ascites, pneumoperitoneum or mesenteric inflammation is identified.   Aorta and iliac arteries are diffusely atherosclerotic with no focal aneurysm. Vena cava is normal in size with no gross evidence of thrombus. There are no pathologically enlarged retroperitoneal lymph nodes.   Urinary bladder is physiologically full with diffuse bladder wall mild thickening consistent with hypertrophy.   Prostate gland  measures 3.9 x 3.7 x 5.0 cm.   Small right indirect inguinal hernia contains fat and a portion of small bowel loop. At left inguinal ring there is bulging of mesenteric fat and small bowel, borderline hernia.   Bilateral L5 spondylolysis is associated with grade 1 spondylolisthesis. The L5-S1 disc is severely narrowed and there is moderate to severe adjacent endplate degenerative change. Scoliosis is present with thoracic dextroconvex curve and mild lumbar levocurvature. No osseous metastatic lesions are noted.       Right renal mass measuring up to 3.4 cm is suspicious for renal carcinoma. No metastatic disease is noted.   Right innominate vein stent is occluded with collateral vessels through the chest and abdominal wall, greater on the right than the left with drainage through the azygous system.   Borderline to mild splenomegaly   4.1 cm aneurysmal dilatation of the ascending aorta     MACRO: Mony Garcia discussed the significance and urgency of this critical finding by epic secure chat with  HUSSEIN ROGERS on 10/12/2023 at 11:30am with confirmation reply received 11:55 am. (**-RCF-**) Findings:  See findings.   Signed by: Mony Garcia 10/12/2023 11:59 AM Dictation workstation:   [id number]    XR chest 2 views    Result Date: 10/11/2023  Interpreted By:  Shlomo Marinelli, STUDY: XR CHEST 2 VIEWS;  10/11/2023 6:43 pm   INDICATION: Signs/Symptoms:weakness.   COMPARISON: 02/16/2023   ACCESSION NUMBER(S): [id number]   ORDERING CLINICIAN: JOSE ART   FINDINGS: Heart size is enlarged. Vascular stent is similar. Tortuous thoracic aorta. No pneumonia or pulmonary edema. No pneumothorax. Probable changes of old healed granulomas disease       1.  No evidence of acute cardiopulmonary process.  Stable exam       MACRO: None   Signed by: Shlomo Marinelli 10/11/2023 7:23 PM Dictation workstation:   [id number]        Current Facility-Administered Medications:     ALPRAZolam (Xanax) tablet 0.25 mg, 0.25 mg, oral, TID  PRN, Burton Honeycutt MD    B complex-vitamin C-folic acid (Nephrocaps) capsule 1 capsule, 1 capsule, oral, Daily, Tank Weems MD, 1 capsule at 10/14/23 1003    buPROPion SR (Wellbutrin SR) 12 hr tablet 200 mg, 200 mg, oral, Daily, Farnaz Booker MD, 200 mg at 10/14/23 1004    calcium acetate (Phoslo) capsule 667 mg, 667 mg, oral, TID with meals, Farnaz Booker MD, 667 mg at 10/14/23 1002    divalproex (Depakote ER) 24 hr tablet 250 mg, 250 mg, oral, Daily, Scott Marie DO, 250 mg at 10/13/23 2114    heparin (porcine) injection 5,000 Units, 5,000 Units, subcutaneous, q8h, Farnaz Booker MD, 5,000 Units at 10/14/23 1005    melatonin tablet 5 mg, 5 mg, oral, Nightly, Ciara Gibson DO, 5 mg at 10/14/23 0005    midodrine (Proamatine) tablet 10 mg, 10 mg, oral, Daily PRN, Farnaz Booker MD    polyethylene glycol (Glycolax, Miralax) packet 17 g, 17 g, oral, Daily, Ciara Gibson DO, 17 g at 10/14/23 1005    sennosides (Senokot) tablet 17.2 mg, 2 tablet, oral, BID, Ciara Gibson DO, 17.2 mg at 10/14/23 1002    traZODone (Desyrel) tablet 50 mg, 50 mg, oral, Nightly, Burton Honeycutt MD    venlafaxine XR (Effexor-XR) 24 hr capsule 150 mg, 150 mg, oral, Daily with breakfast, Burton Honeycutt MD, 150 mg at 10/14/23 1003     Physical Exam  Constitutional:       Appearance: He is underweight.   HENT:      Head: Normocephalic and atraumatic.   Eyes:      General: No scleral icterus.     Extraocular Movements: Extraocular movements intact.   Cardiovascular:      Rate and Rhythm: Normal rate.      Pulses: Normal pulses.      Heart sounds: Murmur heard.      Systolic murmur is present.   Pulmonary:      Effort: Pulmonary effort is normal.      Breath sounds: Normal breath sounds.   Abdominal:      General: Abdomen is flat. Bowel sounds are normal.      Palpations: Abdomen is soft.      Tenderness: There is no abdominal tenderness.   Musculoskeletal:      Cervical back: Full passive  range of motion without pain and neck supple.      Comments: AV fistula in place in left upper extremity, no signs of infection.   Skin:     General: Skin is warm and dry.      Capillary Refill: Capillary refill takes less than 2 seconds.   Neurological:      General: No focal deficit present.      Mental Status: He is alert and oriented to person, place, and time. Mental status is at baseline.      Motor: Weakness present.      Comments: Strength 4/5 bilaterally in upper and lower extremities. No focal deficit.    Psychiatric:         Mood and Affect: Mood is depressed. Affect is flat and tearful.         Speech: Speech normal. Speech is not rapid and pressured.         Behavior: Behavior is withdrawn. Behavior is cooperative.          Assessment/Plan    Principal Problem:    Weakness  Active Problems:    ESRD on dialysis (CMS/HCC)    Macrocytic anemia    A John Nyhan is a 72-year-old male with a past medical history of ESRD on hemodialysis who presented to the [facility] ED from his dialysis facility due to the complaint of generalized weakness for approximately 3 weeks.  Reduced appetite however no other complaints at this time.  Per chart review, patient has had previous admissions for similar complaints requiring placement for rehab.     #Generalized Weakness, fatigue  #ESRD on HD (Monday, Wednesday, Friday)  #Macrocytic Anemia  #Malnutrition  -Right renal mass measuring up to 3.4 cm is suspicious for renal carcinoma. - No metastatic disease is noted. Urology onboard recommending nephrectomy.   -During admission, Vascular consulted for right innominate vein stent being occluded, no intervention at this time as there is adequate collateral drainage.  Plan:  Nephrology on consultation for HD - MWF.  Palliative consulted to discuss his goals of care given his ESRD and newly diagnosed renal mass.   Hospice meeting today @1:30pm  PT/OT in place    #Depression  #Anxiety  - Psychiatry increasing his Effexor to 150 mg given  he is clinically depressed.   - No SI or HI at this time. No imminent risk of suicide per psychiatry.   - Melatonin for sleep.  - He will follow up out patient with psych.     #Elevated troponins  #Hx CAD  - Trop 123, 120 and then 105. Likely elevated in the setting of ESRD  - EKG without any evidence of acute ischemia  - Patient denies any chest pain shortness of breath.  No concern for ACS at this time  - Cardiology consulted from the ED, no recommendations at this time.  - We will continue to monitor.     #Bipolar disorder  #Essential hypertension  - Effexor increased to 125 mg, added Trazone and xanax prn for anxiety per psych     DVT prophylaxis: Patient is low risk for DVT, no chemical prophylaxis at this time  Consults: Psych, Urology, Nephro, Vascular (signed off), PT/OT and social work   Diet: Renal  CODE STATUS: Full code  Contact: Family member, brother     Dispo: Suspect patient will likely require placement with potential need for assisted living after discharge. Social work consulted. He is not willing to receive much assistance, declining Meals on Wheels and other food resources. He would just like to have someone cook for him. Palliative on board, hospice meeting this afternoon     Arias Sultana DO  Internal Medicine, PGY-3    Rd Bianchi DO  Internal Medicine Team

## 2023-10-14 NOTE — CARE PLAN
Problem: Nutrition  Goal: Less than 5 days NPO/clear liquids  Outcome: Progressing  Goal: Oral intake greater than 50%  Outcome: Progressing  Goal: Oral intake greater 75%  Outcome: Progressing  Goal: Consume prescribed supplement  Outcome: Progressing  Goal: Adequate PO fluid intake  Outcome: Progressing  Goal: Nutrition support goals are met within 48 hrs  Outcome: Progressing  Goal: Nutrition support is meeting 75% of nutrient needs  Outcome: Progressing  Goal: Tube feed tolerance  Outcome: Progressing  Goal: BG  mg/dL  Outcome: Progressing  Goal: Lab values WNL  Outcome: Progressing  Goal: Electrolytes WNL  Outcome: Progressing  Goal: Promote healing  Outcome: Progressing  Goal: Maintain stable weight  Outcome: Progressing  Goal: Reduce weight from edema/fluid  Outcome: Progressing  Goal: Gradual weight gain  Outcome: Progressing  Goal: Improve ostomy output  Outcome: Progressing     Problem: Pain - Adult  Goal: Verbalizes/displays adequate comfort level or baseline comfort level  Outcome: Progressing     Problem: Safety - Adult  Goal: Free from fall injury  Outcome: Progressing     Problem: Discharge Planning  Goal: Discharge to home or other facility with appropriate resources  Outcome: Progressing     Problem: Chronic Conditions and Co-morbidities  Goal: Patient's chronic conditions and co-morbidity symptoms are monitored and maintained or improved  Outcome: Progressing     Problem: Skin  Goal: Decreased wound size/increased tissue granulation at next dressing change  Outcome: Progressing  Goal: Participates in plan/prevention/treatment measures  Outcome: Progressing  Goal: Prevent/manage excess moisture  Outcome: Progressing  Goal: Prevent/minimize sheer/friction injuries  Outcome: Progressing  Goal: Promote/optimize nutrition  Outcome: Progressing  Goal: Promote skin healing  Outcome: Progressing     Problem: Fall/Injury  Goal: Verbalize understanding of personal risk factors for fall in the  hospital  Outcome: Progressing  Goal: Verbalize understanding of risk factor reduction measures to prevent injury from fall in the home  Outcome: Progressing  Goal: Use assistive devices by end of the shift  Outcome: Progressing  Goal: Pace activities to prevent fatigue by end of the shift  Outcome: Progressing

## 2023-10-15 ENCOUNTER — HOME HEALTH ADMISSION (OUTPATIENT)
Dept: HOME HEALTH SERVICES | Facility: HOME HEALTH | Age: 73
End: 2023-10-15
Payer: MEDICARE

## 2023-10-15 VITALS
HEART RATE: 95 BPM | WEIGHT: 128 LBS | RESPIRATION RATE: 18 BRPM | BODY MASS INDEX: 19.4 KG/M2 | HEIGHT: 68 IN | TEMPERATURE: 97.5 F | SYSTOLIC BLOOD PRESSURE: 139 MMHG | DIASTOLIC BLOOD PRESSURE: 87 MMHG | OXYGEN SATURATION: 100 %

## 2023-10-15 PROBLEM — F33.1 MODERATE EPISODE OF RECURRENT MAJOR DEPRESSIVE DISORDER (MULTI): Status: ACTIVE | Noted: 2023-10-15

## 2023-10-15 LAB
ANION GAP SERPL CALC-SCNC: 18 MMOL/L (ref 10–20)
BUN SERPL-MCNC: 37 MG/DL (ref 6–23)
CALCIUM SERPL-MCNC: 10.9 MG/DL (ref 8.6–10.3)
CHLORIDE SERPL-SCNC: 99 MMOL/L (ref 98–107)
CO2 SERPL-SCNC: 26 MMOL/L (ref 21–32)
CREAT SERPL-MCNC: 4.44 MG/DL (ref 0.5–1.3)
ERYTHROCYTE [DISTWIDTH] IN BLOOD BY AUTOMATED COUNT: 14.1 % (ref 11.5–14.5)
GFR SERPL CREATININE-BSD FRML MDRD: 13 ML/MIN/1.73M*2
GLUCOSE SERPL-MCNC: 71 MG/DL (ref 74–99)
HCT VFR BLD AUTO: 36.4 % (ref 41–52)
HGB BLD-MCNC: 11 G/DL (ref 13.5–17.5)
MCH RBC QN AUTO: 33 PG (ref 26–34)
MCHC RBC AUTO-ENTMCNC: 30.2 G/DL (ref 32–36)
MCV RBC AUTO: 109 FL (ref 80–100)
NRBC BLD-RTO: 0 /100 WBCS (ref 0–0)
PLATELET # BLD AUTO: 100 X10*3/UL (ref 150–450)
PMV BLD AUTO: 11.5 FL (ref 7.5–11.5)
POTASSIUM SERPL-SCNC: 5 MMOL/L (ref 3.5–5.3)
RBC # BLD AUTO: 3.33 X10*6/UL (ref 4.5–5.9)
SODIUM SERPL-SCNC: 138 MMOL/L (ref 136–145)
WBC # BLD AUTO: 6.2 X10*3/UL (ref 4.4–11.3)

## 2023-10-15 PROCEDURE — 85027 COMPLETE CBC AUTOMATED: CPT

## 2023-10-15 PROCEDURE — 36415 COLL VENOUS BLD VENIPUNCTURE: CPT

## 2023-10-15 PROCEDURE — 80048 BASIC METABOLIC PNL TOTAL CA: CPT

## 2023-10-15 PROCEDURE — 96372 THER/PROPH/DIAG INJ SC/IM: CPT | Performed by: STUDENT IN AN ORGANIZED HEALTH CARE EDUCATION/TRAINING PROGRAM

## 2023-10-15 PROCEDURE — 99239 HOSP IP/OBS DSCHRG MGMT >30: CPT

## 2023-10-15 PROCEDURE — 2500000001 HC RX 250 WO HCPCS SELF ADMINISTERED DRUGS (ALT 637 FOR MEDICARE OP): Performed by: STUDENT IN AN ORGANIZED HEALTH CARE EDUCATION/TRAINING PROGRAM

## 2023-10-15 PROCEDURE — 97530 THERAPEUTIC ACTIVITIES: CPT | Mod: GP

## 2023-10-15 PROCEDURE — 2500000004 HC RX 250 GENERAL PHARMACY W/ HCPCS (ALT 636 FOR OP/ED): Performed by: PSYCHIATRY & NEUROLOGY

## 2023-10-15 PROCEDURE — 97116 GAIT TRAINING THERAPY: CPT | Mod: GP

## 2023-10-15 PROCEDURE — 2500000004 HC RX 250 GENERAL PHARMACY W/ HCPCS (ALT 636 FOR OP/ED): Performed by: STUDENT IN AN ORGANIZED HEALTH CARE EDUCATION/TRAINING PROGRAM

## 2023-10-15 PROCEDURE — 2500000001 HC RX 250 WO HCPCS SELF ADMINISTERED DRUGS (ALT 637 FOR MEDICARE OP): Performed by: INTERNAL MEDICINE

## 2023-10-15 PROCEDURE — 97535 SELF CARE MNGMENT TRAINING: CPT | Mod: GO

## 2023-10-15 RX ORDER — TRAZODONE HYDROCHLORIDE 50 MG/1
50 TABLET ORAL NIGHTLY
Qty: 30 TABLET | Refills: 0 | Status: SHIPPED | OUTPATIENT
Start: 2023-10-15 | End: 2023-11-14

## 2023-10-15 RX ORDER — VENLAFAXINE HYDROCHLORIDE 150 MG/1
150 CAPSULE, EXTENDED RELEASE ORAL
Qty: 30 CAPSULE | Refills: 0 | Status: SHIPPED | OUTPATIENT
Start: 2023-10-16 | End: 2023-11-15

## 2023-10-15 RX ORDER — ACETAMINOPHEN 500 MG
5 TABLET ORAL NIGHTLY
Qty: 30 TABLET | Refills: 0 | Status: SHIPPED | OUTPATIENT
Start: 2023-10-15 | End: 2023-11-14

## 2023-10-15 RX ADMIN — HEPARIN SODIUM 5000 UNITS: 5000 INJECTION INTRAVENOUS; SUBCUTANEOUS at 09:57

## 2023-10-15 RX ADMIN — CALCIUM ACETATE 667 MG: 667 CAPSULE ORAL at 10:17

## 2023-10-15 RX ADMIN — HEPARIN SODIUM 5000 UNITS: 5000 INJECTION INTRAVENOUS; SUBCUTANEOUS at 02:08

## 2023-10-15 RX ADMIN — VENLAFAXINE HYDROCHLORIDE 150 MG: 75 CAPSULE, EXTENDED RELEASE ORAL at 09:52

## 2023-10-15 RX ADMIN — Medication 1 CAPSULE: at 09:52

## 2023-10-15 RX ADMIN — BUPROPION HYDROCHLORIDE 200 MG: 100 TABLET, FILM COATED, EXTENDED RELEASE ORAL at 09:52

## 2023-10-15 ASSESSMENT — COGNITIVE AND FUNCTIONAL STATUS - GENERAL
STANDING UP FROM CHAIR USING ARMS: A LITTLE
DAILY ACTIVITIY SCORE: 20
TOILETING: A LITTLE
DRESSING REGULAR LOWER BODY CLOTHING: A LITTLE
CLIMB 3 TO 5 STEPS WITH RAILING: A LOT
HELP NEEDED FOR BATHING: A LITTLE
HELP NEEDED FOR BATHING: A LITTLE
WALKING IN HOSPITAL ROOM: A LITTLE
TOILETING: A LITTLE
MOBILITY SCORE: 19
MOVING TO AND FROM BED TO CHAIR: A LITTLE
STANDING UP FROM CHAIR USING ARMS: A LITTLE
DRESSING REGULAR UPPER BODY CLOTHING: A LITTLE
TURNING FROM BACK TO SIDE WHILE IN FLAT BAD: A LITTLE
MOBILITY SCORE: 18
DAILY ACTIVITIY SCORE: 19
CLIMB 3 TO 5 STEPS WITH RAILING: A LOT
WALKING IN HOSPITAL ROOM: A LITTLE
MOVING TO AND FROM BED TO CHAIR: A LITTLE
DRESSING REGULAR LOWER BODY CLOTHING: A LITTLE
DRESSING REGULAR UPPER BODY CLOTHING: A LITTLE
PERSONAL GROOMING: A LITTLE

## 2023-10-15 ASSESSMENT — PAIN - FUNCTIONAL ASSESSMENT
PAIN_FUNCTIONAL_ASSESSMENT: 0-10

## 2023-10-15 ASSESSMENT — PAIN SCALES - GENERAL
PAINLEVEL_OUTOF10: 0 - NO PAIN

## 2023-10-15 ASSESSMENT — ACTIVITIES OF DAILY LIVING (ADL): HOME_MANAGEMENT_TIME_ENTRY: 28

## 2023-10-15 NOTE — PROGRESS NOTES
"Physical Therapy    Physical Therapy Treatment    Patient Name: John W Nyhan  MRN: [id number]  Today's Date: 10/15/2023  Time Calculation  Start Time: 1305  Stop Time: 1328  Time Calculation (min): 23 min       Assessment/Plan   PT Assessment  PT Assessment Results: Decreased endurance  Rehab Prognosis: Good  Evaluation/Treatment Tolerance: Patient tolerated treatment well, Patient limited by fatigue  Medical Staff Made Aware: Yes  End of Session Communication:  (Spoke with nursing student entering his room.)  End of Session Patient Position: Alarm on, Up in chair     PT Plan  Treatment/Interventions: Bed mobility, Transfer training, Gait training, Stair training, Balance training, Neuromuscular re-education, Endurance training, Therapeutic exercise, Therapeutic activity  PT Plan: Skilled PT  PT Frequency: 3 times per week  PT Discharge Recommendations: Low intensity level of continued care  Equipment Recommended upon Discharge: Wheeled walker  PT Recommended Transfer Status: Contact guard      General Visit Information:   PT  Visit  Response to Previous Treatment: Patient reporting fatigue but able to participate.  General  Reason for Referral: Weakness (End stage renal failure)  Prior to Session Communication: Bedside nurse (Appropriate for therapy)  Patient Position Received: Alarm on, Up in chair  General Comment:  (Patient stated , \" I'm tired , but I'll go for a walk again .\" Patient stated , \" I do feel better today than when I first arrived .\")    Subjective   Precautions:  Precautions  Medical Precautions: Fall precautions  Precautions Comment:  (Patient at risk for falls due to lack of safety with AD . He does don't remain inside the walker at all times , and tries to move it aside while going into bathroom, and  sink. Max vc provided to stay inside FWW for safety . \)  Vital Signs:       Objective   Pain:  Pain Assessment  Pain Assessment: 0-10  Pain Score: 0 - No pain  Cognition:  Cognition  Overall " Cognitive Status: Within Functional Limits  Attention: Within Functional Limits  Postural Control:     Extremity/Trunk Assessments:                    Activity Tolerance:  Activity Tolerance  Endurance: Tolerates less than 10 min exercise, no significant change in vital signs  Activity Tolerance Comments:  (Patient is observably fatique post treatment .)  Treatments:                                     Ambulation/Gait Training  Ambulation/Gait Training Performed: Yes (Patient ambulated 250ft. with FWW ( 2 turns ) CGA . He displayed safe pace , but decreased step height . min vc for increased step height to reduce risk of falls.)  Transfers  Transfer: Yes  Transfer 1  Technique 1: Sit to stand, Stand to sit  Transfer Device 1: Walker  Transfer Level of Assistance 1: Contact guard  Trials/Comments 1: x 4 (Patient displayed safety while tranfersing to / from FWW ( pushing up from where seated , and reaching back for arm rests ).)                     Outcome Measures:             [facility] Basic Mobility  Turning from your back to your side while in a flat bed without using bedrails: None  Moving from lying on your back to sitting on the side of a flat bed without using bedrails: None  Moving to and from bed to chair (including a wheelchair): A little  Standing up from a chair using your arms (e.g. wheelchair or bedside chair): A little  To walk in hospital room: A little  Climbing 3-5 steps with railing: A lot  Basic Mobility - Total Score: 19                                                                   Education Documentation  No documentation found.  Education Comments  No comments found.        OP EDUCATION:  Education  Individual(s) Educated: Patient  Education Provided:  (Safety with use of AD)  Patient Response to Education: Patient/Caregiver Verbalized Understanding of Information  Education Comment:  (Patient required max vc for remaining inside walker at all times .)    Encounter Problems       Encounter  Problems (Active)       PT Problem       Pt will be able to perform all bed mobility tasks with Mod I.  (Progressing)       Start:  10/12/23    Expected End:  10/14/23            Pt will perform all transfers with Mod I and FWW with proper safety mechanics.   (Progressing)       Start:  10/12/23    Expected End:  10/14/23            Pt will ambulate 300ft with Mod I using FWW for improved functional independence.  (Progressing)       Start:  10/12/23    Expected End:  10/14/23            Pt will be able to negotiate 2 steps without HR with Mod I.  (Progressing)       Start:  10/12/23    Expected End:  10/14/23            Pt will demonstrate at least 4+/5 BLE strength for ease with functional mobility.   (Progressing)       Start:  10/12/23    Expected End:  10/14/23                   Pain - Adult

## 2023-10-15 NOTE — PROGRESS NOTES
Occupational Therapy    OT Treatment    Patient Name: John W Nyhan  MRN: [id number]  Today's Date: 10/15/2023  Time Calculation  Start Time: 1050  Stop Time: 1118  Time Calculation (min): 28 min         Assessment:  End of Session Communication: Bedside nurse  End of Session Patient Position: Up in chair, Alarm on       Plan:  OT Frequency: 3 times per week  OT Discharge Recommendations: Low intensity level of continued care     Subjective     Current Problem:  1. Weakness        2. Undernutrition        3. ESRD (end stage renal disease) (CMS/[facility])        4. Moderate episode of recurrent major depressive disorder (CMS/[facility])        5. At increased risk for social isolation        6. Financial difficulties        7. Elevated troponin            General:  OT Received On: 10/15/23  Prior to Session Communication: Bedside nurse  Patient Position Received:  (EOB)  General Comment: Pt pleasant and cooperative.    Vital Signs:       Pain:  Pain Assessment  Pain Assessment: 0-10  Pain Score: 0 - No pain  Objective      Activities of Daily Living:    Grooming  Grooming Level of Assistance: Close supervision  Grooming Where Assessed: Standing sinkside  Grooming Comments: cue for AD placement at sink           LE Dressing  LE Dressing: Yes  Sock Level of Assistance: Setup, Distant supervision  LE Dressing Where Assessed: Chair  LE Dressing Comments:  (Pt completed LB dressing at edge of chair with figure four method, verbalzied understanding of safe seated dressing.)           Bed Mobility/Transfers:    Transfer 1  Transfer From 1: Sit to  Transfer to 1: Stand  Transfer Device 1: Walker, Gait belt  Transfer Level of Assistance 1: Close supervision  Trials/Comments 1:  (Pt compelted multiple sit<>stand from bed and chair SBA, FM performed within household distances bed>sink>chair>hallway>chair SBA with fww.)               Outcome Measures:[facility] Daily Activity  Putting on and taking off regular lower body clothing: A little  Bathing  (including washing, rinsing, drying): A little  Putting on and taking off regular upper body clothing: A little  Toileting, which includes using toilet, bedpan or urinal: A little  Taking care of personal grooming such as brushing teeth: None  Eating Meals: None  Daily Activity - Total Score: 20  Education Documentation  ADL Training, taught by TERESA Burden at 10/15/2023 11:37 AM.  Learner: Patient  Readiness: Acceptance  Method: Demonstration, Explanation  Response: Demonstrated Understanding, Verbalizes Understanding    Education Comments  No comments found.        EDUCATION:  Education  Individual(s) Educated: Patient  Patient Response to Education: Patient/Caregiver Verbalized Understanding of Information    Goals:  Encounter Problems       Encounter Problems (Active)       OT Goals       OT Goal 1 (Progressing)       Start:  10/12/23    Expected End:  10/14/23       Pt will complete all bed mobility with independence safely           OT Goal 2 (Progressing)       Start:  10/12/23    Expected End:  10/14/23       Pt with complete all transfers safely with modified independence           OT Goal 3 (Progressing)       Start:  10/12/23    Expected End:  10/14/23       Pt will complete ADL's and mobility with good sit balance and fair+ stand balance           OT Goal 4 (Progressing)       Start:  10/12/23    Expected End:  10/14/23       Pt with complete grooming ADL's with modified independence and fair+ stand balance           OT Goal 5 (Progressing)       Start:  10/12/23    Expected End:  10/14/23       Pt will complete UB dressing ADL's independently.

## 2023-10-15 NOTE — CARE PLAN
Problem: Nutrition  Goal: Less than 5 days NPO/clear liquids  Outcome: Progressing  Goal: Oral intake greater than 50%  Outcome: Progressing  Goal: Oral intake greater 75%  Outcome: Progressing  Goal: Consume prescribed supplement  Outcome: Progressing  Goal: Adequate PO fluid intake  Outcome: Progressing  Goal: Nutrition support goals are met within 48 hrs  Outcome: Progressing  Goal: Nutrition support is meeting 75% of nutrient needs  Outcome: Progressing  Goal: Tube feed tolerance  Outcome: Progressing  Goal: BG  mg/dL  Outcome: Progressing  Goal: Lab values WNL  Outcome: Progressing  Goal: Electrolytes WNL  Outcome: Progressing  Goal: Promote healing  Outcome: Progressing  Goal: Maintain stable weight  Outcome: Progressing  Goal: Reduce weight from edema/fluid  Outcome: Progressing  Goal: Gradual weight gain  Outcome: Progressing  Goal: Improve ostomy output  Outcome: Progressing     Problem: Pain - Adult  Goal: Verbalizes/displays adequate comfort level or baseline comfort level  Outcome: Progressing     Problem: Safety - Adult  Goal: Free from fall injury  Outcome: Progressing     Problem: Discharge Planning  Goal: Discharge to home or other facility with appropriate resources  Outcome: Progressing     Problem: Chronic Conditions and Co-morbidities  Goal: Patient's chronic conditions and co-morbidity symptoms are monitored and maintained or improved  Outcome: Progressing     Problem: Skin  Goal: Decreased wound size/increased tissue granulation at next dressing change  Outcome: Progressing  Goal: Participates in plan/prevention/treatment measures  Outcome: Progressing  Goal: Prevent/manage excess moisture  Outcome: Progressing  Goal: Prevent/minimize sheer/friction injuries  Outcome: Progressing  Goal: Promote/optimize nutrition  Outcome: Progressing  Goal: Promote skin healing  Outcome: Progressing     Problem: Fall/Injury  Goal: Verbalize understanding of personal risk factors for fall in the  hospital  Outcome: Progressing  Goal: Verbalize understanding of risk factor reduction measures to prevent injury from fall in the home  Outcome: Progressing  Goal: Use assistive devices by end of the shift  Outcome: Progressing  Goal: Pace activities to prevent fatigue by end of the shift  Outcome: Progressing     Problem: Safety  Goal: Patient will be injury free during hospitalization  Outcome: Progressing  Goal: I will remain free of falls  Outcome: Progressing     Problem: Daily Care  Goal: Daily care needs are met  Outcome: Progressing     Problem: Psychosocial Needs  Goal: Demonstrates ability to cope with hospitalization/illness  Outcome: Progressing  Goal: Collaborate with me, my family, and caregiver to identify my specific goals  Outcome: Progressing     Problem: Discharge Barriers  Goal: My discharge needs are met  Outcome: Progressing

## 2023-10-15 NOTE — DISCHARGE SUMMARY
Discharge Diagnosis  Weakness    Issues Requiring Follow-Up  Please follow up with urology to monitor renal mass   Set up PT/OT and  for you to help you gain your strength and help with your medical needs  Recommend following up with your primary care physician for chronic additions  Return to the hospital if having symptoms of chest pain, shortness of breath or lightheadedness    Discharge Meds     Your medication list        START taking these medications        Instructions Last Dose Given Next Dose Due   melatonin 5 mg tablet      Take 1 tablet (5 mg) by mouth once daily at bedtime.       traZODone 50 mg tablet  Commonly known as: Desyrel      Take 1 tablet (50 mg) by mouth once daily at bedtime.              CHANGE how you take these medications        Instructions Last Dose Given Next Dose Due   venlafaxine  mg 24 hr capsule  Commonly known as: Effexor-XR  Start taking on: October 16, 2023  What changed:   medication strength  how much to take  when to take this  additional instructions  Another medication with the same name was removed. Continue taking this medication, and follow the directions you see here.      Take 1 capsule (150 mg) by mouth once daily with a meal. Do not crush or chew. Do not start before October 16, 2023.              CONTINUE taking these medications        Instructions Last Dose Given Next Dose Due   B complex-vitamin C-folic acid 0.8 mg tablet  Commonly known as: Nephro-Kobi           buPROPion  mg 12 hr tablet  Commonly known as: Wellbutrin SR           calcium acetate 667 mg capsule  Commonly known as: Phoslo           divalproex 250 mg 24 hr tablet  Commonly known as: Depakote ER           midodrine 10 mg tablet  Commonly known as: Proamatine                     Where to Get Your Medications        These medications were sent to [id number] - [place], [place] - [address] [address] [facility]  [address] [address] [address]      Phone:  [phone]   melatonin 5 mg tablet  traZODone 50 mg tablet  venlafaxine  mg 24 hr capsule         Test Results Pending At Discharge  Pending Labs       No current pending labs.            Hospital Course  A 73 y.o. male with history of end-stage renal disease on hemodialysis, hypertension, hyperlipidemia and coronary artery disease who is admitted with generalized weakness.  The emergency room, vitally stable.  Labs on the day of admission revealed hemoglobin of 11.0 (baseline) and elevated creatinine 2.8 (history of ESRD on dialysis). Chest x-ray without any acute cardiopulmonary process.  Elevated troponins 123 that down trended to 120.  Cardiology was consulted in the ED due to elevated troponins however recommended no interventions at that time.  Given patient reported that he had weakness and weight loss CTA was ordered to rule out malignancy. CT chest/abdomen/pelvis noted to have a 3.5 cm renal mass.  Urology was consulted, recommended ideally be laparoscopic right-sided radical nephrectomy.  However, patient reported that he did not want any invasive procedure and would like to monitor. 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 Per vascular notes, images were reviewed which revealed that stent occlusion was likely a chronic finding and not acute no further workup needed, given there is no symptoms. Patient denies arm or facial swelling.  Nephrology was consulted during hospital stay for hemodialysis requirements. Psych was also consulted given patient was depressed.  Adjusted some of his medications increased Effexor to 150 daily and added trazodone and Xanax for anxiety.  During hospital stay, palliative was also consulted and a hospice meeting was held. Ultimately patient stated he did not think he could sign with hospice as he felt this was against his [employer] believe. Patient has malnutrition and weakness; nutrition, social work and  PT OT were consulted during the admission.  Patient was given multiple resources such as offered Meals on Wheels however patient refused.  PT and OT evaluated patient and recommended low intensity.  Patient was discharged with outpatient medical social work and PT/OT.     Pertinent Physical Exam At Time of Discharge  Constitutional:       Appearance: He is underweight.   HENT:      Head: Normocephalic and atraumatic.   Eyes:      General: No scleral icterus.     Extraocular Movements: Extraocular movements intact.   Cardiovascular:      Rate and Rhythm: Normal rate.      Pulses: Normal pulses.      Heart sounds: Murmur heard.      Systolic murmur is present.   Pulmonary:      Effort: Pulmonary effort is normal.      Breath sounds: Normal breath sounds.   Abdominal:      General: Abdomen is flat. Bowel sounds are normal.      Palpations: Abdomen is soft.      Tenderness: There is no abdominal tenderness.   Musculoskeletal:      Cervical back: Full passive range of motion without pain and neck supple.      Comments: AV fistula in place in left upper extremity, no signs of infection.   Skin:     General: Skin is warm and dry.      Capillary Refill: Capillary refill takes less than 2 seconds.   Neurological:      General: No focal deficit present.      Mental Status: He is alert and oriented to person, place, and time. Mental status is at baseline.      Motor: Weakness present.      Comments: Strength 4/5 bilaterally in upper and lower extremities. No focal deficit.    Psychiatric:         Mood and Affect: Mood is depressed. Affect is flat and tearful.         Speech: Speech normal. Speech is not rapid and pressured.         Behavior: Behavior is withdrawn. Behavior is cooperative.     Outpatient Follow-Up  No future appointments.      Rd Bianchi DO

## 2023-10-15 NOTE — CARE PLAN
The patient's goals for the shift include      The clinical goals for the shift include   Problem: Nutrition  Goal: Less than 5 days NPO/clear liquids  10/15/2023 0353 by Anaid Remy RN  Outcome: Progressing  10/15/2023 0350 by Anaid Remy RN  Outcome: Progressing  Goal: Oral intake greater than 50%  10/15/2023 0353 by Anaid Remy RN  Outcome: Progressing  10/15/2023 0351 by Anaid Remy RN  Outcome: Progressing  10/15/2023 0350 by Anaid Remy RN  Outcome: Progressing  Goal: Oral intake greater 75%  10/15/2023 0351 by Anaid Remy RN  Outcome: Not met  10/15/2023 0350 by Anaid Remy RN  Outcome: Progressing  Goal: Consume prescribed supplement  10/15/2023 0351 by Anaid Remy RN  Outcome: Not met  10/15/2023 0350 by Anaid Remy RN  Outcome: Progressing  Goal: Adequate PO fluid intake  10/15/2023 0353 by Anaid Remy RN  Outcome: Progressing  10/15/2023 0351 by Anaid Remy RN  Outcome: Progressing  10/15/2023 0350 by Anaid Remy RN  Outcome: Progressing  Goal: Nutrition support goals are met within 48 hrs  10/15/2023 0351 by Anaid Remy RN  Outcome: Not met  10/15/2023 0350 by Anaid Remy RN  Outcome: Progressing  Goal: Nutrition support is meeting 75% of nutrient needs  10/15/2023 0353 by Anaid Remy RN  Outcome: Progressing  10/15/2023 0350 by Anaid Remy RN  Outcome: Progressing  Goal: Tube feed tolerance  10/15/2023 0351 by Anaid Remy RN  Outcome: Not met  10/15/2023 0350 by Anaid Remy RN  Outcome: Progressing  Goal: BG  mg/dL  10/15/2023 0351 by Anaid Remy RN  Outcome: Not met  10/15/2023 0350 by Anaid Remy RN  Outcome: Progressing  Goal: Lab values WNL  10/15/2023 0353 by Anaid Remy RN  Outcome: Progressing  10/15/2023 0351 by Anaid Remy RN  Outcome: Progressing  10/15/2023 0350 by Anaid Remy RN  Outcome: Progressing  Goal: Electrolytes WNL  10/15/2023 0353 by Anaid Remy RN  Outcome: Progressing  10/15/2023  0351 by Anaid Remy RN  Outcome: Progressing  10/15/2023 0350 by nAaid Remy RN  Outcome: Progressing  Goal: Promote healing  10/15/2023 0351 by Anaid Remy RN  Outcome: Not met  10/15/2023 0350 by Anaid Remy RN  Outcome: Progressing  Goal: Maintain stable weight  10/15/2023 0351 by Anaid Remy RN  Outcome: Not met  10/15/2023 0350 by Anaid Remy RN  Outcome: Progressing  Goal: Reduce weight from edema/fluid  10/15/2023 0351 by Anaid Remy RN  Outcome: Not met  10/15/2023 0350 by Anaid Remy RN  Outcome: Progressing  Goal: Gradual weight gain  10/15/2023 0351 by Anaid Remy RN  Outcome: Not met  10/15/2023 0350 by Anaid Remy RN  Outcome: Progressing  Goal: Improve ostomy output  10/15/2023 0351 by Anaid Remy RN  Outcome: Not met  10/15/2023 0350 by Anaid Remy RN  Outcome: Progressing

## 2023-10-15 NOTE — DISCHARGE INSTRUCTIONS
Please follow up with urology to monitor renal mass   Set up PT/OT and HHC services for you to help you gain your strength and help with your medical needs  Recommend following up with your primary care physician for chronic additions  Return to the hospital if having symptoms of chest pain, shortness of breath or lightheadedness

## 2023-10-15 NOTE — PROGRESS NOTES
Met with patient at bedside. Patient states he used [facility] agency in the past and would like to use same agency when he returns home. Referral sent. Dr. Weems to follow for orders as patient states his PCP with [facility] has retired and he does not have a new PCP yet. Will need to send final orders. Patient requesting transport be arranged to [facility] in [place] as that is where his car is and his cane is also in his car. [facility] and nursing aware.     1239- Home care orders sent to [facility]. [facility] can accept.     Ciara Aviles RN

## 2023-10-16 ENCOUNTER — HOSPITAL ENCOUNTER (OUTPATIENT)
Dept: CARDIOLOGY | Facility: HOSPITAL | Age: 73
Discharge: HOME | End: 2023-10-16
Payer: MEDICARE

## 2023-10-16 LAB
ATRIAL RATE: 84 BPM
P AXIS: 42 DEGREES
P OFFSET: 177 MS
P ONSET: 146 MS
PR INTERVAL: 132 MS
Q ONSET: 212 MS
QRS COUNT: 13 BEATS
QRS DURATION: 94 MS
QT INTERVAL: 426 MS
QTC CALCULATION(BAZETT): 503 MS
QTC FREDERICIA: 476 MS
R AXIS: -64 DEGREES
T AXIS: -43 DEGREES
T OFFSET: 425 MS
VENTRICULAR RATE: 84 BPM

## 2023-10-16 PROCEDURE — 93005 ELECTROCARDIOGRAM TRACING: CPT

## 2023-10-16 PROCEDURE — 93010 ELECTROCARDIOGRAM REPORT: CPT | Performed by: INTERNAL MEDICINE

## 2023-10-17 ENCOUNTER — HOSPITAL ENCOUNTER (OUTPATIENT)
Dept: CARDIOLOGY | Facility: HOSPITAL | Age: 73
Discharge: HOME | End: 2023-10-17
Payer: MEDICARE

## 2023-10-17 PROCEDURE — 93010 ELECTROCARDIOGRAM REPORT: CPT | Performed by: INTERNAL MEDICINE

## 2023-10-17 PROCEDURE — 93005 ELECTROCARDIOGRAM TRACING: CPT

## 2025-04-03 ENCOUNTER — HOSPITAL ENCOUNTER (OUTPATIENT)
Facility: HOSPITAL | Age: 75
Setting detail: OBSERVATION
End: 2025-04-03
Attending: EMERGENCY MEDICINE | Admitting: STUDENT IN AN ORGANIZED HEALTH CARE EDUCATION/TRAINING PROGRAM
Payer: COMMERCIAL

## 2025-04-03 DIAGNOSIS — N18.6 ESRD ON DIALYSIS (MULTI): Chronic | ICD-10-CM

## 2025-04-03 DIAGNOSIS — R62.7 FAILURE TO THRIVE IN ADULT: Primary | ICD-10-CM

## 2025-04-03 DIAGNOSIS — Z99.2 ESRD ON DIALYSIS (MULTI): Chronic | ICD-10-CM

## 2025-04-03 LAB
ANION GAP SERPL CALC-SCNC: 18 MMOL/L (ref 10–20)
APPEARANCE UR: CLEAR
BASOPHILS # BLD AUTO: 0.03 X10*3/UL (ref 0–0.1)
BASOPHILS NFR BLD AUTO: 0.3 %
BILIRUB UR STRIP.AUTO-MCNC: NEGATIVE MG/DL
BUN SERPL-MCNC: 36 MG/DL (ref 6–23)
CALCIUM SERPL-MCNC: 9.6 MG/DL (ref 8.6–10.3)
CHLORIDE SERPL-SCNC: 92 MMOL/L (ref 98–107)
CO2 SERPL-SCNC: 33 MMOL/L (ref 21–32)
COLOR UR: ABNORMAL
CREAT SERPL-MCNC: 4.6 MG/DL (ref 0.5–1.3)
EGFRCR SERPLBLD CKD-EPI 2021: 13 ML/MIN/1.73M*2
EOSINOPHIL # BLD AUTO: 0.07 X10*3/UL (ref 0–0.4)
EOSINOPHIL NFR BLD AUTO: 0.7 %
ERYTHROCYTE [DISTWIDTH] IN BLOOD BY AUTOMATED COUNT: 13.7 % (ref 11.5–14.5)
GLUCOSE SERPL-MCNC: 115 MG/DL (ref 74–99)
GLUCOSE UR STRIP.AUTO-MCNC: NORMAL MG/DL
HCT VFR BLD AUTO: 38.2 % (ref 41–52)
HGB BLD-MCNC: 12.1 G/DL (ref 13.5–17.5)
IMM GRANULOCYTES # BLD AUTO: 0.03 X10*3/UL (ref 0–0.5)
IMM GRANULOCYTES NFR BLD AUTO: 0.3 % (ref 0–0.9)
KETONES UR STRIP.AUTO-MCNC: NEGATIVE MG/DL
LEUKOCYTE ESTERASE UR QL STRIP.AUTO: ABNORMAL
LYMPHOCYTES # BLD AUTO: 2.27 X10*3/UL (ref 0.8–3)
LYMPHOCYTES NFR BLD AUTO: 24.3 %
MCH RBC QN AUTO: 33.9 PG (ref 26–34)
MCHC RBC AUTO-ENTMCNC: 31.7 G/DL (ref 32–36)
MCV RBC AUTO: 107 FL (ref 80–100)
MONOCYTES # BLD AUTO: 0.75 X10*3/UL (ref 0.05–0.8)
MONOCYTES NFR BLD AUTO: 8 %
NEUTROPHILS # BLD AUTO: 6.21 X10*3/UL (ref 1.6–5.5)
NEUTROPHILS NFR BLD AUTO: 66.4 %
NITRITE UR QL STRIP.AUTO: NEGATIVE
NRBC BLD-RTO: 0 /100 WBCS (ref 0–0)
PH UR STRIP.AUTO: 8 [PH]
PLATELET # BLD AUTO: 113 X10*3/UL (ref 150–450)
POTASSIUM SERPL-SCNC: 5 MMOL/L (ref 3.5–5.3)
PROT UR STRIP.AUTO-MCNC: ABNORMAL MG/DL
RBC # BLD AUTO: 3.57 X10*6/UL (ref 4.5–5.9)
RBC # UR STRIP.AUTO: ABNORMAL MG/DL
RBC #/AREA URNS AUTO: ABNORMAL /HPF
SODIUM SERPL-SCNC: 138 MMOL/L (ref 136–145)
SP GR UR STRIP.AUTO: 1.01
UROBILINOGEN UR STRIP.AUTO-MCNC: NORMAL MG/DL
WBC # BLD AUTO: 9.4 X10*3/UL (ref 4.4–11.3)
WBC #/AREA URNS AUTO: ABNORMAL /HPF

## 2025-04-03 PROCEDURE — 36415 COLL VENOUS BLD VENIPUNCTURE: CPT | Performed by: EMERGENCY MEDICINE

## 2025-04-03 PROCEDURE — G0378 HOSPITAL OBSERVATION PER HR: HCPCS

## 2025-04-03 PROCEDURE — 99285 EMERGENCY DEPT VISIT HI MDM: CPT | Performed by: PHYSICIAN ASSISTANT

## 2025-04-03 PROCEDURE — 80048 BASIC METABOLIC PNL TOTAL CA: CPT | Performed by: EMERGENCY MEDICINE

## 2025-04-03 PROCEDURE — 81001 URINALYSIS AUTO W/SCOPE: CPT | Performed by: EMERGENCY MEDICINE

## 2025-04-03 PROCEDURE — 99222 1ST HOSP IP/OBS MODERATE 55: CPT

## 2025-04-03 PROCEDURE — 2500000005 HC RX 250 GENERAL PHARMACY W/O HCPCS

## 2025-04-03 PROCEDURE — 87086 URINE CULTURE/COLONY COUNT: CPT | Mod: STJLAB | Performed by: EMERGENCY MEDICINE

## 2025-04-03 PROCEDURE — 96372 THER/PROPH/DIAG INJ SC/IM: CPT

## 2025-04-03 PROCEDURE — 2500000001 HC RX 250 WO HCPCS SELF ADMINISTERED DRUGS (ALT 637 FOR MEDICARE OP)

## 2025-04-03 PROCEDURE — 2500000004 HC RX 250 GENERAL PHARMACY W/ HCPCS (ALT 636 FOR OP/ED)

## 2025-04-03 PROCEDURE — 85025 COMPLETE CBC W/AUTO DIFF WBC: CPT | Performed by: EMERGENCY MEDICINE

## 2025-04-03 PROCEDURE — 99285 EMERGENCY DEPT VISIT HI MDM: CPT | Performed by: EMERGENCY MEDICINE

## 2025-04-03 RX ORDER — BUPROPION HYDROCHLORIDE 100 MG/1
200 TABLET, EXTENDED RELEASE ORAL DAILY
Status: DISPENSED | OUTPATIENT
Start: 2025-04-04

## 2025-04-03 RX ORDER — PREDNISOLONE ACETATE 10 MG/ML
1 SUSPENSION/ DROPS OPHTHALMIC 4 TIMES DAILY
Status: ON HOLD | COMMUNITY

## 2025-04-03 RX ORDER — POLYETHYLENE GLYCOL 3350 17 G/17G
17 POWDER, FOR SOLUTION ORAL DAILY
Status: DISPENSED | OUTPATIENT
Start: 2025-04-03

## 2025-04-03 RX ORDER — HEPARIN SODIUM 5000 [USP'U]/ML
5000 INJECTION, SOLUTION INTRAVENOUS; SUBCUTANEOUS EVERY 8 HOURS SCHEDULED
Status: DISPENSED | OUTPATIENT
Start: 2025-04-03

## 2025-04-03 RX ORDER — VENLAFAXINE 37.5 MG/1
37.5 TABLET ORAL DAILY
Status: DISPENSED | OUTPATIENT
Start: 2025-04-03

## 2025-04-03 RX ORDER — VENLAFAXINE 37.5 MG/1
37.5 TABLET ORAL DAILY
Status: ON HOLD | COMMUNITY

## 2025-04-03 RX ORDER — DIVALPROEX SODIUM 250 MG/1
250 TABLET, FILM COATED, EXTENDED RELEASE ORAL NIGHTLY
Status: DISPENSED | OUTPATIENT
Start: 2025-04-03

## 2025-04-03 RX ORDER — MULTIVITAMIN WITH FOLIC ACID 400 MCG
1 TABLET ORAL DAILY
Status: ON HOLD | COMMUNITY

## 2025-04-03 RX ORDER — PREDNISOLONE ACETATE 10 MG/ML
1 SUSPENSION/ DROPS OPHTHALMIC 4 TIMES DAILY
Status: DISPENSED | OUTPATIENT
Start: 2025-04-03

## 2025-04-03 RX ADMIN — HEPARIN SODIUM 5000 UNITS: 5000 INJECTION, SOLUTION INTRAVENOUS; SUBCUTANEOUS at 22:24

## 2025-04-03 RX ADMIN — PREDNISOLONE ACETATE 1 DROP: 10 SUSPENSION/ DROPS OPHTHALMIC at 22:22

## 2025-04-03 RX ADMIN — PREDNISOLONE ACETATE 1 DROP: 10 SUSPENSION/ DROPS OPHTHALMIC at 18:23

## 2025-04-03 RX ADMIN — DIVALPROEX SODIUM 250 MG: 250 TABLET, EXTENDED RELEASE ORAL at 22:23

## 2025-04-03 RX ADMIN — HEPARIN SODIUM 5000 UNITS: 5000 INJECTION, SOLUTION INTRAVENOUS; SUBCUTANEOUS at 17:40

## 2025-04-03 SDOH — ECONOMIC STABILITY: HOUSING INSECURITY: IN THE PAST 12 MONTHS, HOW MANY TIMES HAVE YOU MOVED WHERE YOU WERE LIVING?: 0

## 2025-04-03 SDOH — SOCIAL STABILITY: SOCIAL INSECURITY: WITHIN THE LAST YEAR, HAVE YOU BEEN HUMILIATED OR EMOTIONALLY ABUSED IN OTHER WAYS BY YOUR PARTNER OR EX-PARTNER?: NO

## 2025-04-03 SDOH — SOCIAL STABILITY: SOCIAL INSECURITY
WITHIN THE LAST YEAR, HAVE YOU BEEN RAPED OR FORCED TO HAVE ANY KIND OF SEXUAL ACTIVITY BY YOUR PARTNER OR EX-PARTNER?: NO

## 2025-04-03 SDOH — ECONOMIC STABILITY: FOOD INSECURITY: WITHIN THE PAST 12 MONTHS, THE FOOD YOU BOUGHT JUST DIDN'T LAST AND YOU DIDN'T HAVE MONEY TO GET MORE.: NEVER TRUE

## 2025-04-03 SDOH — ECONOMIC STABILITY: FOOD INSECURITY: HOW HARD IS IT FOR YOU TO PAY FOR THE VERY BASICS LIKE FOOD, HOUSING, MEDICAL CARE, AND HEATING?: NOT VERY HARD

## 2025-04-03 SDOH — SOCIAL STABILITY: SOCIAL INSECURITY: DO YOU FEEL UNSAFE GOING BACK TO THE PLACE WHERE YOU ARE LIVING?: NO

## 2025-04-03 SDOH — ECONOMIC STABILITY: HOUSING INSECURITY: IN THE LAST 12 MONTHS, WAS THERE A TIME WHEN YOU WERE NOT ABLE TO PAY THE MORTGAGE OR RENT ON TIME?: NO

## 2025-04-03 SDOH — ECONOMIC STABILITY: FOOD INSECURITY: WITHIN THE PAST 12 MONTHS, YOU WORRIED THAT YOUR FOOD WOULD RUN OUT BEFORE YOU GOT THE MONEY TO BUY MORE.: NEVER TRUE

## 2025-04-03 SDOH — ECONOMIC STABILITY: HOUSING INSECURITY: AT ANY TIME IN THE PAST 12 MONTHS, WERE YOU HOMELESS OR LIVING IN A SHELTER (INCLUDING NOW)?: NO

## 2025-04-03 SDOH — SOCIAL STABILITY: SOCIAL INSECURITY: WERE YOU ABLE TO COMPLETE ALL THE BEHAVIORAL HEALTH SCREENINGS?: NO

## 2025-04-03 SDOH — SOCIAL STABILITY: SOCIAL INSECURITY: WITHIN THE LAST YEAR, HAVE YOU BEEN AFRAID OF YOUR PARTNER OR EX-PARTNER?: NO

## 2025-04-03 SDOH — SOCIAL STABILITY: SOCIAL INSECURITY: ABUSE: ADULT

## 2025-04-03 SDOH — SOCIAL STABILITY: SOCIAL INSECURITY: DOES ANYONE TRY TO KEEP YOU FROM HAVING/CONTACTING OTHER FRIENDS OR DOING THINGS OUTSIDE YOUR HOME?: NO

## 2025-04-03 SDOH — ECONOMIC STABILITY: INCOME INSECURITY: IN THE PAST 12 MONTHS HAS THE ELECTRIC, GAS, OIL, OR WATER COMPANY THREATENED TO SHUT OFF SERVICES IN YOUR HOME?: NO

## 2025-04-03 SDOH — SOCIAL STABILITY: SOCIAL INSECURITY: HAVE YOU HAD ANY THOUGHTS OF HARMING ANYONE ELSE?: NO

## 2025-04-03 SDOH — SOCIAL STABILITY: SOCIAL INSECURITY: ARE YOU OR HAVE YOU BEEN THREATENED OR ABUSED PHYSICALLY, EMOTIONALLY, OR SEXUALLY BY ANYONE?: NO

## 2025-04-03 SDOH — ECONOMIC STABILITY: TRANSPORTATION INSECURITY: IN THE PAST 12 MONTHS, HAS LACK OF TRANSPORTATION KEPT YOU FROM MEDICAL APPOINTMENTS OR FROM GETTING MEDICATIONS?: NO

## 2025-04-03 SDOH — SOCIAL STABILITY: SOCIAL INSECURITY: HAS ANYONE EVER THREATENED TO HURT YOUR FAMILY OR YOUR PETS?: NO

## 2025-04-03 SDOH — SOCIAL STABILITY: SOCIAL INSECURITY: HAVE YOU HAD THOUGHTS OF HARMING ANYONE ELSE?: NO

## 2025-04-03 SDOH — SOCIAL STABILITY: SOCIAL INSECURITY: ARE THERE ANY APPARENT SIGNS OF INJURIES/BEHAVIORS THAT COULD BE RELATED TO ABUSE/NEGLECT?: NO

## 2025-04-03 SDOH — SOCIAL STABILITY: SOCIAL INSECURITY: DO YOU FEEL ANYONE HAS EXPLOITED OR TAKEN ADVANTAGE OF YOU FINANCIALLY OR OF YOUR PERSONAL PROPERTY?: NO

## 2025-04-03 ASSESSMENT — COGNITIVE AND FUNCTIONAL STATUS - GENERAL
DRESSING REGULAR UPPER BODY CLOTHING: A LITTLE
PATIENT BASELINE BEDBOUND: NO
WALKING IN HOSPITAL ROOM: A LITTLE
MOVING TO AND FROM BED TO CHAIR: A LITTLE
PERSONAL GROOMING: A LITTLE
MOVING TO AND FROM BED TO CHAIR: A LITTLE
MOBILITY SCORE: 19
WALKING IN HOSPITAL ROOM: A LITTLE
MOBILITY SCORE: 19
STANDING UP FROM CHAIR USING ARMS: A LITTLE
DRESSING REGULAR LOWER BODY CLOTHING: A LITTLE
TOILETING: A LITTLE
PERSONAL GROOMING: A LITTLE
DAILY ACTIVITIY SCORE: 22
CLIMB 3 TO 5 STEPS WITH RAILING: A LOT
TOILETING: A LITTLE
HELP NEEDED FOR BATHING: A LITTLE
STANDING UP FROM CHAIR USING ARMS: A LITTLE
DAILY ACTIVITIY SCORE: 19
CLIMB 3 TO 5 STEPS WITH RAILING: A LOT

## 2025-04-03 ASSESSMENT — PAIN DESCRIPTION - LOCATION: LOCATION: ABDOMEN

## 2025-04-03 ASSESSMENT — ACTIVITIES OF DAILY LIVING (ADL)
GROOMING: INDEPENDENT
WALKS IN HOME: NEEDS ASSISTANCE
ADEQUATE_TO_COMPLETE_ADL: YES
LACK_OF_TRANSPORTATION: NO
HEARING - LEFT EAR: FUNCTIONAL
TOILETING: INDEPENDENT
HEARING - RIGHT EAR: FUNCTIONAL
LACK_OF_TRANSPORTATION: NO
DRESSING YOURSELF: INDEPENDENT
JUDGMENT_ADEQUATE_SAFELY_COMPLETE_DAILY_ACTIVITIES: YES
GROOMING: INDEPENDENT
ADEQUATE_TO_COMPLETE_ADL: YES
HEARING - RIGHT EAR: FUNCTIONAL
LACK_OF_TRANSPORTATION: NO
TOILETING: NEEDS ASSISTANCE
WALKS IN HOME: INDEPENDENT
DRESSING YOURSELF: INDEPENDENT
PATIENT'S MEMORY ADEQUATE TO SAFELY COMPLETE DAILY ACTIVITIES?: YES
PATIENT'S MEMORY ADEQUATE TO SAFELY COMPLETE DAILY ACTIVITIES?: YES
BATHING: INDEPENDENT
FEEDING YOURSELF: INDEPENDENT
BATHING: NEEDS ASSISTANCE
FEEDING YOURSELF: INDEPENDENT
LACK_OF_TRANSPORTATION: NO
JUDGMENT_ADEQUATE_SAFELY_COMPLETE_DAILY_ACTIVITIES: YES

## 2025-04-03 ASSESSMENT — PATIENT HEALTH QUESTIONNAIRE - PHQ9
1. LITTLE INTEREST OR PLEASURE IN DOING THINGS: NEARLY EVERY DAY
SUM OF ALL RESPONSES TO PHQ9 QUESTIONS 1 & 2: 6
2. FEELING DOWN, DEPRESSED OR HOPELESS: NEARLY EVERY DAY

## 2025-04-03 ASSESSMENT — LIFESTYLE VARIABLES
AUDIT-C TOTAL SCORE: 0
HOW OFTEN DO YOU HAVE 6 OR MORE DRINKS ON ONE OCCASION: NEVER
SKIP TO QUESTIONS 9-10: 1
AUDIT-C TOTAL SCORE: 0
PRESCIPTION_ABUSE_PAST_12_MONTHS: NO
SUBSTANCE_ABUSE_PAST_12_MONTHS: NO
HOW OFTEN DO YOU HAVE A DRINK CONTAINING ALCOHOL: NEVER
HOW MANY STANDARD DRINKS CONTAINING ALCOHOL DO YOU HAVE ON A TYPICAL DAY: PATIENT DOES NOT DRINK

## 2025-04-03 ASSESSMENT — COLUMBIA-SUICIDE SEVERITY RATING SCALE - C-SSRS
1. IN THE PAST MONTH, HAVE YOU WISHED YOU WERE DEAD OR WISHED YOU COULD GO TO SLEEP AND NOT WAKE UP?: NO
2. HAVE YOU ACTUALLY HAD ANY THOUGHTS OF KILLING YOURSELF?: NO
6. HAVE YOU EVER DONE ANYTHING, STARTED TO DO ANYTHING, OR PREPARED TO DO ANYTHING TO END YOUR LIFE?: NO

## 2025-04-03 ASSESSMENT — PAIN - FUNCTIONAL ASSESSMENT: PAIN_FUNCTIONAL_ASSESSMENT: 0-10

## 2025-04-03 ASSESSMENT — PAIN DESCRIPTION - PAIN TYPE: TYPE: ACUTE PAIN

## 2025-04-03 ASSESSMENT — PAIN SCALES - GENERAL
PAINLEVEL_OUTOF10: 0 - NO PAIN
PAINLEVEL_OUTOF10: 2

## 2025-04-03 NOTE — ED PROVIDER NOTES
"Emergency Department Provider Note        History of Present Illness     History provided by: Patient  Limitations to History: None  External Records Reviewed with Brief Summary: None    HPI:  John W Nyhan is a 74 y.o. male with history of anemia, ESRD, depression who presents to the ER today with chief complaint of hernia however when I enter the room to ask patient what brings him in today he burst into tears, patient tells me that he just does not feel like he is able to care for himself anymore.  Patient states he never  or had any kids, states that all of his family is far away, states that he lives alone in an apartment.  States 2 years ago he was at a nursing home, [place] [facility] and states that he hated it so he signed himself out.  Since then he has been living in an apartment.  Patient has ESRD and takes himself to dialysis Monday Wednesdays and Fridays, he last went to dialysis yesterday and completed it.  He denies any medical complaints at this time.  Patient states he has a condition where his tear ducts are not draining appropriately and he has an overproduction of tears and watery eyes saw his eye doctor on Tuesday put him on prednisone eyedrops.  Patient states that he is not able to put the eyedrops in his eyes which made him realize that he really should not be living alone and is unable to care for himself.  Patient states he get Meals on Wheels 4 days a week, and has a home health aide that comes twice a month, she helps him with laundry and grocery shopping but otherwise he essentially fend for himself.  Patient walks with a cane, denies any problems getting around, had denies any recent falls.  Patient does have a hernia but states has had it for a long time, it is not causing him any pain at this time.  Despite telling me all this patient states that he \"really does not want to go to a nursing home\".    Physical Exam   Triage vitals:  T 37.5 °C (99.5 °F)  HR 91  /86  RR (!) 22  O2 " 97 % None (Room air)    Physical Exam  Vitals and nursing note reviewed.   Constitutional:       Appearance: Normal appearance. He is not toxic-appearing.      Comments: Patient tearful, crying, visibly upset.  Denies suicidal or homicidal ideation.   HENT:      Head: Normocephalic and atraumatic.      Nose: Nose normal.   Eyes:      Extraocular Movements: Extraocular movements intact.   Cardiovascular:      Rate and Rhythm: Normal rate and regular rhythm.   Pulmonary:      Effort: Pulmonary effort is normal.   Abdominal:      Palpations: Abdomen is soft.   Genitourinary:     Comments: Right groin hernia is present, nontender, no erythema, easily reducible.  Musculoskeletal:         General: Normal range of motion.      Cervical back: Normal range of motion and neck supple.   Skin:     General: Skin is warm and dry.   Neurological:      General: No focal deficit present.      Mental Status: He is alert.   Psychiatric:         Mood and Affect: Mood normal.         Thought Content: Thought content normal.        No orders to display     Labs Reviewed   CBC WITH AUTO DIFFERENTIAL - Abnormal       Result Value    WBC 9.4      nRBC 0.0      RBC 3.57 (*)     Hemoglobin 12.1 (*)     Hematocrit 38.2 (*)      (*)     MCH 33.9      MCHC 31.7 (*)     RDW 13.7      Platelets 113 (*)     Neutrophils % 66.4      Immature Granulocytes %, Automated 0.3      Lymphocytes % 24.3      Monocytes % 8.0      Eosinophils % 0.7      Basophils % 0.3      Neutrophils Absolute 6.21 (*)     Immature Granulocytes Absolute, Automated 0.03      Lymphocytes Absolute 2.27      Monocytes Absolute 0.75      Eosinophils Absolute 0.07      Basophils Absolute 0.03     BASIC METABOLIC PANEL - Abnormal    Glucose 115 (*)     Sodium 138      Potassium 5.0      Chloride 92 (*)     Bicarbonate 33 (*)     Anion Gap 18      Urea Nitrogen 36 (*)     Creatinine 4.60 (*)     eGFR 13 (*)     Calcium 9.6     URINALYSIS WITH REFLEX CULTURE AND MICROSCOPIC     Narrative:     The following orders were created for panel order Urinalysis with Reflex Culture and Microscopic.  Procedure                               Abnormality         Status                     ---------                               -----------         ------                     Urinalysis with Reflex C...[[id number]]                                                 Extra Urine Gray Tube[[id number]]                                                         Please view results for these tests on the individual orders.   URINALYSIS WITH REFLEX CULTURE AND MICROSCOPIC   EXTRA URINE GRAY TUBE     ED Course as of 25 1512   Thu 2025   1349 Patient requires placement per social work, recommending admission due to inability to care for self. [JW]      ED Course User Index  [JW] Maria Dolores Nguyễn MD         Diagnoses as of 25 151   Failure to thrive in adult         Medical Decision Making & ED Course   Medical Decision Makin y.o. male presents today for evaluation of a right groin hernia however when I speak with the patient this is not the actual reason he is here, his hernia is not bothering him, it is not warm or red, is easily reducible, what is bothering him is that he is not able to put in eyedrops that he was prescribed on Tuesday and is caused him to become very upset feeling like he can no longer care for himself independently.  Given no medical complaints I did not order any labs initially, social work was consulted to talk with patient about options such as long-term care versus home health.    Ultimately social work did recommend placement which patient was agreeable to, basic labs were obtained since patient will need admission prior to placement.  Patient admitted to medical team for further evaluation and management.  ----      Differential diagnoses considered include but are not limited to: social work, hernia, incarceration, strangulation     Social Determinants of Health which  Significantly Impact Care: Poor housing conditions and Problems related to living alone The following actions were taken to address these social determinants: Patient offered evaluation by Social Work    EKG Independent Interpretation: EKG not obtained    Independent Result Review and Interpretation: Relevant laboratory and radiographic results were reviewed and independently interpreted by myself.  As necessary, they are commented on in the ED Course.    Chronic conditions affecting the patient's care: As documented above in [facility]    The patient was discussed with the following consultants/services: Hospitalist/Admitting Provider who accepted the patient for admission and  Courtney    Care Considerations: As documented above in [facility]    ED Course:  ED Course as of 04/03/25 1512   Thu Apr 03, 2025   1349 Patient requires placement per social work, recommending admission due to inability to care for self. [JW]      ED Course User Index  [JW] Maria Dolores Nguyễn MD         Diagnoses as of 04/03/25 1512   Failure to thrive in adult     Disposition   As a result of their workup, the patient will require admission to the hospital.  The patient was informed of his diagnosis.  The patient was given the opportunity to ask questions and I answered them. The patient agreed to be admitted to the hospital.    Procedures   Procedures    This was a shared visit with an ED attending.  The patient was seen and discussed with the ED attending    Tanisha Silva PA-C  Emergency Medicine       Tanisha Silva PA-C  04/03/25 1512

## 2025-04-03 NOTE — PROGRESS NOTES
04/03/25 1504   Discharge Planning   Living Arrangements Alone   Support Systems /  (SRS program)   Type of Residence Private residence   Who is requesting discharge planning? Provider   Home or Post Acute Services Post acute facilities (Rehab/SNF/etc)   Type of Post Acute Facility Services Skilled nursing   Expected Discharge Disposition SNF   Intensity of Service   Intensity of Service >30 min     Asked to see pt in the ED. Pt is from home alone and came to the ED because he does not he feel can continue care for himself at home alone. Pt is an ESRD pt and receives dialysis at [facility]. Pt reports everything is becoming more difficult for him. He is unable to manage is eye drops and reports he recently burned himself while preparing food. Pt appeared very frail and overwhelmed with is care. He reports he is active with the SRS program and receives assistance 2x month.  He has no other supports available. We discussed a referral to The AL waiver program. Pt agreed, but does not feel he is able to return home and handle his care. We discussed a short term placement until we can get him assessed by the AL waiver. Pt agreed and would like a referral to [facility] since he has been there in the past. Referral placed AL waiver program. SW to follow for NF placement. PT/OT evals are pending. Will send referral to [facility] once therapy evals are complete.

## 2025-04-03 NOTE — H&P
Internal Medicine    Admission H&P      Patient John W Nyhan PCP No primary care provider on file.    MRN [id number]  Admission Date 4/3/2025      Chief Complaint/Reason for Admission:  Shlomo is a 74 y.o. male who presented today with trouble taking care of himself at home    Subjective    Subjective   History of Presenting Illness  Mr. John W Nyhan is a 74 y.o. male with PMH significant for ESRD on HD MWF secondary to remote hx of Lithium use, HTN, HLD, CAD, bipolar disorder, gout, and aortic stenosis who presented to [facility] ED from home with initial complaint of R inguinal hernia. Upon further interviewing, however, patient revealed to ED provider that he is having trouble taking care of himself at home and would like some help with placement to a nursing home. Patient seen and examined at bedside in the emergency department with attending physician and is a good historian.  Patient reports he realized he needs more help caring for himself when he had a trouble applying eyedrops due to hand tremors.  He is also been having trouble cooking meals.  He gets Meals on Wheels 4 times a week and has a home health aide who helps him with laundry and grocery shopping that comes twice a month.  He lives in his apartment alone and ambulates with a cane at baseline.  Otherwise, he is not having any acute symptoms.  His right inguinal hernia has been present for multiple months and is not causing him any pain.  He received dialysis yesterday and follows with Dr. Weems.  He denies any headache, blurred vision, chest pain, shortness of breath, nausea, vomiting, abdominal pain, recent falls, or BLE edema. Patient is agreeable to going to [facility].     ED course:  VS: /86  Pulse 91  Temp 37.5 °C  SpO2 97% on RA  RR 22  Labs: Unremarkable CBC without leukocytosis, H&H stable around patient's baseline of 11-12, CMP shows creatinine 4.6 with GFR of 13  Imaging: None  Intervention: Social work was consulted from the  emergency room and actively involved    Patient was admitted to [facility] under teaching for PT/OT evaluation and placement to SNF.    Past Medical History  Active Ambulatory Problems     Diagnosis Date Noted    Weakness 10/11/2023    ESRD on dialysis (Multi) 10/11/2023    Macrocytic anemia 10/11/2023    Moderate episode of recurrent major depressive disorder 10/15/2023     Resolved Ambulatory Problems     Diagnosis Date Noted    No Resolved Ambulatory Problems     Past Medical History:   Diagnosis Date    Bipolar disorder, unspecified (Multi)     End stage renal disease (Multi) 05/27/2015       Past Surgical History   Past Surgical History:   Procedure Laterality Date    OTHER SURGICAL HISTORY  05/27/2015    Creation Of AV Fistula - Nonautogenous Graft    TONSILLECTOMY  05/27/2015    Tonsillectomy With Adenoidectomy       Social History  Never a smoker. No alcohol or recreational drug use.    Home Medication:  Prior to Admission medications    Medication Sig Start Date End Date Taking? Authorizing Provider   buPROPion SR (Wellbutrin SR) 200 mg 12 hr tablet Take 1 tablet (200 mg) by mouth once daily. Do not crush, chew, or split.    Historical Provider, MD   calcium acetate (Phoslo) 667 mg capsule Take 1 capsule (667 mg) by mouth 3 times a day with meals.    Historical Provider, MD   divalproex (Depakote ER) 250 mg 24 hr tablet Take 1 tablet (250 mg) by mouth once daily at bedtime. Do not crush, chew, or split.    Historical Provider, MD   midodrine (Proamatine) 10 mg tablet Take 1 tablet (10 mg) by mouth once daily as needed (for low BP).    Historical Provider, MD   traZODone (Desyrel) 50 mg tablet Take 1 tablet (50 mg) by mouth once daily at bedtime. 10/15/23 11/14/23  Rd Bianchi DO   venlafaxine XR (Effexor-XR) 150 mg 24 hr capsule Take 1 capsule (150 mg) by mouth once daily with a meal. Do not crush or chew. Do not start before October 16, 2023. 10/16/23 11/15/23  Rd Bianchi DO   B complex-vitamin C-folic  "acid (Nephro-Kobi) 0.8 mg tablet Take 1 tablet by mouth once daily.  4/3/25  Historical Provider, MD        Family History  No relevant family history.    Allergies:  No Known Allergies     Review of System:  12-system ROS negative except as documented in HPI    Objective    Objective   Vital Signs:  Visit Vitals  /80   Pulse 94   Temp 37.5 °C (99.5 °F) (Temporal)   Resp 19   Ht 1.727 m (5' 8\")   Wt 56.2 kg (124 lb)   SpO2 97%   BMI 18.85 kg/m²   Smoking Status Never   BSA 1.64 m²        Physical Examination:  General: A&Ox4, alert, coopertive, not in acute distress  HEENT: Normocephalic, atraumatic, EOMI, moist mucous membranes  Neck: Neck supple, trachea midline, no evidence of trauma  Cardiovascular: RRR, S1 & S2 appreciated, no murmurs, rubs, or gallops appreciated, distal pulses 2+ bilaterally (radial and dorsalis pedis)  Respiratory: CTAB, no respiratory distress, no wheezing, rales or rhonchi, on RA  Abdomen: Soft, nondistended, nontender to palpation, +bowel sounds, no guarding rigidity or rebound tenderness, reducible right inguinal hernia that is not tender or erythematous  MSK: No joint swelling, tremors of both hands  Neuro: No focal deficits, normal motor function, normal sensation, follows all commands. No asterixis noted  Skin: Warm and dry, no lesions, contusions, or erythema.  Psychiatric: Judgment intact.  Appropriate mood, affect and behavior.    Laboratory Data:  Results for orders placed or performed during the hospital encounter of 04/03/25 (from the past 24 hours)   CBC and Auto Differential   Result Value Ref Range    WBC 9.4 4.4 - 11.3 x10*3/uL    nRBC 0.0 0.0 - 0.0 /100 WBCs    RBC 3.57 (L) 4.50 - 5.90 x10*6/uL    Hemoglobin 12.1 (L) 13.5 - 17.5 g/dL    Hematocrit 38.2 (L) 41.0 - 52.0 %     (H) 80 - 100 fL    MCH 33.9 26.0 - 34.0 pg    MCHC 31.7 (L) 32.0 - 36.0 g/dL    RDW 13.7 11.5 - 14.5 %    Platelets 113 (L) 150 - 450 x10*3/uL    Neutrophils % 66.4 40.0 - 80.0 %    Immature " Granulocytes %, Automated 0.3 0.0 - 0.9 %    Lymphocytes % 24.3 13.0 - 44.0 %    Monocytes % 8.0 2.0 - 10.0 %    Eosinophils % 0.7 0.0 - 6.0 %    Basophils % 0.3 0.0 - 2.0 %    Neutrophils Absolute 6.21 (H) 1.60 - 5.50 x10*3/uL    Immature Granulocytes Absolute, Automated 0.03 0.00 - 0.50 x10*3/uL    Lymphocytes Absolute 2.27 0.80 - 3.00 x10*3/uL    Monocytes Absolute 0.75 0.05 - 0.80 x10*3/uL    Eosinophils Absolute 0.07 0.00 - 0.40 x10*3/uL    Basophils Absolute 0.03 0.00 - 0.10 x10*3/uL   Basic metabolic panel   Result Value Ref Range    Glucose 115 (H) 74 - 99 mg/dL    Sodium 138 136 - 145 mmol/L    Potassium 5.0 3.5 - 5.3 mmol/L    Chloride 92 (L) 98 - 107 mmol/L    Bicarbonate 33 (H) 21 - 32 mmol/L    Anion Gap 18 10 - 20 mmol/L    Urea Nitrogen 36 (H) 6 - 23 mg/dL    Creatinine 4.60 (H) 0.50 - 1.30 mg/dL    eGFR 13 (L) >60 mL/min/1.73m*2    Calcium 9.6 8.6 - 10.3 mg/dL       Imaging:  Imaging  No results found.    Cardiology, Vascular, and Other Imaging  No other imaging results found for the past 2 days       Medications:  Scheduled medications    Continuous medications    PRN medications    Assessment    Assessment & Plan   Mr. John W Nyhan is a 74 y.o. male with PMH significant for ESRD on HD MWF secondary to remote hx of Lithium use, HTN, HLD, CAD, bipolar disorder, gout, and aortic stenosis who presented to [facility] ED from home on 4/30/2025 with difficulty taking care of himself at home.  HDS on arrival to the ED today.  Labs fairly unremarkable.  Social work consulted from the ED and recommended placement.  Patient is agreeable to going to [facility] where he has been previously.  He was admitted to [facility] under teaching for PT/OT evaluation and placement to SNF.    Assessment & Plan    #Failure to thrive  #Right inguinal hernia  -Medically stable  -SW involved for placement   -PT/OT consulted  -Will need referral to general surgery on discharge for R inguinal hernia     #ESRD on HD MWF from remote hx  of Lithium use (AV fistula)  -Patient of Dr. Weems and receives dialysis at [facility] on MWF  -Received HD yesterday  -Nephrology consulted for HD, appreciate recs  -Renal diet     CHRONIC PROBLEMS:  # HTN  # HLD  # CAD  # Bipolar disorder  # Gout  # Aortic stenosis   - Continue home medications as appropriate    Global Plan of Care  IVF: as needed  Electrolytes: Replete with goal K>4 and Mg>2  Diet: renal  DVT prophylaxis: subcutaneous heparin   Bowel regime: miralax  Consults: PT/OT, SW, Nephro  Code Status: FULL    Dispo: Anticipate 1-2 midnight stays pending PT/OT eval and placement     Patient seen and discussed with senior resident and attending physician.     Kristine Davis, DO  Internal Medicine, PGY 1  April 3, 2025

## 2025-04-03 NOTE — CARE PLAN
The patient's goals for the shift include pt.  want  to  feel  better    The clinical goals for the shift include pt.  wants  to  feel  better

## 2025-04-04 ENCOUNTER — APPOINTMENT (OUTPATIENT)
Dept: CARDIOLOGY | Facility: HOSPITAL | Age: 75
End: 2025-04-04
Payer: COMMERCIAL

## 2025-04-04 ENCOUNTER — APPOINTMENT (OUTPATIENT)
Dept: DIALYSIS | Facility: HOSPITAL | Age: 75
End: 2025-04-04
Payer: COMMERCIAL

## 2025-04-04 LAB
ALBUMIN SERPL BCP-MCNC: 4 G/DL (ref 3.4–5)
ANION GAP SERPL CALC-SCNC: 19 MMOL/L (ref 10–20)
BUN SERPL-MCNC: 47 MG/DL (ref 6–23)
CALCIUM SERPL-MCNC: 8.9 MG/DL (ref 8.6–10.3)
CHLORIDE SERPL-SCNC: 91 MMOL/L (ref 98–107)
CO2 SERPL-SCNC: 27 MMOL/L (ref 21–32)
CREAT SERPL-MCNC: 5.35 MG/DL (ref 0.5–1.3)
EGFRCR SERPLBLD CKD-EPI 2021: 11 ML/MIN/1.73M*2
ERYTHROCYTE [DISTWIDTH] IN BLOOD BY AUTOMATED COUNT: 13.6 % (ref 11.5–14.5)
GLUCOSE BLD MANUAL STRIP-MCNC: 89 MG/DL (ref 74–99)
GLUCOSE SERPL-MCNC: 70 MG/DL (ref 74–99)
HCT VFR BLD AUTO: 32.3 % (ref 41–52)
HGB BLD-MCNC: 10.4 G/DL (ref 13.5–17.5)
HOLD SPECIMEN: NORMAL
MAGNESIUM SERPL-MCNC: 2.27 MG/DL (ref 1.6–2.4)
MCH RBC QN AUTO: 34 PG (ref 26–34)
MCHC RBC AUTO-ENTMCNC: 32.2 G/DL (ref 32–36)
MCV RBC AUTO: 106 FL (ref 80–100)
NRBC BLD-RTO: 0 /100 WBCS (ref 0–0)
PHOSPHATE SERPL-MCNC: 5.2 MG/DL (ref 2.5–4.9)
PLATELET # BLD AUTO: 77 X10*3/UL (ref 150–450)
POTASSIUM SERPL-SCNC: 4.6 MMOL/L (ref 3.5–5.3)
RBC # BLD AUTO: 3.06 X10*6/UL (ref 4.5–5.9)
SODIUM SERPL-SCNC: 132 MMOL/L (ref 136–145)
WBC # BLD AUTO: 6.4 X10*3/UL (ref 4.4–11.3)

## 2025-04-04 PROCEDURE — 36415 COLL VENOUS BLD VENIPUNCTURE: CPT | Performed by: INTERNAL MEDICINE

## 2025-04-04 PROCEDURE — 83735 ASSAY OF MAGNESIUM: CPT

## 2025-04-04 PROCEDURE — 2500000001 HC RX 250 WO HCPCS SELF ADMINISTERED DRUGS (ALT 637 FOR MEDICARE OP): Performed by: INTERNAL MEDICINE

## 2025-04-04 PROCEDURE — 85027 COMPLETE CBC AUTOMATED: CPT

## 2025-04-04 PROCEDURE — 82947 ASSAY GLUCOSE BLOOD QUANT: CPT

## 2025-04-04 PROCEDURE — 93005 ELECTROCARDIOGRAM TRACING: CPT

## 2025-04-04 PROCEDURE — G0378 HOSPITAL OBSERVATION PER HR: HCPCS

## 2025-04-04 PROCEDURE — 97165 OT EVAL LOW COMPLEX 30 MIN: CPT | Mod: GO

## 2025-04-04 PROCEDURE — 96372 THER/PROPH/DIAG INJ SC/IM: CPT

## 2025-04-04 PROCEDURE — 87340 HEPATITIS B SURFACE AG IA: CPT | Mod: STJLAB | Performed by: INTERNAL MEDICINE

## 2025-04-04 PROCEDURE — 99232 SBSQ HOSP IP/OBS MODERATE 35: CPT

## 2025-04-04 PROCEDURE — 36415 COLL VENOUS BLD VENIPUNCTURE: CPT

## 2025-04-04 PROCEDURE — 97161 PT EVAL LOW COMPLEX 20 MIN: CPT | Mod: GP

## 2025-04-04 PROCEDURE — 2500000001 HC RX 250 WO HCPCS SELF ADMINISTERED DRUGS (ALT 637 FOR MEDICARE OP)

## 2025-04-04 PROCEDURE — 86706 HEP B SURFACE ANTIBODY: CPT | Mod: STJLAB | Performed by: INTERNAL MEDICINE

## 2025-04-04 PROCEDURE — 2500000004 HC RX 250 GENERAL PHARMACY W/ HCPCS (ALT 636 FOR OP/ED)

## 2025-04-04 PROCEDURE — 80069 RENAL FUNCTION PANEL: CPT

## 2025-04-04 RX ORDER — ONDANSETRON HYDROCHLORIDE 2 MG/ML
4 INJECTION, SOLUTION INTRAVENOUS EVERY 6 HOURS PRN
Status: ACTIVE | OUTPATIENT
Start: 2025-04-04

## 2025-04-04 RX ORDER — HYDROXYZINE HYDROCHLORIDE 25 MG/1
25 TABLET, FILM COATED ORAL ONCE
Status: COMPLETED | OUTPATIENT
Start: 2025-04-05 | End: 2025-04-04

## 2025-04-04 RX ORDER — MIDODRINE HYDROCHLORIDE 10 MG/1
10 TABLET ORAL AS NEEDED
Status: DISPENSED | OUTPATIENT
Start: 2025-04-04

## 2025-04-04 RX ADMIN — PREDNISOLONE ACETATE 1 DROP: 10 SUSPENSION/ DROPS OPHTHALMIC at 21:29

## 2025-04-04 RX ADMIN — PREDNISOLONE ACETATE 1 DROP: 10 SUSPENSION/ DROPS OPHTHALMIC at 15:01

## 2025-04-04 RX ADMIN — HEPARIN SODIUM 5000 UNITS: 5000 INJECTION, SOLUTION INTRAVENOUS; SUBCUTANEOUS at 15:01

## 2025-04-04 RX ADMIN — HEPARIN SODIUM 5000 UNITS: 5000 INJECTION, SOLUTION INTRAVENOUS; SUBCUTANEOUS at 21:47

## 2025-04-04 RX ADMIN — Medication 1 CAPSULE: at 15:00

## 2025-04-04 RX ADMIN — VENLAFAXINE 37.5 MG: 37.5 TABLET ORAL at 10:35

## 2025-04-04 RX ADMIN — BUPROPION HYDROCHLORIDE 200 MG: 100 TABLET, FILM COATED, EXTENDED RELEASE ORAL at 10:34

## 2025-04-04 RX ADMIN — PREDNISOLONE ACETATE 1 DROP: 10 SUSPENSION/ DROPS OPHTHALMIC at 06:24

## 2025-04-04 RX ADMIN — MIDODRINE HYDROCHLORIDE 10 MG: 10 TABLET ORAL at 15:01

## 2025-04-04 RX ADMIN — PREDNISOLONE ACETATE 1 DROP: 10 SUSPENSION/ DROPS OPHTHALMIC at 18:17

## 2025-04-04 RX ADMIN — POLYETHYLENE GLYCOL 3350 17 G: 17 POWDER, FOR SOLUTION ORAL at 10:34

## 2025-04-04 RX ADMIN — HEPARIN SODIUM 5000 UNITS: 5000 INJECTION, SOLUTION INTRAVENOUS; SUBCUTANEOUS at 06:21

## 2025-04-04 RX ADMIN — HYDROXYZINE HYDROCHLORIDE 25 MG: 25 TABLET, FILM COATED ORAL at 23:41

## 2025-04-04 ASSESSMENT — COGNITIVE AND FUNCTIONAL STATUS - GENERAL
DRESSING REGULAR UPPER BODY CLOTHING: A LITTLE
MOVING TO AND FROM BED TO CHAIR: A LITTLE
EATING MEALS: A LITTLE
MOVING TO AND FROM BED TO CHAIR: A LITTLE
HELP NEEDED FOR BATHING: A LITTLE
DAILY ACTIVITIY SCORE: 19
CLIMB 3 TO 5 STEPS WITH RAILING: A LITTLE
DAILY ACTIVITIY SCORE: 21
DRESSING REGULAR LOWER BODY CLOTHING: A LITTLE
TOILETING: A LITTLE
CLIMB 3 TO 5 STEPS WITH RAILING: A LITTLE
TOILETING: A LITTLE
STANDING UP FROM CHAIR USING ARMS: A LITTLE
WALKING IN HOSPITAL ROOM: A LITTLE
HELP NEEDED FOR BATHING: A LITTLE
PERSONAL GROOMING: A LITTLE
STANDING UP FROM CHAIR USING ARMS: A LITTLE
MOBILITY SCORE: 20
DRESSING REGULAR LOWER BODY CLOTHING: A LITTLE
WALKING IN HOSPITAL ROOM: A LITTLE
TOILETING: A LITTLE
MOVING TO AND FROM BED TO CHAIR: A LITTLE
PERSONAL GROOMING: A LITTLE
DRESSING REGULAR LOWER BODY CLOTHING: A LITTLE
CLIMB 3 TO 5 STEPS WITH RAILING: A LITTLE
DRESSING REGULAR UPPER BODY CLOTHING: A LITTLE
STANDING UP FROM CHAIR USING ARMS: A LITTLE
DAILY ACTIVITIY SCORE: 18
HELP NEEDED FOR BATHING: A LITTLE
WALKING IN HOSPITAL ROOM: A LITTLE
MOBILITY SCORE: 20
MOBILITY SCORE: 20

## 2025-04-04 ASSESSMENT — PAIN SCALES - GENERAL
PAINLEVEL_OUTOF10: 0 - NO PAIN

## 2025-04-04 ASSESSMENT — PAIN - FUNCTIONAL ASSESSMENT
PAIN_FUNCTIONAL_ASSESSMENT: UNABLE TO ASSESS
PAIN_FUNCTIONAL_ASSESSMENT: 0-10
PAIN_FUNCTIONAL_ASSESSMENT: 0-10

## 2025-04-04 ASSESSMENT — ACTIVITIES OF DAILY LIVING (ADL): ADL_ASSISTANCE: INDEPENDENT

## 2025-04-04 NOTE — PROGRESS NOTES
Physical Therapy    Physical Therapy Evaluation    Patient Name: John W Nyhan  MRN: [id number]  Today's Date: 4/4/2025   Time Calculation  Start Time: 0851  Stop Time: 0903  Time Calculation (min): 12 min  3135/3135-A    Assessment/Plan   PT Assessment: Pt demonstrates impairments listed below.  Pt appears below baseline level of function and based on current level of function, pt would benefit from continued skilled therapy while in the hospital to ensure safety, decrease risk of falls, and regain strength/mobility back to baseline.  Once stable enough for discharge, pt would benefit from low intensity therapy.     PT Assessment Results: Decreased strength, Decreased mobility, Impaired balance  Rehab Prognosis: Good  Evaluation/Treatment Tolerance: Patient tolerated treatment well  Medical Staff Made Aware: Yes  End of Session Communication: Bedside nurse  End of Session Patient Position: Up in chair, Alarm on  IP OR SWING BED PT PLAN  Inpatient or Swing Bed: Inpatient  PT Plan  Treatment/Interventions: Bed mobility, Transfer training, Gait training, Stair training, Balance training, Neuromuscular re-education, Strengthening, Range of motion, Therapeutic exercise, Therapeutic activity, Home exercise program  PT Plan: Ongoing PT  PT Frequency: 3 times per week  PT Discharge Recommendations: Low intensity level of continued care  Equipment Recommended upon Discharge:  (LRAD PRN)  PT Recommended Transfer Status: Stand by assist  PT - OK to Discharge: Yes - To next level of care when cleared by medical team    Subjective     Current Problem:  1. Failure to thrive in adult            Past Medical History:  Patient Active Problem List   Diagnosis    Weakness    ESRD on dialysis (Multi)    Macrocytic anemia    Moderate episode of recurrent major depressive disorder    Failure to thrive in adult       General Visit Information:  Per EMR: pt presented to [facility] ED from home with initial complaint of R inguinal hernia. Upon  further interviewing, however, patient revealed to ED provider that he is having trouble taking care of himself at home and would like some help with placement to a nursing home. Patient seen and examined at bedside in the emergency department with attending physician and is a good historian.  Patient reports he realized he needs more help caring for himself when he had a trouble applying eyedrops due to hand tremors.  He is also been having trouble cooking meals.  He gets Meals on Wheels 4 times a week and has a home health aide who helps him with laundry and grocery shopping that comes twice a month.  He lives in his apartment alone and ambulates with a cane at baseline.  Otherwise, he is not having any acute symptoms.  His right inguinal hernia has been present for multiple months and is not causing him any pain.  He received dialysis yesterday and follows with Dr. Weems.  He denies any headache, blurred vision, chest pain, shortness of breath, nausea, vomiting, abdominal pain, recent falls, or BLE edema. Patient is agreeable to going to [facility].     On arrival, pt sitting in bedside chair.  Pt in no apparent distress and agreeable to therapy.    General  Reason for Referral: impaired mobility  Referred By: Mayito Redding  Co-Treatment: OT  Prior to Session Communication: Bedside nurse  Patient Position Received: Up in chair, Alarm on    Home Living/PLOF:  Pt lives alone in apt with 1+1 MARGIE.  1 floor set up.  Has tub shower with chair and Gbs.  IND with ADLs.  Intermittent use of quad cane for mobility (mostly outdoors).  Has HHA that comes 2x/moth who assist with laundry and groceries.  Has meals on wheels 4x/week but states this is only lunches.  Pt states that he has difficulty preparing other meals and does not like the options for other meals from [facility].  {Pt drives himself to dialysis.  Pt denies any falls.     Precautions:  Precautions  Medical Precautions: Fall precautions     Objective      Pain:  Pain Assessment  Pain Assessment: 0-10  0-10 (Numeric) Pain Score: 0 - No pain    Cognition:  Cognition  Overall Cognitive Status: Within Functional Limits  Orientation Level: Oriented X4    General Assessments:      Activity Tolerance  Endurance: Endurance does not limit participation in activity  Sensation  Sensation Comment: pt reports tingling in R hand    Static Sitting Balance  Static Sitting-Comment/Number of Minutes: good  Dynamic Sitting Balance  Dynamic Sitting-Comments: good  Static Standing Balance  Static Standing-Comment/Number of Minutes: good  Dynamic Standing Balance  Dynamic Standing-Comments: fair plus    Extremity/Trunk Assessments:  BLE strength: WFL grossly     Functional Mobility:  Bed mobility  NT 2/2 sitting in bedside chair on arrival     Transfers  Sit to stand: SBA  Stand to sit: SBA    Ambulation/Stairs  Pt ambulated 250 ft with SBA without device; intermittent use of hand rail in kapadia at beginning of ambulation but improved to not needing rail; NBOS with decreased arm swing but no LOB noted     Outcome Measures:  [facility] Basic Mobility  Turning from your back to your side while in a flat bed without using bedrails: None  Moving from lying on your back to sitting on the side of a flat bed without using bedrails: None  Moving to and from bed to chair (including a wheelchair): A little  Standing up from a chair using your arms (e.g. wheelchair or bedside chair): A little  To walk in hospital room: A little  Climbing 3-5 steps with railing: A little  Basic Mobility - Total Score: 20    Goals:  Encounter Problems       Encounter Problems (Active)       PT Problem       Pt will be able to perform all bed mobility tasks with Mod I.  (Progressing)       Start:  04/04/25    Expected End:  04/18/25            Pt will perform all transfers with Mod I and LRAD PRN with proper safety mechanics.   (Progressing)       Start:  04/04/25    Expected End:  04/18/25            Pt will ambulate 250  ft with Mod I using LRAD PRN for improved functional independence.  (Progressing)       Start:  04/04/25    Expected End:  04/18/25            Pt will be able to negotiate 2 steps with 1 HR with Mod I.  (Progressing)       Start:  04/04/25    Expected End:  04/18/25                 Education Documentation  Body Mechanics, taught by Pina Clinton, PT at 4/4/2025 12:10 PM.  Learner: Patient  Readiness: Acceptance  Method: Explanation  Response: Verbalizes Understanding    Home Exercise Program, taught by Pina Clinton, PT at 4/4/2025 12:10 PM.  Learner: Patient  Readiness: Acceptance  Method: Explanation  Response: Verbalizes Understanding    Mobility Training, taught by Pina Clinton, PT at 4/4/2025 12:10 PM.  Learner: Patient  Readiness: Acceptance  Method: Explanation  Response: Verbalizes Understanding    Education Comments  No comments found.

## 2025-04-04 NOTE — PROGRESS NOTES
John W Nyhan is a 74 y.o. male on day 0 of admission presenting with Failure to thrive in adult.      Subjective   Acute events overnight.  Patient hemodynamically stable.  Seen and examined this morning at bedside.  Resting comfortably in bed in no apparent distress.  Patient denies any complaints at this time.  Patient states he does not have any living relatives within the area that are able to help care for him and he has had limitations living at home by himself.       Objective     Last Recorded Vitals  /63 (BP Location: Right arm, Patient Position: Lying)   Pulse 72   Temp 36 °C (96.8 °F) (Temporal)   Resp 17   Wt 56 kg (123 lb 7.3 oz)   SpO2 98%   Intake/Output last 3 Shifts:    Intake/Output Summary (Last 24 hours) at 4/4/2025 1135  Last data filed at 4/4/2025 0628  Gross per 24 hour   Intake 240 ml   Output 400 ml   Net -160 ml       Admission Weight  Weight: 56.2 kg (124 lb) (04/03/25 1048)    Daily Weight  04/03/25 : 56 kg (123 lb 7.3 oz)    Image Results  ECG 12 lead  Normal sinus rhythm  Left axis deviation  Inferior infarct (cited on or before 11-OCT-2023)  Possible Anterolateral infarct , age undetermined  Prolonged QT  Abnormal ECG  When compared with ECG of 09-FEB-2023 16:59,  Significant changes have occurred  Confirmed by Ezekiel Lewis ([id number]) on 10/17/2023 4:47:24 PM      Physical Exam    General: Awake, alert/oriented x4, well developed, no acute distress  Head: Atraumatic/Normocephalic  Eyes: Normal external exam  ENT: moist mucous membranes  Cardiovascular: RRR, S1/S2, no murmurs, rubs, or gallops, radial pulses +2, no edema of extremities  Pulmonary: CTAB, no respiratory distress. No wheezes, rales, or ronchi  Abdomen: +BS, soft, non-tender.  Right inguinal hernia that is reducible.  MSK: No joint swelling, normal movements of all extremities. Range of motion- normal.  Extremities: FROM, no edema, wounds, or contusions  Skin- No lesions, contusions, or erythema.  Neuro:  Alert/oriented x4, no focal motor or sensory deficits.  Bilateral upper extremity tremor      Relevant Results               Assessment/Plan                  Assessment & Plan  Failure to thrive in adult    Mr. John W Nyhan is a 74 y.o. male with PMH significant for ESRD on HD MWF secondary to remote hx of Lithium use, HTN, HLD, CAD, bipolar disorder, gout, and aortic stenosis who presented to [facility] ED from home on 4/30/2025 with difficulty taking care of himself at home.  HDS on arrival to the ED today.  Labs fairly unremarkable.  Social work consulted from the ED and recommended placement.  Patient is agreeable to going to [facility] where he has been previously.  He was admitted to [facility] under teaching for PT/OT evaluation and placement to SNF.        Assessment & Plan     #Failure to thrive  -Medically stable  -SW involved for placement   -PT/OT consulted     #ESRD on HD MWF from remote hx of Lithium use (AV fistula)  -Patient of Dr. Weems and receives dialysis at [facility] on MWF  -Nephrology consulted for HD, appreciate recs  -Midodrine as needed with dialysis  -Renal diet    #Right inguinal hernia  -Hernia is reducible on examination without any tenderness to palpation  -Will need referral to general surgery on discharge for R inguinal hernia        CHRONIC PROBLEMS:  # HTN  # HLD  # CAD  # Bipolar disorder  # Gout  # Aortic stenosis   - Continue home medications as appropriate     Global Plan of Care  IVF: as needed  Electrolytes: Replete with goal K>4 and Mg>2  Diet: renal  DVT prophylaxis: subcutaneous heparin   Bowel regime: miralax  Consults: PT/OT, SW, Nephro  Code Status: FULL     Dispo: Anticipate 1-2 midnight stays pending PT/OT eval and placement       Assessment and plan discussed with attending  Ciara Gibson,   Internal medicine, PGY 3

## 2025-04-04 NOTE — PROGRESS NOTES
Occupational Therapy    Occupational Therapy    Evaluation    Patient Name: John W Nyhan  MRN: [id number]  Today's Date: 4/4/2025  Time Calculation  Start Time: 0851  Stop Time: 0903  Time Calculation (min): 12 min  3135/3135-A    Assessment  IP OT Assessment  OT Assessment:  (Pt. presents with decreased balance impacting pt. ability to complete ADLs and mobility independently)  Prognosis: Good  Barriers to Discharge Home: No anticipated barriers  Evaluation/Treatment Tolerance: Patient tolerated treatment well  End of Session Communication: Bedside nurse  End of Session Patient Position: Up in chair, Alarm on    Plan:  Treatment Interventions: ADL retraining, Functional transfer training  OT Frequency: 4 times per week  OT Discharge Recommendations: Low intensity level of continued care  Equipment Recommended upon Discharge: Straight cane  OT Recommended Transfer Status: Stand by assist  OT - OK to Discharge: Yes (Next level of care when cleared by medical team)    Subjective Per EMR:   Mr. John W Nyhan is a 74 y.o. male with PMH significant for ESRD on HD MWF secondary to remote hx of Lithium use, HTN, HLD, CAD, bipolar disorder, gout, and aortic stenosis who presented to [facility] ED from home with initial complaint of R inguinal hernia. Upon further interviewing, however, patient revealed to ED provider that he is having trouble taking care of himself at home and would like some help with placement to a nursing home. Patient seen and examined at bedside in the emergency department with attending physician and is a good historian.  Patient reports he realized he needs more help caring for himself when he had a trouble applying eyedrops due to hand tremors.  He is also been having trouble cooking meals.  He gets Meals on Wheels 4 times a week and has a home health aide who helps him with laundry and grocery shopping that comes twice a month.  He lives in his apartment alone and ambulates with a cane at baseline.   Otherwise, he is not having any acute symptoms.  His right inguinal hernia has been present for multiple months and is not causing him any pain.  He received dialysis yesterday and follows with Dr. Weems.  He denies any headache, blurred vision, chest pain, shortness of breath, nausea, vomiting, abdominal pain, recent falls, or BLE edema. Patient is agreeable to going to [facility].   Current Problem:  1. Failure to thrive in adult            General:  General  Reason for Referral: ADLs, discharge planning  Referred By: Dr. Redding  Family/Caregiver Present: No  Co-Treatment: PT  Co-Treatment Reason: Safety  Prior to Session Communication: Bedside nurse  Patient Position Received: Up in chair, Alarm on    Precautions:  Medical Precautions: Fall precautions        Pain:  Pain Assessment  Pain Assessment: 0-10  0-10 (Numeric) Pain Score: 0 - No pain    Objective     Cognition:  Overall Cognitive Status: Within Functional Limits  Orientation Level: Oriented X4             Home Living:  Home Living Comments:  (Pt. lives alone in apt with tub shower has seat and grab bars. PLOF MOD I with quad cane, drives. Has MOW 4x/wk, HHA 2x/month)     Prior Function:  Level of [place]: Independent with ADLs and functional transfers  ADL Assistance: Independent  Homemaking Assistance: Needs assistance  Ambulatory Assistance: Independent        ADL:  Grooming Assistance: Stand by  LE Dressing Assistance: Stand by    Activity Tolerance:  Endurance: Endurance does not limit participation in activity    Bed Mobility/Transfers:      Transfers  Transfer:  (sit<>stand SUP)    Ambulation/Gait Training:  Functional Mobility  Functional Mobility Performed:  (Completes functional mobility without AD SBA in halls; reaches out for rail occassionally)    Sitting Balance:  Static Sitting Balance  Static Sitting-Balance Support: No upper extremity supported  Static Sitting-Level of Assistance: Independent    Standing Balance:  Static  Standing Balance  Static Standing-Balance Support: No upper extremity supported  Static Standing-Level of Assistance: Independent      Sensation:  Sensation Comment: tingling to right hand        Hand Function:  Hand Function  Gross Grasp: Functional  Coordination: Functional    Extremities: RUE   RUE : Within Functional Limits and LUE   LUE: Within Functional Limits    Outcome Measures: [facility] Daily Activity  Putting on and taking off regular lower body clothing: A little  Bathing (including washing, rinsing, drying): A little  Putting on and taking off regular upper body clothing: None  Toileting, which includes using toilet, bedpan or urinal: A little  Taking care of personal grooming such as brushing teeth: None  Eating Meals: None  Daily Activity - Total Score: 21                       EDUCATION:  Education  Individual(s) Educated: Patient  Education Provided: Fall precautons, Risk and benefits of OT discussed with patient or other, POC discussed and agreed upon  Patient Response to Education: Patient/Caregiver Verbalized Understanding of Information      Goals:   Encounter Problems       Encounter Problems (Active)       Dressings Lower Extremities       STG - Patient to complete lower body dressing MILLI        Start:  04/04/25    Expected End:  04/18/25               Functional Balance       LTG - Patient will maintain standing and sitting balance to allow for completion of daily activities       Start:  04/04/25    Expected End:  04/18/25               Grooming       STG - Patient completes grooming MOD I       Start:  04/04/25    Expected End:  04/18/25               OT Transfers       STG - Patient will perform toilet transfer MOD I       Start:  04/04/25    Expected End:  04/18/25

## 2025-04-04 NOTE — CARE PLAN
The patient's goals for the shift include Discharge planning     The clinical goals for the shift include See care plan

## 2025-04-04 NOTE — PROGRESS NOTES
John W Nyhan is a 74 y.o. male on day 0 of admission presenting with Failure to thrive in adult.      Subjective   4 y.o. male with PMH significant for ESRD on HD MWF secondary to remote hx of Lithium use, HTN, HLD, CAD, bipolar disorder, gout, and aortic stenosis who [facility] from home on 2025 with difficulty taking care of himself at home.     Renal consulted for maintenance HD     Medication Documentation Review Audit       Reviewed by Samuel Rodriguez (Technician) on 25 at 1440      Medication Order Taking? Sig Documenting Provider Last Dose Status   buPROPion SR (Wellbutrin SR) 200 mg 12 hr tablet [id number] Yes Take 1 tablet (200 mg) by mouth once daily. Do not crush, chew, or split. Yulissa Velasquez MD 4/3/2025 Morning Active   divalproex (Depakote ER) 250 mg 24 hr tablet [id number] Yes Take 1 tablet (250 mg) by mouth once daily at bedtime. Do not crush, chew, or split. Yulissa Provider, MD 2025 Bedtime Active   midodrine (Proamatine) 10 mg tablet [id number] Yes Take 1 tablet (10 mg) by mouth once daily as needed (for low BP). Historical Provider, MD Past Week Active   multivitamin (Daily-Kobi, with folic acid,) tablet [id number] Yes Take 1 tablet by mouth once daily. Yulissa Provider, MD 4/3/2025 Morning Active   prednisoLONE acetate (Pred-Forte) 1 % ophthalmic suspension [id number] Yes Administer 1 drop into both eyes 4 times a day. Historical Provider, MD 2025 Morning Active   traZODone (Desyrel) 50 mg tablet [id number] No Take 1 tablet (50 mg) by mouth once daily at bedtime.   Patient not taking: Reported on 4/3/2025    Rd Bianchi, DO Not Taking  23 2155   venlafaxine (Effexor) 37.5 mg tablet [id number]  Take 1 tablet (37.5 mg) by mouth once daily. Historical Provider, MD  Active   venlafaxine XR (Effexor-XR) 150 mg 24 hr capsule [id number] No Take 1 capsule (150 mg) by mouth once daily with a meal. Do not crush or chew. Do not start before 2023.  "  Patient not taking: Reported on 4/3/2025    Rd Bianchi, DO Not Taking  11/15/23 2819                    Objective          Vitals 24HR  Heart Rate:  [72-87]   Temp:  [36 °C (96.8 °F)-36.8 °C (98.2 °F)]   Resp:  [16-18]   BP: (112-136)/(63-86)   Height:  [172.7 cm (5' 8\")]   Weight:  [56 kg (123 lb 7.3 oz)]   SpO2:  [96 %-98 %]       Intake/Output last 3 Shifts:    Intake/Output Summary (Last 24 hours) at 2025 1249  Last data filed at 2025 0628  Gross per 24 hour   Intake 240 ml   Output 400 ml   Net -160 ml       Physical Exam    Neck: supple  Lung: no wheeze  Cv: [id number]  Ext: no c/c  Relevant Results      Results for orders placed or performed during the hospital encounter of 25 (from the past 24 hours)   CBC and Auto Differential   Result Value Ref Range    WBC 9.4 4.4 - 11.3 x10*3/uL    nRBC 0.0 0.0 - 0.0 /100 WBCs    RBC 3.57 (L) 4.50 - 5.90 x10*6/uL    Hemoglobin 12.1 (L) 13.5 - 17.5 g/dL    Hematocrit 38.2 (L) 41.0 - 52.0 %     (H) 80 - 100 fL    MCH 33.9 26.0 - 34.0 pg    MCHC 31.7 (L) 32.0 - 36.0 g/dL    RDW 13.7 11.5 - 14.5 %    Platelets 113 (L) 150 - 450 x10*3/uL    Neutrophils % 66.4 40.0 - 80.0 %    Immature Granulocytes %, Automated 0.3 0.0 - 0.9 %    Lymphocytes % 24.3 13.0 - 44.0 %    Monocytes % 8.0 2.0 - 10.0 %    Eosinophils % 0.7 0.0 - 6.0 %    Basophils % 0.3 0.0 - 2.0 %    Neutrophils Absolute 6.21 (H) 1.60 - 5.50 x10*3/uL    Immature Granulocytes Absolute, Automated 0.03 0.00 - 0.50 x10*3/uL    Lymphocytes Absolute 2.27 0.80 - 3.00 x10*3/uL    Monocytes Absolute 0.75 0.05 - 0.80 x10*3/uL    Eosinophils Absolute 0.07 0.00 - 0.40 x10*3/uL    Basophils Absolute 0.03 0.00 - 0.10 x10*3/uL   Basic metabolic panel   Result Value Ref Range    Glucose 115 (H) 74 - 99 mg/dL    Sodium 138 136 - 145 mmol/L    Potassium 5.0 3.5 - 5.3 mmol/L    Chloride 92 (L) 98 - 107 mmol/L    Bicarbonate 33 (H) 21 - 32 mmol/L    Anion Gap 18 10 - 20 mmol/L    Urea Nitrogen 36 (H) 6 - 23 " mg/dL    Creatinine 4.60 (H) 0.50 - 1.30 mg/dL    eGFR 13 (L) >60 mL/min/1.73m*2    Calcium 9.6 8.6 - 10.3 mg/dL   Urinalysis with Reflex Culture and Microscopic   Result Value Ref Range    Color, Urine Light-Yellow Light-Yellow, Yellow, Dark-Yellow    Appearance, Urine Clear Clear    Specific Gravity, Urine 1.007 1.005 - 1.035    pH, Urine 8.0 5.0, 5.5, 6.0, 6.5, 7.0, 7.5, 8.0    Protein, Urine 70 (1+) (A) NEGATIVE, 10 (TRACE), 20 (TRACE) mg/dL    Glucose, Urine Normal Normal mg/dL    Blood, Urine 0.06 (1+) (A) NEGATIVE mg/dL    Ketones, Urine NEGATIVE NEGATIVE mg/dL    Bilirubin, Urine NEGATIVE NEGATIVE mg/dL    Urobilinogen, Urine Normal Normal mg/dL    Nitrite, Urine NEGATIVE NEGATIVE    Leukocyte Esterase, Urine 500 Enrique/uL (A) NEGATIVE   Extra Urine Gray Tube   Result Value Ref Range    Extra Tube Hold for add-ons.    Microscopic Only, Urine   Result Value Ref Range    WBC, Urine 21-50 (A) 1-5, NONE /HPF    RBC, Urine 3-5 NONE, 1-2, 3-5 /HPF   CBC   Result Value Ref Range    WBC 6.4 4.4 - 11.3 x10*3/uL    nRBC 0.0 0.0 - 0.0 /100 WBCs    RBC 3.06 (L) 4.50 - 5.90 x10*6/uL    Hemoglobin 10.4 (L) 13.5 - 17.5 g/dL    Hematocrit 32.3 (L) 41.0 - 52.0 %     (H) 80 - 100 fL    MCH 34.0 26.0 - 34.0 pg    MCHC 32.2 32.0 - 36.0 g/dL    RDW 13.6 11.5 - 14.5 %    Platelets 77 (L) 150 - 450 x10*3/uL   Magnesium   Result Value Ref Range    Magnesium 2.27 1.60 - 2.40 mg/dL   Renal Function Panel   Result Value Ref Range    Glucose 70 (L) 74 - 99 mg/dL    Sodium 132 (L) 136 - 145 mmol/L    Potassium 4.6 3.5 - 5.3 mmol/L    Chloride 91 (L) 98 - 107 mmol/L    Bicarbonate 27 21 - 32 mmol/L    Anion Gap 19 10 - 20 mmol/L    Urea Nitrogen 47 (H) 6 - 23 mg/dL    Creatinine 5.35 (H) 0.50 - 1.30 mg/dL    eGFR 11 (L) >60 mL/min/1.73m*2    Calcium 8.9 8.6 - 10.3 mg/dL    Phosphorus 5.2 (H) 2.5 - 4.9 mg/dL    Albumin 4.0 3.4 - 5.0 g/dL                Assessment/Plan        Assessment  ESRD on HD MWF  Anemia of ckd  H/o  HTN  Hyperphosphatemia   2HPT  Mild hyponatremia    Plan   HD today as ordered  Renal MVI  Goal phos level 3.5-5.5  GIA      Thank you    Tank Weems MD

## 2025-04-04 NOTE — PROGRESS NOTES
04/04/25 1539   Discharge Planning   Home or Post Acute Services In home services   Type of Post Acute Facility Services Other (Comment)   Type of Home Care Services Home health aide;Home nursing visits;Home OT;Home PT;Meals on Wheels;Other (Comment)  (Meals on Wheels)   Expected Discharge Disposition Home H   Does the patient need discharge transport arranged? Yes   RoundTrip coordination needed? Yes   Has discharge transport been arranged? No      Per social work patient does not want to discharge to a skilled nursing unit private pay, long term care facility, or an assisted living. He does not want to pursue the Medicaid filing process, and relocate to one of the above facilities. He does not want to lose this income to the facility. He is agreeable to return home with [facility] care for pt/ot, nursing for medication review, social work for additional concerns (transportation), and aid services.   He currently has meals on wheels 4 times a week and an aide twice a week.   He is agreeable to return home after dialysis treatment. Referral sent to [facility]. Message sent to the attending for an outgoing home care order for the above disciplines mentioned in previous paragraph. Referral sent to [facility].     1550: [facility] cannot accept. Referral sent to [facility].

## 2025-04-04 NOTE — CARE PLAN
Problem: Pain - Adult  Goal: Verbalizes/displays adequate comfort level or baseline comfort level  4/4/2025 0458 by Amelia Melo RN  Outcome: Progressing  4/4/2025 0458 by Amelia Melo RN  Flowsheets (Taken 4/4/2025 0458)  Verbalizes/displays adequate comfort level or baseline comfort level:   Encourage patient to monitor pain and request assistance   Assess pain using appropriate pain scale     Problem: Safety - Adult  Goal: Free from fall injury  Outcome: Progressing     Problem: Nutrition  Goal: Nutrient intake appropriate for maintaining nutritional needs  Outcome: Progressing     Problem: Skin  Goal: Decreased wound size/increased tissue granulation at next dressing change  Outcome: Progressing  Goal: Participates in plan/prevention/treatment measures  Outcome: Progressing  Goal: Prevent/manage excess moisture  Outcome: Progressing  Goal: Prevent/minimize sheer/friction injuries  Outcome: Progressing  Goal: Promote/optimize nutrition  Outcome: Progressing     Problem: Fall/Injury  Goal: Not fall by end of shift  Outcome: Progressing  Goal: Be free from injury by end of the shift  Outcome: Progressing  Goal: Verbalize understanding of personal risk factors for fall in the hospital  Outcome: Progressing  Goal: Verbalize understanding of risk factor reduction measures to prevent injury from fall in the home  Outcome: Progressing     Problem: Respiratory  Goal: Clear secretions with interventions this shift  Outcome: Progressing  Goal: Minimize anxiety/maximize coping throughout shift  Outcome: Progressing  Goal: Minimal/no exertional discomfort or dyspnea this shift  Outcome: Progressing  Goal: No signs of respiratory distress (eg. Use of accessory muscles. Peds grunting)  Outcome: Progressing  Goal: Patent airway maintained this shift  Outcome: Progressing  Goal: Tolerate mechanical ventilation evidenced by VS/agitation level this shift  Outcome: Progressing  Goal: Tolerate pulmonary toileting this  shift  Outcome: Progressing  Goal: Verbalize decreased shortness of breath this shift  Outcome: Progressing  Goal: Wean oxygen to maintain O2 saturation per order/standard this shift  Outcome: Progressing  Goal: Increase self care and/or family involvement in next 24 hours  Outcome: Progressing   The patient's goals for the shift include pt.  want  to  feel  better

## 2025-04-04 NOTE — PROGRESS NOTES
04/04/25 1320   Discharge Planning   Living Arrangements Alone   Support Systems Other (Comment)   Type of Residence Private residence   Home or Post Acute Services Post acute facilities (Rehab/SNF/etc)   Type of Post Acute Facility Services Long term care   Expected Discharge Disposition Inter     Met with the pt to discuss options. Therapy recommends low intensity. Explained that he would not be eligible for skilled care and discussed long term placement under his medicaid, if eligible. [facility] cannot provide long term care. He is receptive to a referral to [facility]. Asked the DSC to send the referral.     1500 Spoke with Morgan from [facility]. They did verify that he has a devoted medicare advantage plan and a united medicaid plan.  They are willing to consider the pt but would need to get a one time contract with [facility] in order to accept. Spoke with the pt and updated him on [facility] acceptance but needing a one time contract. Additionally, if he goes to the facility under his medicaid, he will have a financial liability that he would have to pay every month. At this time, the pt does  not want to go to a nursing home for care. He plans to return home but is receptive to home care services. Updated the [facility].

## 2025-04-04 NOTE — PROGRESS NOTES
"Nutrition Initial Assessment:   Nutrition Assessment    Reason for Assessment: Admission nursing screening (MAT=3 for \"weight loss & eating poorly\")    Patient is a 74 y.o. male presenting with trouble taking care of himself at home.  PMH significant for ESRD on HD MWF secondary to remote hx of Lithium use, HTN, HLD, CAD, bipolar disorder, gout, and aortic stenosis     Nutrition History:  Energy Intake: Good > 75 % (Renal diet - pt ordering his own meals.)  Food and Nutrient History: Per chart: Patient reports he realized he needs more help caring for himself when he had a trouble applying eyedrops due to hand tremors.  He is also been having trouble cooking meals.  He gets Meals on Wheels 4 times a week and has a home health aide who helps him with laundry and grocery shopping that comes twice a month.  He lives in his apartment alone and ambulates with a cane at baseline. Pt only gets the lunch meals (sandwiches) from Meals On Wheels bc he does not like the dinner options. He supplements the remainder of his meals with frozen meals from the grocery store and protein bars (that he orders). He cannot afford to buy the renal oral nutrition supplements (like Nepro) and currently does not qualify for the protein bars at the dialysis center because his albumin is within normal range. He admits that his diet is not always ideal, but moving to an AL facility is not an option right now because he has too much stuff in his apartment.  Vitamin/Herbal Supplement Use: Daily Kobi       Anthropometrics:  Height: 172.7 cm (5' 8\")   Weight: 56 kg (123 lb 7.3 oz)   BMI (Calculated): 18.78  IBW/kg (Dietitian Calculated): 69.85 kg  Percent of IBW: 80.17 %                      Weight History:   Wt Readings from Last 10 Encounters:   04/03/25 56 kg (123 lb 7.3 oz)   10/11/23 58.1 kg (128 lb)         Weight Change %:       Nutrition Focused Physical Exam Findings:    Subcutaneous Fat Loss:   Orbital Fat Pads: Mild-Moderate (slight dark " circles and slight hollowing)  Buccal Fat Pads: Mild-Moderate (flat cheeks, minimal bounce)  Triceps: Mild-Moderate (less than ample fat tissue)  Ribs: Defer  Muscle Wasting:  Temporalis: Mild-Moderate (slight depression)  Pectoralis (Clavicular Region): Mild-Moderate (some protrusion of clavicle)  Deltoid/Trapezius: Defer  Interosseous: Mild-Moderate (slightly depressed area between thumb and forefinger)  Trapezius/Infraspinatus/Supraspinatus (Scapular Region): Defer  Quadriceps: Mild-Moderate (mild depression on inner and outer thigh)  Gastrocnemius: Mild-Moderate (not well developed muscle)  Edema:  Edema: none  Physical Findings:  Skin: Positive (no open areas, but many excoriations)    Nutrition Significant Labs:  CBC Trend:   Results from last 7 days   Lab Units 04/04/25  0459 04/03/25  1346   WBC AUTO x10*3/uL 6.4 9.4   RBC AUTO x10*6/uL 3.06* 3.57*   HEMOGLOBIN g/dL 10.4* 12.1*   HEMATOCRIT % 32.3* 38.2*   MCV fL 106* 107*   PLATELETS AUTO x10*3/uL 77* 113*    , BMP Trend:   Results from last 7 days   Lab Units 04/04/25  0459 04/03/25  1346   GLUCOSE mg/dL 70* 115*   CALCIUM mg/dL 8.9 9.6   SODIUM mmol/L 132* 138   POTASSIUM mmol/L 4.6 5.0   CO2 mmol/L 27 33*   CHLORIDE mmol/L 91* 92*   BUN mg/dL 47* 36*   CREATININE mg/dL 5.35* 4.60*    , Renal Lab Trend:   Results from last 7 days   Lab Units 04/04/25  0459 04/03/25  1346   POTASSIUM mmol/L 4.6 5.0   PHOSPHORUS mg/dL 5.2*  --    SODIUM mmol/L 132* 138   MAGNESIUM mg/dL 2.27  --    EGFR mL/min/1.73m*2 11* 13*   BUN mg/dL 47* 36*   CREATININE mg/dL 5.35* 4.60*        Nutrition Specific Medications:  Scheduled medications  buPROPion SR, 200 mg, oral, Daily  divalproex, 250 mg, oral, Nightly  heparin (porcine), 5,000 Units, subcutaneous, q8h ZOHRA  polyethylene glycol, 17 g, oral, Daily  prednisoLONE acetate, 1 drop, Both Eyes, 4x daily  venlafaxine, 37.5 mg, oral, Daily  vitamin B complex-vitamin C-folic acid, 1 capsule, oral, Daily      Continuous  medications     PRN medications  PRN medications: midodrine      I/O:    ;      Dietary Orders (From admission, onward)       Start     Ordered    04/04/25 0934  Oral nutritional supplements  Until discontinued        Question Answer Comment   Deliver with Breakfast Vanilla   Deliver with Dinner    Select supplement: Nepro Carbsteady        04/04/25 0934    04/03/25 1729  Adult diet Renal; Potassium Restricted 2 gm (50mEq); 2 - 3 grams Sodium  Diet effective now        Question Answer Comment   Diet type Renal    Potassium restriction: Potassium Restricted 2 gm (50mEq)    Sodium restriction: 2 - 3 grams Sodium        04/03/25 1728    04/03/25 1544  May Participate in Room Service  ( ROOM SERVICE MAY PARTICIPATE)  Once        Question:  .  Answer:  Yes    04/03/25 1543                     Estimated Needs:   Total Energy Estimated Needs in 24 hours (kCal):  (1750-5341 kcal (30-35 kcal/kg))     Total Protein Estimated Needs in 24 Hours (g):  (67-78g protein (1.2-1.4g/kg))        Method for Estimating 24 Hour Fluid Needs: 1ml/kcal or per dialysis protocol  Patient on Order Fluid Restriction: No        Nutrition Diagnosis   Malnutrition Diagnosis  Patient has Malnutrition Diagnosis: Yes  Diagnosis Status: New  Malnutrition Diagnosis: Moderate malnutrition related to chronic disease or condition  Related to: chronic hemodialysis  As Evidenced by: estimated energy intakes <75% of needs for >1 month abd NFPE showing moderate fat loss/muscle wasting.            Nutrition Interventions/Recommendations   Nutrition prescription for oral nutrition    Nutrition Recommendations:  Individualized Nutrition Prescription Provided for : Continue RENAL diet supplemented with PROTEIN BARS and NEPRO with meals.    Nutrition Interventions/Goals:   Meals and Snacks: Mineral-modified diet  Goal: Renal diet to provide 2gm Na and 2gm Potassium daily.  Medical Food Supplement: Commercial beverage medical food supplement therapy  Goal: NEPRO  provides 420 kcal/19g protein each 8-ounce, Protein Bars provide 12g protein each bar.  Additional Interventions: Reviewed ordering procedures. Patient has a copy of the [facility] Renal menu.      Education Documentation  Nutrition Related Education, taught by Dorothy Pappas RDN, LD at 4/4/2025  2:47 PM.  Learner: Patient  Readiness: Acceptance  Method: Explanation  Response: Verbalizes Understanding  Comment: Encouraged pt to try to consider Meals on Wheels through [facility] [phone] where we can provide therapeutic meals plus an oral nutrition supplement at least twice daily to better meet his nutrition needs on dialysis.              Nutrition Monitoring and Evaluation   Food/Nutrient Related History Monitoring  Monitoring and Evaluation Plan: Intake / amount of food  Intake / Amount of food: Consumes at least 75% or more of meals/snacks/supplements    Anthropometric Measurements  Monitoring and Evaluation Plan: Body weight  Body Weight: Estimated dry body weight  Criteria: Maintain estimated dry weight per dialysis goals.    Biochemical Data, Medical Tests and Procedures  Monitoring and Evaluation Plan: Electrolyte/renal panel  Electrolyte and Renal Panel: Electrolytes within normal limits, BUN, Creatinine  Criteria: Maintain baseline.         Goal Status: New goal(s) identified    Time Spent (min): 45 minutes

## 2025-04-05 LAB
ALBUMIN SERPL BCP-MCNC: 4.4 G/DL (ref 3.4–5)
ANION GAP SERPL CALC-SCNC: 17 MMOL/L (ref 10–20)
ATRIAL RATE: 70 BPM
BACTERIA UR CULT: NO GROWTH
BUN SERPL-MCNC: 20 MG/DL (ref 6–23)
CALCIUM SERPL-MCNC: 9.4 MG/DL (ref 8.6–10.3)
CHLORIDE SERPL-SCNC: 96 MMOL/L (ref 98–107)
CO2 SERPL-SCNC: 29 MMOL/L (ref 21–32)
CREAT SERPL-MCNC: 2.92 MG/DL (ref 0.5–1.3)
EGFRCR SERPLBLD CKD-EPI 2021: 22 ML/MIN/1.73M*2
ERYTHROCYTE [DISTWIDTH] IN BLOOD BY AUTOMATED COUNT: 13.5 % (ref 11.5–14.5)
GLUCOSE SERPL-MCNC: 72 MG/DL (ref 74–99)
HBV SURFACE AB SER-ACNC: 83.7 MIU/ML
HBV SURFACE AG SERPL QL IA: NONREACTIVE
HCT VFR BLD AUTO: 35.5 % (ref 41–52)
HGB BLD-MCNC: 11.5 G/DL (ref 13.5–17.5)
MAGNESIUM SERPL-MCNC: 2.28 MG/DL (ref 1.6–2.4)
MCH RBC QN AUTO: 33.9 PG (ref 26–34)
MCHC RBC AUTO-ENTMCNC: 32.4 G/DL (ref 32–36)
MCV RBC AUTO: 105 FL (ref 80–100)
NRBC BLD-RTO: 0 /100 WBCS (ref 0–0)
P AXIS: 2 DEGREES
P OFFSET: 162 MS
P ONSET: 131 MS
PHOSPHATE SERPL-MCNC: 3.8 MG/DL (ref 2.5–4.9)
PLATELET # BLD AUTO: 80 X10*3/UL (ref 150–450)
POTASSIUM SERPL-SCNC: 3.9 MMOL/L (ref 3.5–5.3)
PR INTERVAL: 154 MS
Q ONSET: 208 MS
QRS COUNT: 12 BEATS
QRS DURATION: 106 MS
QT INTERVAL: 442 MS
QTC CALCULATION(BAZETT): 477 MS
QTC FREDERICIA: 465 MS
R AXIS: -49 DEGREES
RBC # BLD AUTO: 3.39 X10*6/UL (ref 4.5–5.9)
SODIUM SERPL-SCNC: 138 MMOL/L (ref 136–145)
T AXIS: 44 DEGREES
T OFFSET: 429 MS
VENTRICULAR RATE: 70 BPM
WBC # BLD AUTO: 4.6 X10*3/UL (ref 4.4–11.3)

## 2025-04-05 PROCEDURE — 83735 ASSAY OF MAGNESIUM: CPT

## 2025-04-05 PROCEDURE — G0378 HOSPITAL OBSERVATION PER HR: HCPCS

## 2025-04-05 PROCEDURE — 36415 COLL VENOUS BLD VENIPUNCTURE: CPT

## 2025-04-05 PROCEDURE — 90937 HEMODIALYSIS REPEATED EVAL: CPT

## 2025-04-05 PROCEDURE — 2500000001 HC RX 250 WO HCPCS SELF ADMINISTERED DRUGS (ALT 637 FOR MEDICARE OP): Performed by: INTERNAL MEDICINE

## 2025-04-05 PROCEDURE — 80069 RENAL FUNCTION PANEL: CPT

## 2025-04-05 PROCEDURE — 99232 SBSQ HOSP IP/OBS MODERATE 35: CPT

## 2025-04-05 PROCEDURE — 2500000004 HC RX 250 GENERAL PHARMACY W/ HCPCS (ALT 636 FOR OP/ED)

## 2025-04-05 PROCEDURE — 96372 THER/PROPH/DIAG INJ SC/IM: CPT

## 2025-04-05 PROCEDURE — 2500000001 HC RX 250 WO HCPCS SELF ADMINISTERED DRUGS (ALT 637 FOR MEDICARE OP)

## 2025-04-05 PROCEDURE — 85027 COMPLETE CBC AUTOMATED: CPT

## 2025-04-05 RX ORDER — METOPROLOL TARTRATE 1 MG/ML
INJECTION, SOLUTION INTRAVENOUS
Status: DISPENSED
Start: 2025-04-05 | End: 2025-04-06

## 2025-04-05 RX ADMIN — Medication 1 CAPSULE: at 09:41

## 2025-04-05 RX ADMIN — DIVALPROEX SODIUM 250 MG: 250 TABLET, EXTENDED RELEASE ORAL at 01:09

## 2025-04-05 RX ADMIN — HEPARIN SODIUM 5000 UNITS: 5000 INJECTION, SOLUTION INTRAVENOUS; SUBCUTANEOUS at 06:12

## 2025-04-05 RX ADMIN — HEPARIN SODIUM 5000 UNITS: 5000 INJECTION, SOLUTION INTRAVENOUS; SUBCUTANEOUS at 21:27

## 2025-04-05 RX ADMIN — VENLAFAXINE 37.5 MG: 37.5 TABLET ORAL at 09:41

## 2025-04-05 RX ADMIN — PREDNISOLONE ACETATE 1 DROP: 10 SUSPENSION/ DROPS OPHTHALMIC at 06:12

## 2025-04-05 RX ADMIN — POLYETHYLENE GLYCOL 3350 17 G: 17 POWDER, FOR SOLUTION ORAL at 09:41

## 2025-04-05 RX ADMIN — PREDNISOLONE ACETATE 1 DROP: 10 SUSPENSION/ DROPS OPHTHALMIC at 20:00

## 2025-04-05 RX ADMIN — PREDNISOLONE ACETATE 1 DROP: 10 SUSPENSION/ DROPS OPHTHALMIC at 13:29

## 2025-04-05 RX ADMIN — DIVALPROEX SODIUM 250 MG: 250 TABLET, EXTENDED RELEASE ORAL at 20:00

## 2025-04-05 RX ADMIN — HEPARIN SODIUM 5000 UNITS: 5000 INJECTION, SOLUTION INTRAVENOUS; SUBCUTANEOUS at 13:29

## 2025-04-05 RX ADMIN — PREDNISOLONE ACETATE 1 DROP: 10 SUSPENSION/ DROPS OPHTHALMIC at 16:16

## 2025-04-05 RX ADMIN — BUPROPION HYDROCHLORIDE 200 MG: 100 TABLET, FILM COATED, EXTENDED RELEASE ORAL at 09:41

## 2025-04-05 ASSESSMENT — COGNITIVE AND FUNCTIONAL STATUS - GENERAL
PERSONAL GROOMING: A LITTLE
PERSONAL GROOMING: A LITTLE
DAILY ACTIVITIY SCORE: 19
CLIMB 3 TO 5 STEPS WITH RAILING: A LITTLE
DRESSING REGULAR UPPER BODY CLOTHING: A LITTLE
MOVING TO AND FROM BED TO CHAIR: A LITTLE
STANDING UP FROM CHAIR USING ARMS: A LITTLE
DRESSING REGULAR LOWER BODY CLOTHING: A LITTLE
DRESSING REGULAR LOWER BODY CLOTHING: A LITTLE
MOBILITY SCORE: 20
CLIMB 3 TO 5 STEPS WITH RAILING: A LITTLE
DAILY ACTIVITIY SCORE: 19
STANDING UP FROM CHAIR USING ARMS: A LITTLE
DRESSING REGULAR UPPER BODY CLOTHING: A LITTLE
MOVING TO AND FROM BED TO CHAIR: A LITTLE
WALKING IN HOSPITAL ROOM: A LITTLE
WALKING IN HOSPITAL ROOM: A LITTLE
HELP NEEDED FOR BATHING: A LITTLE
HELP NEEDED FOR BATHING: A LITTLE
MOBILITY SCORE: 20
TOILETING: A LITTLE
TOILETING: A LITTLE

## 2025-04-05 ASSESSMENT — PAIN - FUNCTIONAL ASSESSMENT: PAIN_FUNCTIONAL_ASSESSMENT: UNABLE TO ASSESS

## 2025-04-05 ASSESSMENT — PAIN SCALES - GENERAL
PAINLEVEL_OUTOF10: 0 - NO PAIN
PAINLEVEL_OUTOF10: 0 - NO PAIN

## 2025-04-05 NOTE — CARE PLAN
Problem: Skin  Goal: Participates in plan/prevention/treatment measures  Outcome: Progressing  Goal: Prevent/manage excess moisture  Outcome: Progressing  Goal: Prevent/minimize sheer/friction injuries  4/5/2025 0304 by Amelia Melo RN  Flowsheets (Taken 4/5/2025 0304)  Prevent/minimize sheer/friction injuries:   HOB 30 degrees or less   Turn/reposition every 2 hours/use positioning/transfer devices  4/5/2025 0304 by Amelia Melo RN  Outcome: Progressing  Flowsheets (Taken 4/5/2025 0304)  Prevent/minimize sheer/friction injuries:   HOB 30 degrees or less   Turn/reposition every 2 hours/use positioning/transfer devices   The patient's goals for the shift include pt.  want  to  feel  better    The clinical goals for the shift include hemodynamically stable.

## 2025-04-05 NOTE — CARE PLAN
The patient's goals for the shift include pt.  want  to  feel  better    The clinical goals for the shift include pt.will be seen by pcp/consults,recieve prescribed meds/tx's.

## 2025-04-05 NOTE — PRE-PROCEDURE NOTE
Report from Sending RN:    Report From: Amelia   Recent Surgery of Procedure: No  Baseline Level of Consciousness (LOC): Alert   Oxygen Use: No     Diabetic: No  Last BP Med Given Day of Dialysis: See MAR   Last Pain Med Given: See MAR   Lab Tests to be Obtained with Dialysis: No  Blood Transfusion to be Given During Dialysis: No  Available IV Access: Yes  Medications to be Administered During Dialysis: Yes  Continuous IV Infusion Running: No  Restraints on Currently or in the Last 24 Hours: N/A  Hand-Off Communication: Full Code

## 2025-04-05 NOTE — CARE PLAN
The patient's goals for the shift include pt.  want  to  feel  better    The clinical goals for the shift include hemodynamically stable.      Problem: Skin  Goal: Participates in plan/prevention/treatment measures  Outcome: Progressing  Goal: Prevent/manage excess moisture  Outcome: Progressing  Goal: Prevent/minimize sheer/friction injuries  Outcome: Progressing

## 2025-04-05 NOTE — CARE PLAN
The patient's goals for the shift include pt.  want  to  feel  better    The clinical goals for the shift include hemodynamically stable.    Problem: Pain - Adult  Goal: Verbalizes/displays adequate comfort level or baseline comfort level  Outcome: Progressing     Problem: Safety - Adult  Goal: Free from fall injury  Outcome: Progressing     Problem: Chronic Conditions and Co-morbidities  Goal: Patient's chronic conditions and co-morbidity symptoms are monitored and maintained or improved  Outcome: Progressing     Problem: Nutrition  Goal: Nutrient intake appropriate for maintaining nutritional needs  Outcome: Progressing     Problem: Skin  Goal: Decreased wound size/increased tissue granulation at next dressing change  Outcome: Progressing  Goal: Participates in plan/prevention/treatment measures  4/5/2025 0419 by Amelia Melo RN  Outcome: Progressing  4/5/2025 0304 by Amelia Melo RN  Outcome: Progressing  Goal: Prevent/manage excess moisture  4/5/2025 0419 by Amelia Melo RN  Outcome: Progressing  4/5/2025 0304 by Amelia Melo RN  Outcome: Progressing  Goal: Prevent/minimize sheer/friction injuries  4/5/2025 0419 by Amelia Melo RN  Outcome: Progressing  4/5/2025 0304 by Amelia Melo RN  Flowsheets (Taken 4/5/2025 0304)  Prevent/minimize sheer/friction injuries:   HOB 30 degrees or less   Turn/reposition every 2 hours/use positioning/transfer devices  4/5/2025 0304 by Amelia Melo RN  Outcome: Progressing  Flowsheets (Taken 4/5/2025 0304)  Prevent/minimize sheer/friction injuries:   HOB 30 degrees or less   Turn/reposition every 2 hours/use positioning/transfer devices  Goal: Promote/optimize nutrition  Outcome: Progressing  Goal: Promote skin healing  Outcome: Progressing     Problem: Fall/Injury  Goal: Not fall by end of shift  Outcome: Progressing  Goal: Be free from injury by end of the shift  Outcome: Progressing  Goal: Verbalize understanding of personal risk factors for fall in the  hospital  Outcome: Progressing  Goal: Verbalize understanding of risk factor reduction measures to prevent injury from fall in the home  Outcome: Progressing  Goal: Use assistive devices by end of the shift  Outcome: Progressing  Goal: Pace activities to prevent fatigue by end of the shift  Outcome: Progressing     Problem: Respiratory  Goal: Clear secretions with interventions this shift  Outcome: Progressing  Goal: Minimize anxiety/maximize coping throughout shift  Outcome: Progressing  Goal: Minimal/no exertional discomfort or dyspnea this shift  Outcome: Progressing  Goal: No signs of respiratory distress (eg. Use of accessory muscles. Peds grunting)  Outcome: Progressing  Goal: Patent airway maintained this shift  Outcome: Progressing  Goal: Tolerate mechanical ventilation evidenced by VS/agitation level this shift  Outcome: Progressing  Goal: Verbalize decreased shortness of breath this shift  Outcome: Progressing

## 2025-04-05 NOTE — PROGRESS NOTES
John W Nyhan is a 74 y.o. male on day 0 of admission presenting with Failure to thrive in adult.      Subjective   Tolerated HD yesterday     Medication Documentation Review Audit       Reviewed by Samuel Rodriguez (Technician) on 25 at 1440      Medication Order Taking? Sig Documenting Provider Last Dose Status   buPROPion SR (Wellbutrin SR) 200 mg 12 hr tablet [id number] Yes Take 1 tablet (200 mg) by mouth once daily. Do not crush, chew, or split. Yulissa Velasquez MD 4/3/2025 Morning Active   divalproex (Depakote ER) 250 mg 24 hr tablet [id number] Yes Take 1 tablet (250 mg) by mouth once daily at bedtime. Do not crush, chew, or split. Yulissa Velasquez MD 2025 Bedtime Active   midodrine (Proamatine) 10 mg tablet [id number] Yes Take 1 tablet (10 mg) by mouth once daily as needed (for low BP). Historical Provider, MD Past Week Active   multivitamin (Daily-Kobi, with folic acid,) tablet [id number] Yes Take 1 tablet by mouth once daily. Yulissa Provider, MD 4/3/2025 Morning Active   prednisoLONE acetate (Pred-Forte) 1 % ophthalmic suspension [id number] Yes Administer 1 drop into both eyes 4 times a day. Yulissa Provider, MD 2025 Morning Active   traZODone (Desyrel) 50 mg tablet [id number] No Take 1 tablet (50 mg) by mouth once daily at bedtime.   Patient not taking: Reported on 4/3/2025    Rd Bianchi, DO Not Taking  23 729   venlafaxine (Effexor) 37.5 mg tablet [id number]  Take 1 tablet (37.5 mg) by mouth once daily. Historical Provider, MD  Active   venlafaxine XR (Effexor-XR) 150 mg 24 hr capsule [id number] No Take 1 capsule (150 mg) by mouth once daily with a meal. Do not crush or chew. Do not start before 2023.   Patient not taking: Reported on 4/3/2025    Rd Bianchi, DO Not Taking  11/15/23 9119                    Objective          Vitals 24HR  Heart Rate:  [68-85]   Temp:  [36 °C (96.8 °F)-37 °C (98.6 °F)]   Resp:  [16-18]   BP: (101-157)/(68-93)   SpO2:  [96  %-100 %]     Intake/Output last 3 Shifts:    Intake/Output Summary (Last 24 hours) at 4/5/2025 0950  Last data filed at 4/5/2025 0153  Gross per 24 hour   Intake 1880 ml   Output 4402 ml   Net -2522 ml       Physical Exam  Neck: supple  Lung: no wheeze  Cv: [id number]  Ext: no c/c    Relevant Results      Results for orders placed or performed during the hospital encounter of 04/03/25 (from the past 24 hours)   Hepatitis B surface antigen   Result Value Ref Range    Hepatitis B Surface AG Nonreactive Nonreactive   POCT GLUCOSE   Result Value Ref Range    POCT Glucose 89 74 - 99 mg/dL   CBC   Result Value Ref Range    WBC 4.6 4.4 - 11.3 x10*3/uL    nRBC 0.0 0.0 - 0.0 /100 WBCs    RBC 3.39 (L) 4.50 - 5.90 x10*6/uL    Hemoglobin 11.5 (L) 13.5 - 17.5 g/dL    Hematocrit 35.5 (L) 41.0 - 52.0 %     (H) 80 - 100 fL    MCH 33.9 26.0 - 34.0 pg    MCHC 32.4 32.0 - 36.0 g/dL    RDW 13.5 11.5 - 14.5 %    Platelets 80 (L) 150 - 450 x10*3/uL   Magnesium   Result Value Ref Range    Magnesium 2.28 1.60 - 2.40 mg/dL   Renal Function Panel   Result Value Ref Range    Glucose 72 (L) 74 - 99 mg/dL    Sodium 138 136 - 145 mmol/L    Potassium 3.9 3.5 - 5.3 mmol/L    Chloride 96 (L) 98 - 107 mmol/L    Bicarbonate 29 21 - 32 mmol/L    Anion Gap 17 10 - 20 mmol/L    Urea Nitrogen 20 6 - 23 mg/dL    Creatinine 2.92 (H) 0.50 - 1.30 mg/dL    eGFR 22 (L) >60 mL/min/1.73m*2    Calcium 9.4 8.6 - 10.3 mg/dL    Phosphorus 3.8 2.5 - 4.9 mg/dL    Albumin 4.4 3.4 - 5.0 g/dL                Assessment/Plan        Assessment  ESRD on HD MWF  Anemia of ckd  H/o HTN  Hyperphosphatemia   2HPT  Mild hyponatremia     Plan   Next HD 4/7  SM for possible SNF  Adequate phos level  Hg is adequate          Tank Weems MD

## 2025-04-05 NOTE — POST-PROCEDURE NOTE
Report to Receiving RN:    Report To: Amelia  Time Report Called: 0120  Hand-Off Communication: 2L removed /75 HR 80 Temp 36.2  Complications During Treatment: No  Ultrafiltration Treatment: No, HD  Medications Administered During Dialysis: Yes  Blood Products Administered During Dialysis: No  Labs Sent During Dialysis: No  Heparin Drip Rate Changes: N/A

## 2025-04-05 NOTE — PROGRESS NOTES
No response from [facility] in [facility] yet. TC to agency, left vm requesting a response if they can accept pt.     1500 Received message from resident that pt feels he cannot return home alone. Spoke with pt at bedside. Pt does not feel safe returning home alone and feels he cannot manage his needs currently.  Pt confirms he goes to dialysis MWF @ [facility], drives himself as it is only a mile from home. Provided [facility] SNF/LTC list with onsite dialysis. Pt is interested in [facility], [facility], [facility] and [facility]. DSC tasked to send referrals. Pt does not have interest in [facility] as he has been there in the past. Pt is obs and will need a precert.

## 2025-04-05 NOTE — PROGRESS NOTES
John W Nyhan is a 74 y.o. male on day 0 of admission presenting with Failure to thrive in adult.      Subjective   Patient seen and examined this morning at bedside.  No acute event overnight.  Today patient is anxious about discharge home he states that he can't lives alone anymore.  No new complaint.  Patient tolerated hemodialysis session yesterday.       Objective     Last Recorded Vitals  /65 (BP Location: Right arm, Patient Position: Lying)   Pulse 78   Temp 37.5 °C (99.5 °F) (Temporal)   Resp 16   Wt 56 kg (123 lb 7.3 oz)   SpO2 96%   Intake/Output last 3 Shifts:    Intake/Output Summary (Last 24 hours) at 4/5/2025 1417  Last data filed at 4/5/2025 0153  Gross per 24 hour   Intake 1400 ml   Output 4400 ml   Net -3000 ml       Admission Weight  Weight: 56.2 kg (124 lb) (04/03/25 1048)    Daily Weight  04/03/25 : 56 kg (123 lb 7.3 oz)    Image Results  ECG 12 Lead  Normal sinus rhythm  Left anterior fascicular block  Septal infarct (cited on or before 11-OCT-2023)  Possible Lateral infarct (cited on or before 11-OCT-2023)  Abnormal ECG  When compared with ECG of 11-OCT-2023 17:58,  Criteria for Inferior infarct are no longer Present  Questionable change in initial forces of Anteroseptal leads  Nonspecific T wave abnormality no longer evident in Inferior leads  Nonspecific T wave abnormality no longer evident in Anterolateral leads      Physical Exam  Constitutional:       Appearance: Normal appearance.   HENT:      Head: Normocephalic and atraumatic.      Mouth/Throat:      Mouth: Mucous membranes are moist.   Cardiovascular:      Rate and Rhythm: Normal rate.      Heart sounds: No murmur heard.     No gallop.   Pulmonary:      Breath sounds: No wheezing or rhonchi.   Abdominal:      Tenderness: There is no abdominal tenderness.   Musculoskeletal:      Right lower leg: No edema.      Left lower leg: No edema.   Skin:     General: Skin is warm.   Neurological:      Mental Status: He is alert and  oriented to person, place, and time.         Relevant Results  Scheduled medications  buPROPion SR, 200 mg, oral, Daily  divalproex, 250 mg, oral, Nightly  heparin (porcine), 5,000 Units, subcutaneous, q8h ZOHRA  polyethylene glycol, 17 g, oral, Daily  prednisoLONE acetate, 1 drop, Both Eyes, 4x daily  venlafaxine, 37.5 mg, oral, Daily  vitamin B complex-vitamin C-folic acid, 1 capsule, oral, Daily      Continuous medications     PRN medications  PRN medications: midodrine, ondansetron  Results for orders placed or performed during the hospital encounter of 04/03/25 (from the past 24 hours)   Hepatitis B surface antigen   Result Value Ref Range    Hepatitis B Surface AG Nonreactive Nonreactive   POCT GLUCOSE   Result Value Ref Range    POCT Glucose 89 74 - 99 mg/dL   ECG 12 Lead   Result Value Ref Range    Ventricular Rate 70 BPM    Atrial Rate 70 BPM    PA Interval 154 ms    QRS Duration 106 ms    QT Interval 442 ms    QTC Calculation(Bazett) 477 ms    P Axis 2 degrees    R Axis -49 degrees    T Axis 44 degrees    QRS Count 12 beats    Q Onset 208 ms    P Onset 131 ms    P Offset 162 ms    T Offset 429 ms    QTC Fredericia 465 ms   CBC   Result Value Ref Range    WBC 4.6 4.4 - 11.3 x10*3/uL    nRBC 0.0 0.0 - 0.0 /100 WBCs    RBC 3.39 (L) 4.50 - 5.90 x10*6/uL    Hemoglobin 11.5 (L) 13.5 - 17.5 g/dL    Hematocrit 35.5 (L) 41.0 - 52.0 %     (H) 80 - 100 fL    MCH 33.9 26.0 - 34.0 pg    MCHC 32.4 32.0 - 36.0 g/dL    RDW 13.5 11.5 - 14.5 %    Platelets 80 (L) 150 - 450 x10*3/uL   Magnesium   Result Value Ref Range    Magnesium 2.28 1.60 - 2.40 mg/dL   Renal Function Panel   Result Value Ref Range    Glucose 72 (L) 74 - 99 mg/dL    Sodium 138 136 - 145 mmol/L    Potassium 3.9 3.5 - 5.3 mmol/L    Chloride 96 (L) 98 - 107 mmol/L    Bicarbonate 29 21 - 32 mmol/L    Anion Gap 17 10 - 20 mmol/L    Urea Nitrogen 20 6 - 23 mg/dL    Creatinine 2.92 (H) 0.50 - 1.30 mg/dL    eGFR 22 (L) >60 mL/min/1.73m*2    Calcium 9.4 8.6 -  10.3 mg/dL    Phosphorus 3.8 2.5 - 4.9 mg/dL    Albumin 4.4 3.4 - 5.0 g/dL     ECG 12 Lead    Result Date: 4/5/2025  Normal sinus rhythm Left anterior fascicular block Septal infarct (cited on or before 11-OCT-2023) Possible Lateral infarct (cited on or before 11-OCT-2023) Abnormal ECG When compared with ECG of 11-OCT-2023 17:58, Criteria for Inferior infarct are no longer Present Questionable change in initial forces of Anteroseptal leads Nonspecific T wave abnormality no longer evident in Inferior leads Nonspecific T wave abnormality no longer evident in Anterolateral leads         Assessment/Plan    Mr. John W Nyhan is a 74 y.o. male with PMH significant for ESRD on HD MWF secondary to remote hx of Lithium use, HTN, HLD, CAD, bipolar disorder, gout, and aortic stenosis who presented to [facility] ED from home on 4/30/2025 with difficulty taking care of himself at home.  HDS on arrival to the ED today.  Labs fairly unremarkable.  Social work consulted from the ED and recommended placement.  Patient is agreeable to going to [facility] where he has been previously.  He was admitted to [facility] under teaching for PT/OT evaluation and placement to SNF.    - PT/OT recommends SNF however patient does not want to discharge to skilled nursing Private pay.,  Long-term care facility or assisted living.  He does not want to pursue Medicaid billing process.  Patient wants to discharge home with home health care however today he changed his mind and stated that he cannot live by himself and he needs placement    Assessment & Plan     #Failure to thrive  -Medically stable  -SW involved for placement , of note patient is beginning 1 to go home with home health care however he changes his mind today and he need placement.  -PT/OT consulted recommended SNF     #ESRD on HD MWF from remote hx of Lithium use (AV fistula)  -Patient of Dr. Weems and receives dialysis at [facility] on MWF  -Nephrology consulted for HD, appreciate  recs  -Midodrine as needed with dialysis  -Renal diet     #Right inguinal hernia  -Hernia is reducible on examination without any tenderness to palpation  -Will need referral to general surgery on discharge for R inguinal hernia         CHRONIC PROBLEMS:  # HTN  # HLD  # CAD  # Bipolar disorder  # Gout  # Aortic stenosis   - Continue home medications as appropriate     Global Plan of Care  IVF: as needed  Electrolytes: Replete with goal K>4 and Mg>2  Diet: renal  DVT prophylaxis: subcutaneous heparin   Bowel regime: miralax  Consults: PT/OT, SW, Nephro  Code Status: FULL     Dispo: Anticipate 1-2 midnight stays pending PT/OT eval and placement       Nelson Aleman MD  Internal medicine, PGY1

## 2025-04-05 NOTE — NURSING NOTE
2330= jefferson tech reported pt. Anxious crying , because he is worried going home alone.  Teaching service was notified. Atarax 25mg po x1 given at 2341. Still having dialysis.

## 2025-04-05 NOTE — NURSING NOTE
Pt. Slept  fairly after he received the atarax for anxiety. Tolerated dialysis fairly. Pt. Was anxious during the , was afraid to go home alone. Told pt. Will pass on day shift.

## 2025-04-06 VITALS
TEMPERATURE: 97.5 F | RESPIRATION RATE: 16 BRPM | OXYGEN SATURATION: 100 % | HEART RATE: 86 BPM | BODY MASS INDEX: 18.71 KG/M2 | SYSTOLIC BLOOD PRESSURE: 132 MMHG | DIASTOLIC BLOOD PRESSURE: 81 MMHG | WEIGHT: 123.46 LBS | HEIGHT: 68 IN

## 2025-04-06 LAB
ALBUMIN SERPL BCP-MCNC: 3.9 G/DL (ref 3.4–5)
ANION GAP SERPL CALC-SCNC: 18 MMOL/L (ref 10–20)
BUN SERPL-MCNC: 55 MG/DL (ref 6–23)
CALCIUM SERPL-MCNC: 9.1 MG/DL (ref 8.6–10.3)
CHLORIDE SERPL-SCNC: 93 MMOL/L (ref 98–107)
CO2 SERPL-SCNC: 29 MMOL/L (ref 21–32)
CREAT SERPL-MCNC: 4.93 MG/DL (ref 0.5–1.3)
EGFRCR SERPLBLD CKD-EPI 2021: 12 ML/MIN/1.73M*2
ERYTHROCYTE [DISTWIDTH] IN BLOOD BY AUTOMATED COUNT: 13.7 % (ref 11.5–14.5)
GLUCOSE SERPL-MCNC: 89 MG/DL (ref 74–99)
HCT VFR BLD AUTO: 31.3 % (ref 41–52)
HGB BLD-MCNC: 10.2 G/DL (ref 13.5–17.5)
MAGNESIUM SERPL-MCNC: 2.37 MG/DL (ref 1.6–2.4)
MCH RBC QN AUTO: 34 PG (ref 26–34)
MCHC RBC AUTO-ENTMCNC: 32.6 G/DL (ref 32–36)
MCV RBC AUTO: 104 FL (ref 80–100)
NRBC BLD-RTO: 0 /100 WBCS (ref 0–0)
PHOSPHATE SERPL-MCNC: 4.6 MG/DL (ref 2.5–4.9)
PLATELET # BLD AUTO: 78 X10*3/UL (ref 150–450)
POTASSIUM SERPL-SCNC: 4.7 MMOL/L (ref 3.5–5.3)
RBC # BLD AUTO: 3 X10*6/UL (ref 4.5–5.9)
SODIUM SERPL-SCNC: 135 MMOL/L (ref 136–145)
WBC # BLD AUTO: 5.1 X10*3/UL (ref 4.4–11.3)

## 2025-04-06 PROCEDURE — 2500000004 HC RX 250 GENERAL PHARMACY W/ HCPCS (ALT 636 FOR OP/ED)

## 2025-04-06 PROCEDURE — G0378 HOSPITAL OBSERVATION PER HR: HCPCS

## 2025-04-06 PROCEDURE — 36415 COLL VENOUS BLD VENIPUNCTURE: CPT

## 2025-04-06 PROCEDURE — 2500000001 HC RX 250 WO HCPCS SELF ADMINISTERED DRUGS (ALT 637 FOR MEDICARE OP)

## 2025-04-06 PROCEDURE — 96372 THER/PROPH/DIAG INJ SC/IM: CPT

## 2025-04-06 PROCEDURE — 2500000001 HC RX 250 WO HCPCS SELF ADMINISTERED DRUGS (ALT 637 FOR MEDICARE OP): Performed by: INTERNAL MEDICINE

## 2025-04-06 PROCEDURE — 80069 RENAL FUNCTION PANEL: CPT

## 2025-04-06 PROCEDURE — 85027 COMPLETE CBC AUTOMATED: CPT

## 2025-04-06 PROCEDURE — 99231 SBSQ HOSP IP/OBS SF/LOW 25: CPT

## 2025-04-06 PROCEDURE — 83735 ASSAY OF MAGNESIUM: CPT

## 2025-04-06 RX ADMIN — HEPARIN SODIUM 5000 UNITS: 5000 INJECTION, SOLUTION INTRAVENOUS; SUBCUTANEOUS at 22:06

## 2025-04-06 RX ADMIN — HEPARIN SODIUM 5000 UNITS: 5000 INJECTION, SOLUTION INTRAVENOUS; SUBCUTANEOUS at 15:06

## 2025-04-06 RX ADMIN — PREDNISOLONE ACETATE 1 DROP: 10 SUSPENSION/ DROPS OPHTHALMIC at 08:25

## 2025-04-06 RX ADMIN — DIVALPROEX SODIUM 250 MG: 250 TABLET, EXTENDED RELEASE ORAL at 20:30

## 2025-04-06 RX ADMIN — VENLAFAXINE 37.5 MG: 37.5 TABLET ORAL at 08:25

## 2025-04-06 RX ADMIN — PREDNISOLONE ACETATE 1 DROP: 10 SUSPENSION/ DROPS OPHTHALMIC at 16:32

## 2025-04-06 RX ADMIN — PREDNISOLONE ACETATE 1 DROP: 10 SUSPENSION/ DROPS OPHTHALMIC at 13:35

## 2025-04-06 RX ADMIN — HEPARIN SODIUM 5000 UNITS: 5000 INJECTION, SOLUTION INTRAVENOUS; SUBCUTANEOUS at 06:01

## 2025-04-06 RX ADMIN — Medication 1 CAPSULE: at 08:25

## 2025-04-06 RX ADMIN — POLYETHYLENE GLYCOL 3350 17 G: 17 POWDER, FOR SOLUTION ORAL at 08:23

## 2025-04-06 RX ADMIN — BUPROPION HYDROCHLORIDE 200 MG: 100 TABLET, FILM COATED, EXTENDED RELEASE ORAL at 08:24

## 2025-04-06 RX ADMIN — PREDNISOLONE ACETATE 1 DROP: 10 SUSPENSION/ DROPS OPHTHALMIC at 20:30

## 2025-04-06 ASSESSMENT — COGNITIVE AND FUNCTIONAL STATUS - GENERAL
DRESSING REGULAR LOWER BODY CLOTHING: A LITTLE
MOVING TO AND FROM BED TO CHAIR: A LITTLE
HELP NEEDED FOR BATHING: A LITTLE
DRESSING REGULAR LOWER BODY CLOTHING: A LITTLE
DAILY ACTIVITIY SCORE: 19
DRESSING REGULAR UPPER BODY CLOTHING: A LITTLE
WALKING IN HOSPITAL ROOM: A LITTLE
MOBILITY SCORE: 20
TOILETING: A LITTLE
DRESSING REGULAR UPPER BODY CLOTHING: A LITTLE
CLIMB 3 TO 5 STEPS WITH RAILING: A LITTLE
CLIMB 3 TO 5 STEPS WITH RAILING: A LITTLE
STANDING UP FROM CHAIR USING ARMS: A LITTLE
WALKING IN HOSPITAL ROOM: A LITTLE
MOBILITY SCORE: 20
MOVING TO AND FROM BED TO CHAIR: A LITTLE
HELP NEEDED FOR BATHING: A LITTLE
PERSONAL GROOMING: A LITTLE
PERSONAL GROOMING: A LITTLE
TOILETING: A LITTLE
DAILY ACTIVITIY SCORE: 19
STANDING UP FROM CHAIR USING ARMS: A LITTLE

## 2025-04-06 ASSESSMENT — PAIN SCALES - GENERAL
PAINLEVEL_OUTOF10: 0 - NO PAIN
PAINLEVEL_OUTOF10: 0 - NO PAIN

## 2025-04-06 ASSESSMENT — PAIN - FUNCTIONAL ASSESSMENT: PAIN_FUNCTIONAL_ASSESSMENT: 0-10

## 2025-04-06 NOTE — PROGRESS NOTES
John W Nyhan is a 74 y.o. male on day 0 of admission presenting with Failure to thrive in adult.      Subjective   Patient assessed and examined at bedside.  No acute overnight event.  Patient denied fever, chills, nausea vomiting, abdominal pain, chest pain, shortness of breath.  Last bowel movement yesterday [normal].  Patient accepted by [facility], still waiting for acceptance from their dialysis unit       Objective     Last Recorded Vitals  /90 (BP Location: Right arm, Patient Position: Sitting)   Pulse 80   Temp 36.6 °C (97.9 °F) (Temporal)   Resp 20   Wt 56 kg (123 lb 7.3 oz)   SpO2 95%   Intake/Output last 3 Shifts:    Intake/Output Summary (Last 24 hours) at 4/6/2025 1211  Last data filed at 4/5/2025 1930  Gross per 24 hour   Intake 480 ml   Output --   Net 480 ml       Admission Weight  Weight: 56.2 kg (124 lb) (04/03/25 1048)    Daily Weight  04/03/25 : 56 kg (123 lb 7.3 oz)    Image Results  ECG 12 Lead  Normal sinus rhythm  Left anterior fascicular block  Septal infarct (cited on or before 11-OCT-2023)  Possible Lateral infarct (cited on or before 11-OCT-2023)  Abnormal ECG  When compared with ECG of 11-OCT-2023 17:58,  Criteria for Inferior infarct are no longer Present  Questionable change in initial forces of Anteroseptal leads  Nonspecific T wave abnormality no longer evident in Inferior leads  Nonspecific T wave abnormality no longer evident in Anterolateral leads      Physical Exam  HENT:      Head: Normocephalic and atraumatic.      Mouth/Throat:      Mouth: Mucous membranes are moist.   Cardiovascular:      Heart sounds: No murmur heard.     No gallop.   Pulmonary:      Effort: No respiratory distress.      Breath sounds: No rhonchi.   Abdominal:      Palpations: Abdomen is soft.      Tenderness: There is no abdominal tenderness.   Musculoskeletal:      Right lower leg: No edema.      Left lower leg: No edema.   Skin:     General: Skin is warm.   Neurological:      Mental  Status: He is alert and oriented to person, place, and time.      Motor: No weakness.         Relevant Results  Scheduled medications  buPROPion SR, 200 mg, oral, Daily  divalproex, 250 mg, oral, Nightly  heparin (porcine), 5,000 Units, subcutaneous, q8h ZOHRA  polyethylene glycol, 17 g, oral, Daily  prednisoLONE acetate, 1 drop, Both Eyes, 4x daily  venlafaxine, 37.5 mg, oral, Daily  vitamin B complex-vitamin C-folic acid, 1 capsule, oral, Daily      Continuous medications     PRN medications  PRN medications: midodrine, ondansetron  Results for orders placed or performed during the hospital encounter of 04/03/25 (from the past 24 hours)   CBC   Result Value Ref Range    WBC 5.1 4.4 - 11.3 x10*3/uL    nRBC 0.0 0.0 - 0.0 /100 WBCs    RBC 3.00 (L) 4.50 - 5.90 x10*6/uL    Hemoglobin 10.2 (L) 13.5 - 17.5 g/dL    Hematocrit 31.3 (L) 41.0 - 52.0 %     (H) 80 - 100 fL    MCH 34.0 26.0 - 34.0 pg    MCHC 32.6 32.0 - 36.0 g/dL    RDW 13.7 11.5 - 14.5 %    Platelets 78 (L) 150 - 450 x10*3/uL   Magnesium   Result Value Ref Range    Magnesium 2.37 1.60 - 2.40 mg/dL   Renal Function Panel   Result Value Ref Range    Glucose 89 74 - 99 mg/dL    Sodium 135 (L) 136 - 145 mmol/L    Potassium 4.7 3.5 - 5.3 mmol/L    Chloride 93 (L) 98 - 107 mmol/L    Bicarbonate 29 21 - 32 mmol/L    Anion Gap 18 10 - 20 mmol/L    Urea Nitrogen 55 (H) 6 - 23 mg/dL    Creatinine 4.93 (H) 0.50 - 1.30 mg/dL    eGFR 12 (L) >60 mL/min/1.73m*2    Calcium 9.1 8.6 - 10.3 mg/dL    Phosphorus 4.6 2.5 - 4.9 mg/dL    Albumin 3.9 3.4 - 5.0 g/dL     ECG 12 Lead    Result Date: 4/5/2025  Normal sinus rhythm Left anterior fascicular block Septal infarct (cited on or before 11-OCT-2023) Possible Lateral infarct (cited on or before 11-OCT-2023) Abnormal ECG When compared with ECG of 11-OCT-2023 17:58, Criteria for Inferior infarct are no longer Present Questionable change in initial forces of Anteroseptal leads Nonspecific T wave abnormality no longer evident in  Inferior leads Nonspecific T wave abnormality no longer evident in Anterolateral leads            Assessment/Plan    Mr. John W Nyhan is a 74 y.o. male with PMH significant for ESRD on HD MWF secondary to remote hx of Lithium use, HTN, HLD, CAD, bipolar disorder, gout, and aortic stenosis who presented to [facility] ED from home on 4/30/2025 with difficulty taking care of himself at home.  HDS on arrival to the ED today.  Labs fairly unremarkable.  Social work consulted from the ED and recommended placement.  Patient is agreeable to going to [facility] where he has been previously.  He was admitted to [facility] under teaching for PT/OT evaluation and placement to SNF.     - PT/OT recommends SNF however patient does not want to discharge to skilled nursing Private pay.,  Long-term care facility or assisted living.  He does not want to pursue Medicaid billing process.  Patient wants to discharge home with home health care however today he changed his mind and stated that he cannot live by himself and he needs placement     Assessment & Plan     #Failure to thrive  -Medically stable  -SW involved for placement , of note patient is beginning 1 to go home with home health care however he changes his mind today and he need placement.  -PT/OT consulted recommended SNF.  Patient accepted by [facility] however still waiting for their dialysis unit acceptance.     #ESRD on HD MWF from remote hx of Lithium use (AV fistula)  -Patient of Dr. Weems and receives dialysis at [facility] on MWF  -ESRD 4/7  -Nephrology consulted for HD, appreciate recs  -Midodrine as needed with dialysis  -Renal diet     #Right inguinal hernia  -Hernia is reducible on examination without any tenderness to palpation  -Will need referral to general surgery on discharge for R inguinal hernia         CHRONIC PROBLEMS:  # HTN  # HLD  # CAD  # Bipolar disorder  # Gout  # Aortic stenosis   - Continue home medications as appropriate     Global  Plan of Care  IVF: as needed  Electrolytes: Replete with goal K>4 and Mg>2  Diet: renal  DVT prophylaxis: subcutaneous heparin   Bowel regime: miralax  Consults: PT/OT, SW, Nephro  Code Status: FULL     Dispo: Anticipate 1-2 midnight stays pending PT/OT eval and placement         Nelson Aleman MD  Internal medicine, PGY1

## 2025-04-06 NOTE — PROGRESS NOTES
John W Nyhan is a 74 y.o. male on day 0 of admission presenting with Failure to thrive in adult.      Subjective   Interval events reviewed, no cp/sob     Medication Documentation Review Audit       Reviewed by Samuel Rodriguez (Technician) on 25 at 1440      Medication Order Taking? Sig Documenting Provider Last Dose Status   buPROPion SR (Wellbutrin SR) 200 mg 12 hr tablet [id number] Yes Take 1 tablet (200 mg) by mouth once daily. Do not crush, chew, or split. Yulissa Velasquez MD 4/3/2025 Morning Active   divalproex (Depakote ER) 250 mg 24 hr tablet [id number] Yes Take 1 tablet (250 mg) by mouth once daily at bedtime. Do not crush, chew, or split. Yulissa Velasquez MD 2025 Bedtime Active   midodrine (Proamatine) 10 mg tablet [id number] Yes Take 1 tablet (10 mg) by mouth once daily as needed (for low BP). Historical Provider, MD Past Week Active   multivitamin (Daily-Kobi, with folic acid,) tablet [id number] Yes Take 1 tablet by mouth once daily. Yulissa Provider, MD 4/3/2025 Morning Active   prednisoLONE acetate (Pred-Forte) 1 % ophthalmic suspension [id number] Yes Administer 1 drop into both eyes 4 times a day. Yulissa Provider, MD 2025 Morning Active   traZODone (Desyrel) 50 mg tablet [id number] No Take 1 tablet (50 mg) by mouth once daily at bedtime.   Patient not taking: Reported on 4/3/2025    Rd Bianchi, DO Not Taking  23   venlafaxine (Effexor) 37.5 mg tablet [id number]  Take 1 tablet (37.5 mg) by mouth once daily. Historical Provider, MD  Active   venlafaxine XR (Effexor-XR) 150 mg 24 hr capsule [id number] No Take 1 capsule (150 mg) by mouth once daily with a meal. Do not crush or chew. Do not start before 2023.   Patient not taking: Reported on 4/3/2025    Rd Bianchi, DO Not Taking  11/15/23 0099                    Objective          Vitals 24HR  Heart Rate:  [78-95]   Temp:  [36.4 °C (97.5 °F)-37.5 °C (99.5 °F)]   Resp:  [16-20]   BP:  (102-148)/(57-90)   SpO2:  [94 %-100 %]     Intake/Output last 3 Shifts:    Intake/Output Summary (Last 24 hours) at 4/6/2025 1041  Last data filed at 4/5/2025 1930  Gross per 24 hour   Intake 480 ml   Output --   Net 480 ml       Physical Exam    Relevant Results      Neck: supple  Lung: no wheeze  Cv: rrs1s2  exT: no c/c           Assessment/Plan        Assessment  ESRD on HD MWF  Anemia of ckd  H/o HTN  Hyperphosphatemia   2HPT  Mild hyponatremia     Plan   Next Hd 4/7  Await decision on SNF  UF as BP allows      Tank Weems MD

## 2025-04-06 NOTE — PROGRESS NOTES
Pt was accepted by [facility] and they do have a male long term care bed available as well. Their dialysis unit still needs to review for acceptance.    Direct precert team initiated precert.  CHAZ Florez

## 2025-04-07 ENCOUNTER — APPOINTMENT (OUTPATIENT)
Dept: DIALYSIS | Facility: HOSPITAL | Age: 75
End: 2025-04-07
Payer: COMMERCIAL

## 2025-04-07 LAB
ALBUMIN SERPL BCP-MCNC: 4 G/DL (ref 3.4–5)
ANION GAP SERPL CALC-SCNC: 20 MMOL/L (ref 10–20)
BUN SERPL-MCNC: 70 MG/DL (ref 6–23)
CALCIUM SERPL-MCNC: 9.6 MG/DL (ref 8.6–10.3)
CHLORIDE SERPL-SCNC: 91 MMOL/L (ref 98–107)
CO2 SERPL-SCNC: 25 MMOL/L (ref 21–32)
CREAT SERPL-MCNC: 5.91 MG/DL (ref 0.5–1.3)
EGFRCR SERPLBLD CKD-EPI 2021: 9 ML/MIN/1.73M*2
ERYTHROCYTE [DISTWIDTH] IN BLOOD BY AUTOMATED COUNT: 13.4 % (ref 11.5–14.5)
GLUCOSE SERPL-MCNC: 74 MG/DL (ref 74–99)
HCT VFR BLD AUTO: 31.8 % (ref 41–52)
HGB BLD-MCNC: 10.3 G/DL (ref 13.5–17.5)
MAGNESIUM SERPL-MCNC: 2.55 MG/DL (ref 1.6–2.4)
MCH RBC QN AUTO: 33.8 PG (ref 26–34)
MCHC RBC AUTO-ENTMCNC: 32.4 G/DL (ref 32–36)
MCV RBC AUTO: 104 FL (ref 80–100)
NRBC BLD-RTO: 0 /100 WBCS (ref 0–0)
PHOSPHATE SERPL-MCNC: 4.9 MG/DL (ref 2.5–4.9)
PLATELET # BLD AUTO: 69 X10*3/UL (ref 150–450)
POTASSIUM SERPL-SCNC: 5.4 MMOL/L (ref 3.5–5.3)
RBC # BLD AUTO: 3.05 X10*6/UL (ref 4.5–5.9)
SODIUM SERPL-SCNC: 131 MMOL/L (ref 136–145)
WBC # BLD AUTO: 4.8 X10*3/UL (ref 4.4–11.3)

## 2025-04-07 PROCEDURE — G0378 HOSPITAL OBSERVATION PER HR: HCPCS

## 2025-04-07 PROCEDURE — 99231 SBSQ HOSP IP/OBS SF/LOW 25: CPT

## 2025-04-07 PROCEDURE — 2500000001 HC RX 250 WO HCPCS SELF ADMINISTERED DRUGS (ALT 637 FOR MEDICARE OP): Performed by: INTERNAL MEDICINE

## 2025-04-07 PROCEDURE — 2500000004 HC RX 250 GENERAL PHARMACY W/ HCPCS (ALT 636 FOR OP/ED)

## 2025-04-07 PROCEDURE — 80069 RENAL FUNCTION PANEL: CPT

## 2025-04-07 PROCEDURE — 90937 HEMODIALYSIS REPEATED EVAL: CPT

## 2025-04-07 PROCEDURE — 96372 THER/PROPH/DIAG INJ SC/IM: CPT | Mod: 59

## 2025-04-07 PROCEDURE — 36415 COLL VENOUS BLD VENIPUNCTURE: CPT

## 2025-04-07 PROCEDURE — 85027 COMPLETE CBC AUTOMATED: CPT

## 2025-04-07 PROCEDURE — 2500000001 HC RX 250 WO HCPCS SELF ADMINISTERED DRUGS (ALT 637 FOR MEDICARE OP)

## 2025-04-07 PROCEDURE — 83735 ASSAY OF MAGNESIUM: CPT

## 2025-04-07 RX ADMIN — BUPROPION HYDROCHLORIDE 200 MG: 100 TABLET, FILM COATED, EXTENDED RELEASE ORAL at 13:56

## 2025-04-07 RX ADMIN — DIVALPROEX SODIUM 250 MG: 250 TABLET, EXTENDED RELEASE ORAL at 20:09

## 2025-04-07 RX ADMIN — VENLAFAXINE 37.5 MG: 37.5 TABLET ORAL at 13:54

## 2025-04-07 RX ADMIN — HEPARIN SODIUM 5000 UNITS: 5000 INJECTION, SOLUTION INTRAVENOUS; SUBCUTANEOUS at 13:54

## 2025-04-07 RX ADMIN — HEPARIN SODIUM 5000 UNITS: 5000 INJECTION, SOLUTION INTRAVENOUS; SUBCUTANEOUS at 21:28

## 2025-04-07 RX ADMIN — PREDNISOLONE ACETATE 1 DROP: 10 SUSPENSION/ DROPS OPHTHALMIC at 06:10

## 2025-04-07 RX ADMIN — POLYETHYLENE GLYCOL 3350 17 G: 17 POWDER, FOR SOLUTION ORAL at 13:55

## 2025-04-07 RX ADMIN — PREDNISOLONE ACETATE 1 DROP: 10 SUSPENSION/ DROPS OPHTHALMIC at 14:09

## 2025-04-07 RX ADMIN — PREDNISOLONE ACETATE 1 DROP: 10 SUSPENSION/ DROPS OPHTHALMIC at 17:36

## 2025-04-07 RX ADMIN — PREDNISOLONE ACETATE 1 DROP: 10 SUSPENSION/ DROPS OPHTHALMIC at 20:09

## 2025-04-07 RX ADMIN — HEPARIN SODIUM 5000 UNITS: 5000 INJECTION, SOLUTION INTRAVENOUS; SUBCUTANEOUS at 06:10

## 2025-04-07 RX ADMIN — Medication 1 CAPSULE: at 13:55

## 2025-04-07 ASSESSMENT — COGNITIVE AND FUNCTIONAL STATUS - GENERAL
CLIMB 3 TO 5 STEPS WITH RAILING: A LITTLE
TOILETING: A LITTLE
WALKING IN HOSPITAL ROOM: A LITTLE
DRESSING REGULAR LOWER BODY CLOTHING: A LITTLE
MOVING TO AND FROM BED TO CHAIR: A LITTLE
MOBILITY SCORE: 20
DRESSING REGULAR UPPER BODY CLOTHING: A LITTLE
HELP NEEDED FOR BATHING: A LITTLE
PERSONAL GROOMING: A LITTLE
STANDING UP FROM CHAIR USING ARMS: A LITTLE
DAILY ACTIVITIY SCORE: 19

## 2025-04-07 ASSESSMENT — PAIN SCALES - GENERAL
PAINLEVEL_OUTOF10: 0 - NO PAIN

## 2025-04-07 ASSESSMENT — PAIN - FUNCTIONAL ASSESSMENT: PAIN_FUNCTIONAL_ASSESSMENT: NO/DENIES PAIN

## 2025-04-07 NOTE — CARE PLAN
Problem: Skin  Goal: Decreased wound size/increased tissue granulation at next dressing change  Flowsheets (Taken 4/6/2025 2133)  Decreased wound size/increased tissue granulation at next dressing change: Promote sleep for wound healing  Goal: Participates in plan/prevention/treatment measures  Flowsheets (Taken 4/6/2025 2133)  Participates in plan/prevention/treatment measures:   Discuss with provider PT/OT consult   Elevate heels   Increase activity/out of bed for meals  Goal: Prevent/manage excess moisture  Flowsheets (Taken 4/6/2025 2133)  Prevent/manage excess moisture: Use wicking fabric (obtain order)  Goal: Prevent/minimize sheer/friction injuries  Flowsheets (Taken 4/6/2025 2133)  Prevent/minimize sheer/friction injuries:   Use pull sheet   HOB 30 degrees or less   Turn/reposition every 2 hours/use positioning/transfer devices  Goal: Promote/optimize nutrition  Flowsheets (Taken 4/6/2025 2133)  Promote/optimize nutrition:   Monitor/record intake including meals   Offer water/supplements/favorite foods  Goal: Promote skin healing  Flowsheets (Taken 4/6/2025 2133)  Promote skin healing: Rotate device position/do not position patient on device   The patient's goals for the shift include pt.  want  to  feel  better    The clinical goals for the shift include PT.WILL BE SEEN BY PCP/CONSULTS,RECIEVE PRESCRIBED MEDS/TX'S.    Over the shift, the patient did not make progress toward the following goals. Barriers to progression include ***. Recommendations to address these barriers include ***.

## 2025-04-07 NOTE — PRE-PROCEDURE NOTE
Report from Sending RN:    Report From: PIOTR Layton  Recent Surgery of Procedure: No  Baseline Level of Consciousness (LOC): A&O X4  Oxygen Use: No  Type: Room Air  Diabetic: No  Last BP Med Given Day of Dialysis: NONE  Last Pain Med Given: NONE  Lab Tests to be Obtained with Dialysis: No  Blood Transfusion to be Given During Dialysis: No  Available IV Access: Yes  Medications to be Administered During Dialysis: No  Continuous IV Infusion Running: No  Restraints on Currently or in the Last 24 Hours: No  Hand-Off Communication: FULL CODE

## 2025-04-07 NOTE — CARE PLAN
The patient's goals for the shift include pt.  want  to  feel  better    The clinical goals for the shift include BP to remain within normal limits

## 2025-04-07 NOTE — PROGRESS NOTES
04/07/25 1319   Discharge Planning   Home or Post Acute Services Post acute facilities (Rehab/SNF/etc)   Type of Post Acute Facility Services Skilled nursing   Expected Discharge Disposition SNF     Advised the pt that [facility] accepted and precert was started, however, he will need to pay the outstanding balance of $2890 that he has with [facility] before he can be admitted. The pt now states that he wants to go to [facility] at the recommendation of his nephrologist who will follow him for his dialysis at [facility]. Sent the referral. Advised the pt that Saundra, [facility] liaison, will meet with his this afternoon regarding payment of the outstanding balance. Await precert. If [facility] accepts and precert is received, the precert will need to be changed to [facility].,     1600 Spoke with Saundra from [facility]. [facility] has declined to accept. The only [facility] facility available to the pt is [facility]. Saundra also says that the pt's nephrologist is welcome to  follow the pt at [facility] if he would like. Updated the pt. Await precert.    1700 The insurance is requesting a peer to peer tomorrow. Updated the physician who will call in the am.    1730 Peer to peer upheld the denial.Will ask therapy to see the pt in the am.

## 2025-04-07 NOTE — PROGRESS NOTES
Spiritual Care Visit  Spiritual Care Request    Reason for Visit:  Routine Visit: Introduction  Continue Visiting: Yes     Request Received From:       Focus of Care:  Visited With: Patient         Refer to :          Spiritual Care Assessment    Spiritual Assessment:                      Care Provided:  Intended Effects: Establish rapport and connectedness, Meaning-making, Bobbi affirmation, Build relationship of care and support, Demonstrate caring and concern, Helping someone feel comforted  Methods: Offer spiritual/Zoroastrianism support  Interventions: Perform a Zoroastrianism rite or ritual, [place]    Sense of Community and or Restorationist Affiliation:  Oriental orthodox         Addressed Needs/Concerns and/or Evelyn Through:  Restorationist Encounters  Restorationist Needs: Prayer, Restorationist articles  Sacramental Encounters  Communion: Patient wants communion  Communion Given Indicator: No  Sacrament of Sick-Anointing: Anointed, Patient requested anointing    Outcome:  Outcome of Spiritual Care Visit: Affirmation, [place], Comfort/healing presence, Spirituality connected     Advance Directives:         Spiritual Care Annotation    Annotation:

## 2025-04-07 NOTE — PROGRESS NOTES
John W Nyhan is a 74 y.o. male on day 0 of admission presenting with Failure to thrive in adult.      Subjective   Events reviewed, no cp/sob     Medication Documentation Review Audit       Reviewed by Samuel Rodriguez (Technician) on 25 at 1440      Medication Order Taking? Sig Documenting Provider Last Dose Status   buPROPion SR (Wellbutrin SR) 200 mg 12 hr tablet [id number] Yes Take 1 tablet (200 mg) by mouth once daily. Do not crush, chew, or split. Yulissa Velasquez MD 4/3/2025 Morning Active   divalproex (Depakote ER) 250 mg 24 hr tablet [id number] Yes Take 1 tablet (250 mg) by mouth once daily at bedtime. Do not crush, chew, or split. Yulissa Velasquez MD 2025 Bedtime Active   midodrine (Proamatine) 10 mg tablet [id number] Yes Take 1 tablet (10 mg) by mouth once daily as needed (for low BP). Historical Provider, MD Past Week Active   multivitamin (Daily-Kobi, with folic acid,) tablet [id number] Yes Take 1 tablet by mouth once daily. Yulissa Provider, MD 4/3/2025 Morning Active   prednisoLONE acetate (Pred-Forte) 1 % ophthalmic suspension [id number] Yes Administer 1 drop into both eyes 4 times a day. Yulissa Provider, MD 2025 Morning Active   traZODone (Desyrel) 50 mg tablet [id number] No Take 1 tablet (50 mg) by mouth once daily at bedtime.   Patient not taking: Reported on 4/3/2025    Rd Bianchi, DO Not Taking  23 2359   venlafaxine (Effexor) 37.5 mg tablet [id number]  Take 1 tablet (37.5 mg) by mouth once daily. Historical Provider, MD  Active   venlafaxine XR (Effexor-XR) 150 mg 24 hr capsule [id number] No Take 1 capsule (150 mg) by mouth once daily with a meal. Do not crush or chew. Do not start before 2023.   Patient not taking: Reported on 4/3/2025    Rd Bianchi, DO Not Taking  11/15/23 2359                    Objective     Last Recorded Vitals  /77   Pulse 78   Temp 36.6 °C (97.9 °F) (Tympanic)   Resp 17   Wt 56 kg (123 lb 7.3 oz)   SpO2 97%    Intake/Output last 3 Shifts:    Intake/Output Summary (Last 24 hours) at 4/7/2025 1135  Last data filed at 4/7/2025 1130  Gross per 24 hour   Intake 1200 ml   Output --   Net 1200 ml       Admission Weight  Weight: 56.2 kg (124 lb) (04/03/25 1048)    Daily Weight  04/03/25 : 56 kg (123 lb 7.3 oz)    Image Results  ECG 12 Lead  Normal sinus rhythm  Left anterior fascicular block  Septal infarct (cited on or before 11-OCT-2023)  Possible Lateral infarct (cited on or before 11-OCT-2023)  Abnormal ECG  When compared with ECG of 11-OCT-2023 17:58,  Criteria for Inferior infarct are no longer Present  Questionable change in initial forces of Anteroseptal leads  Nonspecific T wave abnormality no longer evident in Inferior leads  Nonspecific T wave abnormality no longer evident in Anterolateral leads      Physical Exam    Neck: supple  Lung: no wheeze  Cv: rrs1s2  exT: no c/c    Relevant Results      Results for orders placed or performed during the hospital encounter of 04/03/25 (from the past 24 hours)   CBC   Result Value Ref Range    WBC 4.8 4.4 - 11.3 x10*3/uL    nRBC 0.0 0.0 - 0.0 /100 WBCs    RBC 3.05 (L) 4.50 - 5.90 x10*6/uL    Hemoglobin 10.3 (L) 13.5 - 17.5 g/dL    Hematocrit 31.8 (L) 41.0 - 52.0 %     (H) 80 - 100 fL    MCH 33.8 26.0 - 34.0 pg    MCHC 32.4 32.0 - 36.0 g/dL    RDW 13.4 11.5 - 14.5 %    Platelets 69 (L) 150 - 450 x10*3/uL   Renal Function Panel   Result Value Ref Range    Glucose 74 74 - 99 mg/dL    Sodium 131 (L) 136 - 145 mmol/L    Potassium 5.4 (H) 3.5 - 5.3 mmol/L    Chloride 91 (L) 98 - 107 mmol/L    Bicarbonate 25 21 - 32 mmol/L    Anion Gap 20 10 - 20 mmol/L    Urea Nitrogen 70 (H) 6 - 23 mg/dL    Creatinine 5.91 (H) 0.50 - 1.30 mg/dL    eGFR 9 (L) >60 mL/min/1.73m*2    Calcium 9.6 8.6 - 10.3 mg/dL    Phosphorus 4.9 2.5 - 4.9 mg/dL    Albumin 4.0 3.4 - 5.0 g/dL   Magnesium   Result Value Ref Range    Magnesium 2.55 (H) 1.60 - 2.40 mg/dL              Assessment/Plan         Assessment  ESRD on HD MWF  Anemia of ckd  H/o HTN  Hyperphosphatemia   2HPT  Mild hyponatremia     Plan   HD today as ordered  UF as BP allows  SNF placement           Tank Weems MD       no

## 2025-04-07 NOTE — POST-PROCEDURE NOTE
Notes/Events during Treatment: None    Report to Receiving RN:    Report To: PITOR Layton  Time Report Called:  1310  Hand-Off Communication to Receiving RN: Tx tolerated well. VSS  Complications During Treatment: No  Ultrafiltration Treatment: No  Medications Administered During Dialysis: No  Blood Products Administered During Dialysis: No  Labs Sent During Dialysis: No  Heparin Drip Rate Changes: No  Sending BP:    Dialysis Catheter Dressing Changed: N/A  Significant Events/Interventions: N/A  Provider Contacted/Time/Response/Orders: N/A  HD Orders Followed: Yes     Tx Initiated by/Time: [id number] OCDT  Tx Terminated by/Time: 1305Vinita RN  Fluid Removed: 1.5L    Electronic Signatures:       Authored: PIOTR Talamantes    Last Updated: 12:52 PM by CICI BOSWELL

## 2025-04-07 NOTE — NURSING NOTE
Patient completed dialysis, 1.5L removed, patient tolerated well.  Patient denies any discomfort. Encouraged to order lunch.

## 2025-04-07 NOTE — PROGRESS NOTES
John W Nyhan is a 74 y.o. male on day 0 of admission presenting with Failure to thrive in adult.      Subjective   Patient assessed and examined at bedside.  No acute overnight event.  Patient denied fever, chills, nausea vomiting, abdominal pain, chest pain, shortness of breath.  Patient is anxious, tearful at bedside.  However, reports he was too fatigued with Atarax.       Objective     Last Recorded Vitals  /77   Pulse 80   Temp 36.9 °C (98.4 °F) (Temporal)   Resp 17   Wt 56 kg (123 lb 7.3 oz)   SpO2 97%   Intake/Output last 3 Shifts:    Intake/Output Summary (Last 24 hours) at 4/7/2025 1439  Last data filed at 4/7/2025 1300  Gross per 24 hour   Intake 1800 ml   Output 3400 ml   Net -1600 ml       Admission Weight  Weight: 56.2 kg (124 lb) (04/03/25 1048)    Daily Weight  04/03/25 : 56 kg (123 lb 7.3 oz)    Image Results  ECG 12 Lead  Normal sinus rhythm  Left anterior fascicular block  Septal infarct (cited on or before 11-OCT-2023)  Possible Lateral infarct (cited on or before 11-OCT-2023)  Abnormal ECG  When compared with ECG of 11-OCT-2023 17:58,  Criteria for Inferior infarct are no longer Present  Questionable change in initial forces of Anteroseptal leads  Nonspecific T wave abnormality no longer evident in Inferior leads  Nonspecific T wave abnormality no longer evident in Anterolateral leads      Physical Exam  HENT:      Head: Normocephalic and atraumatic.      Mouth/Throat:      Mouth: Mucous membranes are moist.   Cardiovascular:      Rate and Rhythm: Normal rate and regular rhythm.      Heart sounds: No murmur heard.  Pulmonary:      Effort: No respiratory distress.   Abdominal:      Palpations: Abdomen is soft.      Tenderness: There is no abdominal tenderness.   Musculoskeletal:      Right lower leg: No edema.      Left lower leg: No edema.   Skin:     General: Skin is warm.   Neurological:      Mental Status: He is alert and oriented to person, place, and time.      Motor: No weakness.    Psychiatric:         Behavior: Behavior normal.      Comments: Anxious          Relevant Results  Scheduled medications  buPROPion SR, 200 mg, oral, Daily  divalproex, 250 mg, oral, Nightly  heparin (porcine), 5,000 Units, subcutaneous, q8h ZOHRA  polyethylene glycol, 17 g, oral, Daily  prednisoLONE acetate, 1 drop, Both Eyes, 4x daily  venlafaxine, 37.5 mg, oral, Daily  vitamin B complex-vitamin C-folic acid, 1 capsule, oral, Daily      Continuous medications     PRN medications  PRN medications: midodrine, ondansetron  Results for orders placed or performed during the hospital encounter of 04/03/25 (from the past 24 hours)   CBC   Result Value Ref Range    WBC 4.8 4.4 - 11.3 x10*3/uL    nRBC 0.0 0.0 - 0.0 /100 WBCs    RBC 3.05 (L) 4.50 - 5.90 x10*6/uL    Hemoglobin 10.3 (L) 13.5 - 17.5 g/dL    Hematocrit 31.8 (L) 41.0 - 52.0 %     (H) 80 - 100 fL    MCH 33.8 26.0 - 34.0 pg    MCHC 32.4 32.0 - 36.0 g/dL    RDW 13.4 11.5 - 14.5 %    Platelets 69 (L) 150 - 450 x10*3/uL   Renal Function Panel   Result Value Ref Range    Glucose 74 74 - 99 mg/dL    Sodium 131 (L) 136 - 145 mmol/L    Potassium 5.4 (H) 3.5 - 5.3 mmol/L    Chloride 91 (L) 98 - 107 mmol/L    Bicarbonate 25 21 - 32 mmol/L    Anion Gap 20 10 - 20 mmol/L    Urea Nitrogen 70 (H) 6 - 23 mg/dL    Creatinine 5.91 (H) 0.50 - 1.30 mg/dL    eGFR 9 (L) >60 mL/min/1.73m*2    Calcium 9.6 8.6 - 10.3 mg/dL    Phosphorus 4.9 2.5 - 4.9 mg/dL    Albumin 4.0 3.4 - 5.0 g/dL   Magnesium   Result Value Ref Range    Magnesium 2.55 (H) 1.60 - 2.40 mg/dL     ECG 12 Lead    Result Date: 4/5/2025  Normal sinus rhythm Left anterior fascicular block Septal infarct (cited on or before 11-OCT-2023) Possible Lateral infarct (cited on or before 11-OCT-2023) Abnormal ECG When compared with ECG of 11-OCT-2023 17:58, Criteria for Inferior infarct are no longer Present Questionable change in initial forces of Anteroseptal leads Nonspecific T wave abnormality no longer evident in  Inferior leads Nonspecific T wave abnormality no longer evident in Anterolateral leads            Assessment/Plan    Mr. John W Nyhan is a 74 y.o. male with PMH significant for ESRD on HD MWF secondary to remote hx of Lithium use, HTN, HLD, CAD, bipolar disorder, gout, and aortic stenosis who presented to [facility] ED from home on 4/30/2025 with difficulty taking care of himself at home.  HDS on arrival to the ED today.  Labs fairly unremarkable.  Social work consulted from the ED and recommended placement.  Patient is agreeable to going to [facility] where he has been previously.  He was admitted to [facility] under teaching for PT/OT evaluation and placement to SNF.     - PT/OT recommends SNF however patient initially did not wish to be discharge to skilled nursing Private pay. Now, patient is amenable to placement. Pending pre-cert; in need of peer to peer.        Assessment & Plan     #Failure to thrive  -Medically stable  -SW involved for placement   -PT/OT consulted recommended SNF.  Patient accepted by [facility] however still waiting for their dialysis unit acceptance.   - In need of peer to peer today for placement    #ESRD on HD MWF from remote hx of Lithium use (AV fistula)  -Patient of Dr. Weems and receives dialysis at [facility] on MWF  -ESRD 4/7  -Nephrology consulted for HD, appreciate recs  -Midodrine as needed with dialysis  -Renal diet    #Anxiety   - endorses anxiety, does not want ativan / atarax due to concern for drowsiness   - anxiety / depression contributory to patient difficulty w/ ADLs      #Right inguinal hernia  -Hernia is reducible on examination without any tenderness to palpation  -Will need referral to general surgery on discharge for R inguinal hernia         CHRONIC PROBLEMS:  # HTN  # HLD  # CAD  # Bipolar disorder  # Gout  # Aortic stenosis   - Continue home medications as appropriate     Global Plan of Care  IVF: as needed  Electrolytes: Replete with goal K>4 and  Mg>2  Diet: renal  DVT prophylaxis: subcutaneous heparin   Bowel regime: miralax  Consults: PT/OT, SW, Nephro  Code Status: FULL     Dispo: Anticipate discharge pending pre-cert / peer to peer         Leo Guerra MD  Family Medicine, PGY1

## 2025-04-08 VITALS
DIASTOLIC BLOOD PRESSURE: 60 MMHG | WEIGHT: 123.46 LBS | SYSTOLIC BLOOD PRESSURE: 108 MMHG | HEIGHT: 68 IN | HEART RATE: 70 BPM | TEMPERATURE: 97.9 F | BODY MASS INDEX: 18.71 KG/M2 | RESPIRATION RATE: 18 BRPM | OXYGEN SATURATION: 97 %

## 2025-04-08 LAB
ALBUMIN SERPL BCP-MCNC: 4.1 G/DL (ref 3.4–5)
ANION GAP SERPL CALC-SCNC: 16 MMOL/L (ref 10–20)
BUN SERPL-MCNC: 37 MG/DL (ref 6–23)
CALCIUM SERPL-MCNC: 9.6 MG/DL (ref 8.6–10.3)
CHLORIDE SERPL-SCNC: 96 MMOL/L (ref 98–107)
CO2 SERPL-SCNC: 29 MMOL/L (ref 21–32)
CREAT SERPL-MCNC: 3.77 MG/DL (ref 0.5–1.3)
EGFRCR SERPLBLD CKD-EPI 2021: 16 ML/MIN/1.73M*2
ERYTHROCYTE [DISTWIDTH] IN BLOOD BY AUTOMATED COUNT: 13.5 % (ref 11.5–14.5)
GLUCOSE SERPL-MCNC: 77 MG/DL (ref 74–99)
HCT VFR BLD AUTO: 33 % (ref 41–52)
HGB BLD-MCNC: 10.8 G/DL (ref 13.5–17.5)
MAGNESIUM SERPL-MCNC: 2.4 MG/DL (ref 1.6–2.4)
MCH RBC QN AUTO: 34.2 PG (ref 26–34)
MCHC RBC AUTO-ENTMCNC: 32.7 G/DL (ref 32–36)
MCV RBC AUTO: 104 FL (ref 80–100)
NRBC BLD-RTO: 0 /100 WBCS (ref 0–0)
PHOSPHATE SERPL-MCNC: 4.3 MG/DL (ref 2.5–4.9)
PLATELET # BLD AUTO: 78 X10*3/UL (ref 150–450)
POTASSIUM SERPL-SCNC: 4.7 MMOL/L (ref 3.5–5.3)
RBC # BLD AUTO: 3.16 X10*6/UL (ref 4.5–5.9)
SODIUM SERPL-SCNC: 136 MMOL/L (ref 136–145)
WBC # BLD AUTO: 4.5 X10*3/UL (ref 4.4–11.3)

## 2025-04-08 PROCEDURE — 80069 RENAL FUNCTION PANEL: CPT

## 2025-04-08 PROCEDURE — 2500000004 HC RX 250 GENERAL PHARMACY W/ HCPCS (ALT 636 FOR OP/ED)

## 2025-04-08 PROCEDURE — G0378 HOSPITAL OBSERVATION PER HR: HCPCS

## 2025-04-08 PROCEDURE — 36415 COLL VENOUS BLD VENIPUNCTURE: CPT

## 2025-04-08 PROCEDURE — 97112 NEUROMUSCULAR REEDUCATION: CPT | Mod: GP,CQ

## 2025-04-08 PROCEDURE — 2500000001 HC RX 250 WO HCPCS SELF ADMINISTERED DRUGS (ALT 637 FOR MEDICARE OP): Performed by: INTERNAL MEDICINE

## 2025-04-08 PROCEDURE — 83735 ASSAY OF MAGNESIUM: CPT

## 2025-04-08 PROCEDURE — 96372 THER/PROPH/DIAG INJ SC/IM: CPT

## 2025-04-08 PROCEDURE — 97116 GAIT TRAINING THERAPY: CPT | Mod: GP,CQ

## 2025-04-08 PROCEDURE — 2500000001 HC RX 250 WO HCPCS SELF ADMINISTERED DRUGS (ALT 637 FOR MEDICARE OP)

## 2025-04-08 PROCEDURE — 97530 THERAPEUTIC ACTIVITIES: CPT | Mod: GO,CO

## 2025-04-08 PROCEDURE — 97535 SELF CARE MNGMENT TRAINING: CPT | Mod: GO,CO

## 2025-04-08 PROCEDURE — 85027 COMPLETE CBC AUTOMATED: CPT

## 2025-04-08 PROCEDURE — 99239 HOSP IP/OBS DSCHRG MGMT >30: CPT

## 2025-04-08 RX ADMIN — BUPROPION HYDROCHLORIDE 200 MG: 100 TABLET, FILM COATED, EXTENDED RELEASE ORAL at 09:49

## 2025-04-08 RX ADMIN — PREDNISOLONE ACETATE 1 DROP: 10 SUSPENSION/ DROPS OPHTHALMIC at 13:07

## 2025-04-08 RX ADMIN — VENLAFAXINE 37.5 MG: 37.5 TABLET ORAL at 09:49

## 2025-04-08 RX ADMIN — PREDNISOLONE ACETATE 1 DROP: 10 SUSPENSION/ DROPS OPHTHALMIC at 06:06

## 2025-04-08 RX ADMIN — Medication 1 CAPSULE: at 09:49

## 2025-04-08 RX ADMIN — HEPARIN SODIUM 5000 UNITS: 5000 INJECTION, SOLUTION INTRAVENOUS; SUBCUTANEOUS at 05:51

## 2025-04-08 RX ADMIN — HEPARIN SODIUM 5000 UNITS: 5000 INJECTION, SOLUTION INTRAVENOUS; SUBCUTANEOUS at 13:07

## 2025-04-08 ASSESSMENT — COGNITIVE AND FUNCTIONAL STATUS - GENERAL
MOVING TO AND FROM BED TO CHAIR: A LITTLE
TOILETING: A LITTLE
MOBILITY SCORE: 20
TOILETING: A LITTLE
CLIMB 3 TO 5 STEPS WITH RAILING: A LITTLE
PERSONAL GROOMING: A LITTLE
CLIMB 3 TO 5 STEPS WITH RAILING: A LITTLE
HELP NEEDED FOR BATHING: A LITTLE
STANDING UP FROM CHAIR USING ARMS: A LITTLE
DRESSING REGULAR LOWER BODY CLOTHING: A LITTLE
MOVING TO AND FROM BED TO CHAIR: A LITTLE
WALKING IN HOSPITAL ROOM: A LITTLE
DRESSING REGULAR LOWER BODY CLOTHING: A LITTLE
DAILY ACTIVITIY SCORE: 21
DAILY ACTIVITIY SCORE: 20
WALKING IN HOSPITAL ROOM: A LITTLE
MOBILITY SCORE: 20
HELP NEEDED FOR BATHING: A LITTLE
STANDING UP FROM CHAIR USING ARMS: A LITTLE

## 2025-04-08 ASSESSMENT — BALANCE ASSESSMENTS
STANDING UNSUPPORTED: ABLE TO STAND 2 MINUTES WITH SUPERVISION
STANDING UNSUPPORTED WITH FEET TOGETHER: ABLE TO PLACE FEET TOGETHER INDEPENDENTLY AND STAND 1 MINUTE WITH SUPERVISION
STANDING UNSUPPORTED WITH EYES CLOSED: ABLE TO STAND 10 SECONDS SAFELY
STANDING UNSUPPORTED ONE FOOT IN FRONT: NEEDS HELP TO STEP BUT CAN HOLD 15 SECONDS
TURN 360 DEGREES: ABLE TO TURN 360 DEGREES SAFELY BUT SLOWLY
STANDING TO SITTING: ABLE TO STAND INDEPENDENTLY USING HANDS

## 2025-04-08 ASSESSMENT — PAIN SCALES - GENERAL
PAINLEVEL_OUTOF10: 0 - NO PAIN
PAINLEVEL_OUTOF10: 0 - NO PAIN

## 2025-04-08 ASSESSMENT — ACTIVITIES OF DAILY LIVING (ADL): HOME_MANAGEMENT_TIME_ENTRY: 45

## 2025-04-08 ASSESSMENT — PAIN - FUNCTIONAL ASSESSMENT: PAIN_FUNCTIONAL_ASSESSMENT: 0-10

## 2025-04-08 NOTE — PROGRESS NOTES
Occupational Therapy    OT Treatment    Patient Name: John W Nyhan  MRN: [id number]  Today's Date: 4/8/2025  Time Calculation  Start Time: 0931  Stop Time: 1031  Time Calculation (min): 60 min       3135/3135-A    Assessment:  OT Assessment: Pt presents with decreased balance, strength and activity tolerance, impacting pt ability to complete ADL's and mobility independently  Prognosis: Good  End of Session Communication: Bedside nurse  End of Session Patient Position: Up in chair, Alarm on  OT Assessment Results: Decreased ADL status, Decreased functional mobility  Prognosis: Good    Plan:  Treatment Interventions: ADL retraining, Functional transfer training  OT Frequency: 4 times per week  OT Discharge Recommendations: Low intensity level of continued care  Treatment Interventions: ADL retraining, Functional transfer training  Subjective     Outcome Measures:[facility] Daily Activity  Putting on and taking off regular lower body clothing: A little  Bathing (including washing, rinsing, drying): A little  Putting on and taking off regular upper body clothing: None  Toileting, which includes using toilet, bedpan or urinal: A little  Taking care of personal grooming such as brushing teeth: None  Eating Meals: None  Daily Activity - Total Score: 21         04/08/25 0931   OT Last Visit   OT Received On 04/08/25   General   Reason for Referral impaired ADL's   Prior to Session Communication Bedside nurse   Patient Position Received Up in chair;Alarm on   Preferred Learning Style verbal;visual   General Comment Pt agreeable to therapy. Repeatedly stated that he cannot take care of himself at home, especially on days he has dialysis. Pt also states that he cannot use his oven anymore since he recently burned his L hand while taking a pan out of the oven. Pt extremely worried because he is unable to put his recently prescribed drops in his eyes.   Precautions   Medical Precautions Fall precautions   Precautions Comment Bed/Chair  Alarm   Pain Assessment   0-10 (Numeric) Pain Score 0 - No pain   Cognition   Orientation Level Oriented X4   Feeding   Feeding Comments pt seted in chair spreading butter on his pancake . having increased difficulty opening packets of seasoning   Grooming   Grooming Level of Assistance Setup   Grooming Where Assessed Standing sinkside   Grooming Comments brushed teeth   LE Dressing   LE Dressing Yes   Pants Level of Assistance Close supervision  (Pt seated, sfing weight side to side on hips in order to slide pants off, and then forward flexionto lawrence and pull off pants. To don forward flexion to thread legs through pant holesthen stood to hike up pants. Performed same for don/doff underwear.)   Shoe Level of Assistance Close supervision  (forward flexion to don/doff shoes with velcro.)   LE Dressing Where Assessed Chair   Functional Mobility   Functional Mobility Performed   (Pt ambulates in room with quad cane at SBA. Pt performing grooming activity standing sinkside 5 minutes)   Transfers   Transfer Yes   Transfer 1   Transfer From 1 Sit to   Transfer to 1 Stand   Transfer Device 1 Gait belt;Quad cane   Transfer Level of Assistance 1 Close supervision   IP OT Assessment   OT Assessment Pt presents with decreased balance, strength and activity tolerance, impacting pt ability to complete ADL's and mobility independently   Prognosis Good   End of Session Communication Bedside nurse   End of Session Patient Position Up in chair;Alarm on   OT Assessment   OT Assessment Results Decreased ADL status;Decreased functional mobility   Education   Individual(s) Educated Patient   Education Provided Fall precautons   Patient Response to Education Patient/Caregiver Verbalized Understanding of Information   Education Comment Pt would benefit from continued reinforcement   Inpatient Plan   Treatment Interventions ADL retraining;Functional transfer training   OT Frequency 4 times per week   OT Discharge Recommendations Low  intensity level of continued care         Goals:  Encounter Problems       Encounter Problems (Active)       Dressings Lower Extremities       STG - Patient to complete lower body dressing MILLI  (Progressing)       Start:  04/04/25    Expected End:  04/18/25               Functional Balance       LTG - Patient will maintain standing and sitting balance to allow for completion of daily activities (Progressing)       Start:  04/04/25    Expected End:  04/18/25               Grooming       STG - Patient completes grooming MOD I (Progressing)       Start:  04/04/25    Expected End:  04/18/25               OT Transfers       STG - Patient will perform toilet transfer MOD I (Progressing)       Start:  04/04/25    Expected End:  04/18/25

## 2025-04-08 NOTE — CARE PLAN
The patient's goals for the shift include pt.  want  to  feel  better    The clinical goals for the shift include remain free of falls and injury    Problem: Pain - Adult  Goal: Verbalizes/displays adequate comfort level or baseline comfort level  Outcome: Progressing     Problem: Safety - Adult  Goal: Free from fall injury  Outcome: Progressing     Problem: Discharge Planning  Goal: Discharge to home or other facility with appropriate resources  Outcome: Progressing     Problem: Chronic Conditions and Co-morbidities  Goal: Patient's chronic conditions and co-morbidity symptoms are monitored and maintained or improved  Outcome: Progressing     Problem: Nutrition  Goal: Nutrient intake appropriate for maintaining nutritional needs  Outcome: Progressing

## 2025-04-08 NOTE — PROGRESS NOTES
John W Nyhan is a 74 y.o. male on day 0 of admission presenting with Failure to thrive in adult.      Subjective   Events reviewed,  No sob     Medication Documentation Review Audit       Reviewed by Samuel Rodriguez (Technician) on 25 at 1440      Medication Order Taking? Sig Documenting Provider Last Dose Status   buPROPion SR (Wellbutrin SR) 200 mg 12 hr tablet [id number] Yes Take 1 tablet (200 mg) by mouth once daily. Do not crush, chew, or split. Yulissa Velasquez MD 4/3/2025 Morning Active   divalproex (Depakote ER) 250 mg 24 hr tablet [id number] Yes Take 1 tablet (250 mg) by mouth once daily at bedtime. Do not crush, chew, or split. Yulissa Velasquez MD 2025 Bedtime Active   midodrine (Proamatine) 10 mg tablet [id number] Yes Take 1 tablet (10 mg) by mouth once daily as needed (for low BP). Historical Provider, MD Past Week Active   multivitamin (Daily-Kobi, with folic acid,) tablet [id number] Yes Take 1 tablet by mouth once daily. Yulissa Provider, MD 4/3/2025 Morning Active   prednisoLONE acetate (Pred-Forte) 1 % ophthalmic suspension [id number] Yes Administer 1 drop into both eyes 4 times a day. Yulissa Provider, MD 2025 Morning Active   traZODone (Desyrel) 50 mg tablet [id number] No Take 1 tablet (50 mg) by mouth once daily at bedtime.   Patient not taking: Reported on 4/3/2025    Rd Bianchi, DO Not Taking  23 206   venlafaxine (Effexor) 37.5 mg tablet [id number]  Take 1 tablet (37.5 mg) by mouth once daily. Historical Provider, MD  Active   venlafaxine XR (Effexor-XR) 150 mg 24 hr capsule [id number] No Take 1 capsule (150 mg) by mouth once daily with a meal. Do not crush or chew. Do not start before 2023.   Patient not taking: Reported on 4/3/2025    Rd Bianchi, DO Not Taking  11/15/23 0539                    Objective          Vitals 24HR  Heart Rate:  [75-84]   Temp:  [36.6 °C (97.9 °F)-37.1 °C (98.8 °F)]   Resp:  [18-20]   BP: (480-136)/(60-06)    SpO2:  [95 %-99 %]         Intake/Output last 3 Shifts:    Intake/Output Summary (Last 24 hours) at 4/8/2025 1424  Last data filed at 4/7/2025 1831  Gross per 24 hour   Intake 480 ml   Output 300 ml   Net 180 ml       Physical Exam    No edema    Relevant Results      Results for orders placed or performed during the hospital encounter of 04/03/25 (from the past 24 hours)   Renal Function Panel   Result Value Ref Range    Glucose 77 74 - 99 mg/dL    Sodium 136 136 - 145 mmol/L    Potassium 4.7 3.5 - 5.3 mmol/L    Chloride 96 (L) 98 - 107 mmol/L    Bicarbonate 29 21 - 32 mmol/L    Anion Gap 16 10 - 20 mmol/L    Urea Nitrogen 37 (H) 6 - 23 mg/dL    Creatinine 3.77 (H) 0.50 - 1.30 mg/dL    eGFR 16 (L) >60 mL/min/1.73m*2    Calcium 9.6 8.6 - 10.3 mg/dL    Phosphorus 4.3 2.5 - 4.9 mg/dL    Albumin 4.1 3.4 - 5.0 g/dL   Magnesium   Result Value Ref Range    Magnesium 2.40 1.60 - 2.40 mg/dL   CBC   Result Value Ref Range    WBC 4.5 4.4 - 11.3 x10*3/uL    nRBC 0.0 0.0 - 0.0 /100 WBCs    RBC 3.16 (L) 4.50 - 5.90 x10*6/uL    Hemoglobin 10.8 (L) 13.5 - 17.5 g/dL    Hematocrit 33.0 (L) 41.0 - 52.0 %     (H) 80 - 100 fL    MCH 34.2 (H) 26.0 - 34.0 pg    MCHC 32.7 32.0 - 36.0 g/dL    RDW 13.5 11.5 - 14.5 %    Platelets 78 (L) 150 - 450 x10*3/uL              Assessment/Plan        Assessment  ESRD on HD MWF  Anemia of ckd  H/o HTN  Hyperphosphatemia   2HPT  Mild hyponatremia     Plan   Next HD 4/9  Adequate phos control with goal 3.5-5.5  Renal MVI  Hg is adequate      Tank Weems MD

## 2025-04-08 NOTE — NURSING NOTE
Pt discharged to [facility] with [facility] as transport. Attempted to call report to [facility] with no answer. Phone number left to return call for report. IV removed. No acute distress noted.

## 2025-04-08 NOTE — PROGRESS NOTES
04/08/25 1453   Discharge Planning   Home or Post Acute Services Post acute facilities (Rehab/SNF/etc)   Type of Post Acute Facility Services Long term care   Expected Discharge Disposition Inter   Does the patient need discharge transport arranged? Yes   RoundTrip coordination needed? Yes   Has discharge transport been arranged? Yes   What day is the transport expected? 04/08/25   What time is the transport expected? 1630     Patient notified transportation is scheduled for today at 1630. He became anxious and tearful. He is concerned about his dialysis schedule for tomorrow. Charge nurse is following up with Dr. Rizo.

## 2025-04-08 NOTE — PROGRESS NOTES
Pt has been accepted to [facility] for new LTC, and they can accept today.  Request to DSC to send dc paperwork, pasrr and arrange transport.  Confirmed for 4:00pm; facility, bedside RN, TCC and pt all aware.

## 2025-04-08 NOTE — DISCHARGE SUMMARY
Discharge Diagnosis  Failure to thrive in adult    Issues Requiring Follow-Up  - Anxiety / Depression. Patient was primarily anxious during stay. Treated with PRN atarax, which made patient too drowsy. Would benefit from PCP for longitudinal medical management     Discharge Meds     Medication List      CHANGE how you take these medications     venlafaxine 37.5 mg tablet; Commonly known as: Effexor; What changed:   Another medication with the same name was removed. Continue taking this   medication, and follow the directions you see here.     CONTINUE taking these medications     buPROPion  mg 12 hr tablet; Commonly known as: Wellbutrin SR   Daily-Kobi (with folic acid) 400 mcg tablet; Generic drug: multivitamin   divalproex 250 mg 24 hr tablet; Commonly known as: Depakote ER   midodrine 10 mg tablet; Commonly known as: Proamatine   prednisoLONE acetate 1 % ophthalmic suspension; Commonly known as:   Pred-Forte     STOP taking these medications     traZODone 50 mg tablet; Commonly known as: Desyrel       Test Results Pending At Discharge  Pending Labs       No current pending labs.            Hospital Course  Mr. John W Nyhan is a 74 y.o. male with PMH significant for ESRD on HD MWF secondary to remote hx of Lithium use, HTN, HLD, CAD, bipolar disorder, gout, and aortic stenosis who presented to [facility] ED from home on 4/30/2025 with difficulty taking care of himself at home. Patient reported inability to take medications on his own, difficulty with ambulation. Was admitted for failure to thrive in adult. Nephrology followed patient for dialysis needs. Lab work monitored with no clinically significant acute abnormalities. Patient was discharged to intermediate care [facility] 4/8/25. Patient hemodynamically stable at time of discharge.           Pertinent Physical Exam At Time of Discharge  Physical Exam  Constitutional:       Appearance: Normal appearance.   HENT:      Head: Normocephalic and  atraumatic.      Nose: No rhinorrhea.   Cardiovascular:      Rate and Rhythm: Normal rate and regular rhythm.   Pulmonary:      Effort: Pulmonary effort is normal. No respiratory distress.      Breath sounds: Normal breath sounds.   Musculoskeletal:      Cervical back: Neck supple.      Right lower leg: No edema.      Left lower leg: No edema.   Skin:     General: Skin is warm and dry.   Neurological:      General: No focal deficit present.      Mental Status: He is alert and oriented to person, place, and time.   Psychiatric:         Mood and Affect: Mood normal.         Behavior: Behavior normal.         Outpatient Follow-Up  No future appointments.      Leo Guerra MD

## 2025-04-08 NOTE — CARE PLAN
The patient's goals for the shift include discharge     The clinical goals for the shift include pt will remain free from fall through the end of the shift

## 2025-04-08 NOTE — DISCHARGE INSTRUCTIONS
- This patient is to be discharged to [facility] due to difficulty with ADLs / caring for self. Adult failure to thrive. No follow up lab work indicated from this stay. However, patient was experiencing anxiety during stay. Long term management via PCP may be of benefit.

## 2025-04-08 NOTE — PROGRESS NOTES
04/08/25 1212   Discharge Planning   Home or Post Acute Services Post acute facilities (Rehab/SNF/etc)   Type of Post Acute Facility Services Long term care   Expected Discharge Disposition Inter  ([facility].)     The pt  now agrees to long term care at [facility]. Sent therapy updates to [facility]. They will start the auth for long term care as soon as the HENS has been completed. Send a message to the Dr to complete the goldenrod.  Sent a message to the DSC to complete the HENS once the goldenrod has been completed.

## 2025-04-08 NOTE — PROGRESS NOTES
Physical Therapy    Physical Therapy Treatment    Patient Name: John W Nyhan  MRN: [id number]  Today's Date: 4/8/2025  Time Calculation  Start Time: 0845  Stop Time: 0925  Time Calculation (min): 40 min     3135/3135-A     04/08/25 0845   PT  Visit   PT Received On 04/08/25   General   Reason for Referral impaired mobility   Referred By Mayito Redding   Prior to Session Communication Bedside nurse   Patient Position Received Alarm on;Bed, 2 rail up   Preferred Learning Style verbal;visual   General Comment Pt.  was in bed when I arrived. Pt. had no c/o pain. Agreeablet  to PT. PT. appeared anxious  and overwhelmed at times during session.   Precautions   Medical Precautions Fall precautions   Precautions Comment Bed/Chair Alarm   Cognition   Orientation Level Oriented X4   Postural Control   Posture Comment Kyphotic posture   Static Sitting Balance   Static Sitting-Comment/Number of Minutes Steady unsupported sitting balance.  Duration was not an issue.   Dynamic Sitting Balance   Dynamic Sitting-Comments Steady with shifting weight in sitting and scooting forward or backward in the chiar. Needed bilateral UE to move.   Static Standing Balance   Static Standing-Comment/Number of Minutes Static standing at the sink with stand by assist /supervision.   Dynamic Standing Balance   Dynamic Standing-Comments Pt needed CGB to Min assitst.   See Pastor balance activites   Bed Mobility   Bed Mobility Yes   Bed Mobility 1   Bed Mobility 1 Supine to sitting   Level of Assistance 1 Modified independent   Bed Mobility Comments 1 HOB slightly eleveated, Use the bed rail to move.   Ambulation/Gait Training   Ambulation/Gait Training Performed Yes   Ambulation/Gait Training 1   Surface 1 Level tile   Device 1 SBQC   Gait Support Devices Gait belt   Assistance 1 Contact guard   Quality of Gait 1 NBOS;Diminished heel strike;Decreased step length;Shuffling gait;Listing   Comments/Distance (ft) 1 Pt,. ambulated with the SBQC 250'x1, Pt,  held the cane in his left hand and occaisioally the hand rail with his right. He did not use the cane for support. and was often out of sequence with stepping   Transfer 1   Technique 1 Sit to stand;Stand to sit   Transfer Device 1 Gait belt;Quad cane   Transfer Level of Assistance 1 Close supervision   Trials/Comments 1 Pt not able to stand with out use of arms.   Activity Tolerance   Endurance Endurance does not limit participation in activity   PT Assessment   PT Assessment Results Decreased strength;Decreased mobility;Impaired balance   Rehab Prognosis Good   End of Session Communication Bedside nurse   End of Session Patient Position Up in chair;Alarm on   PT Plan   PT Plan Ongoing PT   PT Frequency 3 times per week   PT Discharge Recommendations Low intensity level of continued care   Equipment Recommended upon Discharge   (LRAD PRN)     Outcome Measures:     04/08/25 0845   [facility] Basic Mobility   Turning from your back to your side while in a flat bed without using bedrails                         4   Moving from lying on your back to sitting on the side of a flat bed without using bedrails                         4   Moving to and from bed to chair (including a wheelchair)                         3   Standing up from a chair using your arms (e.g. wheelchair or bedside chair)                          3   To walk in hospital room                          3   Climbing 3-5 steps with railing                          3   Basic Mobility - Total Score                          20   Pastor Balance Scale    Pastor not scored but activities use to challenge balance.    1. Sitting to Standing                             3   2. Standing Unsupported                             3   6. Standing Unsupported with Eyes Closed                             4   7. Standing Unsupported with Feet Together                             3   11. Turn 360 Degrees                             2  Took 13 seconds to turn both directions   13.  Standing Unsupported One Foot in Front                             1  (Not able attain heel to toe positon, feet about 5 inches apart. Not able to  stride, where the front  passed  the other.foot..- Heel of forward foot lined up with ball off the other foot. Standing with left foot was forward, pt had LOB to left.)   Timed Up and Go Test   How many seconds did it take to complete the 5 tasks? 16.66 seconds   Observe the patient’s postural stability, gait, stride length, and sway. Select All that Apply: Shuffling;Short strides;Not using assistive device properly   TUG Comment Pt. performed test twice, times were 16.75 sec and 16.58. Times were averaged to get the score  of 16.66 seconds.   Other Measures   5x Sit to Stand 19.73 seconds     Pt. performed the test x2 twice with times of 20.29 and 19.17 seconds.  Times were averaged to get the score 19.73 above. Pt. needed to use B UE stand.)     Assessment/Plan   PT Assessment  PT Assessment Results: Decreased strength, Decreased mobility, Impaired balance  Rehab Prognosis: Good  End of Session Communication: Bedside nurse  End of Session Patient Position: Up in chair, Alarm on     PT Plan  Treatment/Interventions: Bed mobility, Transfer training, Gait training, Stair training, Balance training, Neuromuscular re-education, Strengthening, Range of motion, Therapeutic exercise, Therapeutic activity, Home exercise program  PT Plan: Ongoing PT  PT Frequency: 3 times per week  PT Discharge Recommendations: Low intensity level of continued care  Equipment Recommended upon Discharge:  (LRAD PRN)  PT Recommended Transfer Status: Stand by assist  PT - OK to Discharge: Yes    EDUCATION:     Education Documentation  Body Mechanics, taught by Shane Magaña PTA at 4/8/2025  9:35 AM.  Learner: Patient  Readiness: Acceptance  Method: Explanation  Response: Needs Reinforcement, Verbalizes Understanding    Mobility Training, taught by Shane Magaña PTA at 4/8/2025  9:35  [place]  Learner: Patient  Readiness: Acceptance  Method: Explanation  Response: Needs Reinforcement, Verbalizes Understanding    Education Comments  No comments found.        GOALS:  Encounter Problems       Encounter Problems (Active)       PT Problem       Pt will be able to perform all bed mobility tasks with Mod I.  (Progressing)       Start:  04/04/25    Expected End:  04/18/25            Pt will perform all transfers with Mod I and LRAD PRN with proper safety mechanics.   (Progressing)       Start:  04/04/25    Expected End:  04/18/25            Pt will ambulate 250 ft with Mod I using LRAD PRN for improved functional independence.  (Progressing)       Start:  04/04/25    Expected End:  04/18/25            Pt will be able to negotiate 2 steps with 1 HR with Mod I.  (Progressing)       Start:  04/04/25    Expected End:  04/18/25               Pain - Adult

## 2025-04-08 NOTE — HOSPITAL COURSE
Mr. John W Nyhan is a 74 y.o. male with PMH significant for ESRD on HD MWF secondary to remote hx of Lithium use, HTN, HLD, CAD, bipolar disorder, gout, and aortic stenosis who presented to [facility] ED from home on 4/30/2025 with difficulty taking care of himself at home. Patient reported inability to take medications on his own, difficulty with ambulation. Was admitted for failure to thrive in adult. Nephrology followed patient for dialysis needs. Lab work monitored with no clinically significant acute abnormalities. Patient was discharged to intermediate care center [facility] 4/8/25. Patient hemodynamically stable at time of discharge.

## 2025-04-29 LAB
ATRIAL RATE: 70 BPM
P AXIS: 2 DEGREES
P OFFSET: 162 MS
P ONSET: 131 MS
PR INTERVAL: 154 MS
Q ONSET: 208 MS
QRS COUNT: 12 BEATS
QRS DURATION: 106 MS
QT INTERVAL: 442 MS
QTC CALCULATION(BAZETT): 477 MS
QTC FREDERICIA: 465 MS
R AXIS: -49 DEGREES
T AXIS: 44 DEGREES
T OFFSET: 429 MS
VENTRICULAR RATE: 70 BPM